# Patient Record
Sex: FEMALE | Race: WHITE | NOT HISPANIC OR LATINO | Employment: UNEMPLOYED | URBAN - METROPOLITAN AREA
[De-identification: names, ages, dates, MRNs, and addresses within clinical notes are randomized per-mention and may not be internally consistent; named-entity substitution may affect disease eponyms.]

---

## 2017-01-03 ENCOUNTER — HOSPITAL ENCOUNTER (OUTPATIENT)
Dept: MAMMOGRAPHY | Facility: HOSPITAL | Age: 52
Discharge: HOME | End: 2017-01-03
Attending: FAMILY MEDICINE | Admitting: FAMILY MEDICINE

## 2017-01-03 DIAGNOSIS — Z00.00 ANNUAL PHYSICAL EXAM: ICD-10-CM

## 2017-01-03 PROCEDURE — G0202 SCR MAMMO BI INCL CAD: HCPCS

## 2017-01-03 PROCEDURE — 77063 BREAST TOMOSYNTHESIS BI: CPT

## 2017-01-31 RX ORDER — GLYCOPYRROLATE 2 MG/1
1 TABLET ORAL 2 TIMES DAILY
Qty: 90 TABLET | Refills: 1 | Status: SHIPPED | OUTPATIENT
Start: 2017-01-31 | End: 2017-07-10 | Stop reason: SDUPTHER

## 2017-02-21 ENCOUNTER — TELEPHONE (OUTPATIENT)
Dept: INTERNAL MEDICINE | Facility: CLINIC | Age: 52
End: 2017-02-21

## 2017-02-21 NOTE — TELEPHONE ENCOUNTER
----- Message from Silvia Frias sent at 2017  2:15 PM EST -----  Regarding: PRIOR AUTHORIZATION  Contact: [phone]  :  63    ROHIT PATIENT    PATIENT SAID HER INSURANCE WAS REQUIRING PRIOR AUTHORIZATION FOR VESICARE, 5 MG, TAKEN ONCE DAILY.    PHARMACY IS [facility] [place]    HAS NONE LEFT      This has already been done

## 2017-02-23 RX ORDER — MELOXICAM 15 MG/1
TABLET ORAL
Qty: 30 TABLET | Refills: 6 | Status: SHIPPED | OUTPATIENT
Start: 2017-02-23 | End: 2017-06-22

## 2017-02-24 ENCOUNTER — TELEPHONE (OUTPATIENT)
Dept: INTERNAL MEDICINE | Facility: CLINIC | Age: 52
End: 2017-02-24

## 2017-02-24 NOTE — TELEPHONE ENCOUNTER
----- Message from Corrie Stacy sent at 2/24/2017  8:40 AM EST -----  Regarding: SAMPLES PLEASE  Gokul    solifenacin (VESICARE) 5 MG tablet       SAMPLES PLEASE - [phone]      i have tried to call pt 3 x she is not accepting call,  Pa was obatined for vesicare,   Called to [facility],  Cost of pt still   [phone]

## 2017-03-01 ENCOUNTER — TELEPHONE (OUTPATIENT)
Dept: INTERNAL MEDICINE | Facility: CLINIC | Age: 52
End: 2017-03-01

## 2017-03-01 RX ORDER — OXYBUTYNIN CHLORIDE 5 MG/1
5 TABLET, EXTENDED RELEASE ORAL DAILY
Qty: 30 TABLET | Refills: 5 | Status: SHIPPED | OUTPATIENT
Start: 2017-03-01 | End: 2017-08-24 | Stop reason: SDUPTHER

## 2017-03-01 NOTE — TELEPHONE ENCOUNTER
Patient advised.    ----- Message from Janae Diego MA sent at 3/1/2017 10:10 AM EST -----  Regarding: FW: [facility]  Contact: [phone]      ----- Message -----     From: Spenser Hodgson MD     Sent: 3/1/2017   8:10 AM       [facility] [place]  Subject: FW: [facility]                           I escribed oxybutinin for her.  Thanks.  ----- Message -----     From: Janae Diego MA     Sent: 2017   9:03 AM       To: Spenser Hodgson MD  Subject: FW: [facility]                               ----- Message -----     From: Silvia Frias     Sent: 2017   8:40 AM       To: [facility]  Subject: [facility]                               :  63    ROHIT PATIENT    PATIENT SAID WITH HER NEW INSURANCE THAT THE VESICARE, 5 MG, TAKEN ONCE A DAY, IS OVER $200 A MONTH. IS THERE A SUBSTITUTE SHE COULD USE?

## 2017-03-06 RX ORDER — TRAZODONE HYDROCHLORIDE 100 MG/1
TABLET ORAL
Qty: 30 TABLET | Refills: 6 | Status: SHIPPED | OUTPATIENT
Start: 2017-03-06 | End: 2017-06-28

## 2017-04-07 ENCOUNTER — TELEPHONE (OUTPATIENT)
Dept: INTERNAL MEDICINE | Facility: CLINIC | Age: 52
End: 2017-04-07

## 2017-04-07 RX ORDER — ACYCLOVIR 400 MG/1
400 TABLET ORAL DAILY
Qty: 30 TABLET | Refills: 6 | Status: SHIPPED | OUTPATIENT
Start: 2017-04-07 | End: 2018-04-23 | Stop reason: SDUPTHER

## 2017-04-07 NOTE — TELEPHONE ENCOUNTER
----- Message from Corrie Stacy sent at 4/7/2017  2:32 PM EDT -----  Regarding: DR BEE PT  Gokul    acyclovir (ZOVIRAX) 400 MG tablet - take one tablet daily  #30  None left  [facility]    [phone]    Another [facility] supposedly faxed      SENT TO PHARMACY

## 2017-04-25 RX ORDER — LEVOTHYROXINE SODIUM 112 UG/1
TABLET ORAL
Qty: 30 TABLET | Refills: 5 | Status: SHIPPED | OUTPATIENT
Start: 2017-04-25 | End: 2017-06-28

## 2017-05-01 ENCOUNTER — TELEPHONE (OUTPATIENT)
Dept: INTERNAL MEDICINE | Facility: CLINIC | Age: 52
End: 2017-05-01

## 2017-06-05 DIAGNOSIS — K21.9 GASTROESOPHAGEAL REFLUX DISEASE WITHOUT ESOPHAGITIS: ICD-10-CM

## 2017-06-05 RX ORDER — PANTOPRAZOLE SODIUM 40 MG/1
TABLET, DELAYED RELEASE ORAL
Qty: 30 TABLET | Refills: 5 | Status: SHIPPED | OUTPATIENT
Start: 2017-06-05 | End: 2017-06-28

## 2017-06-06 ENCOUNTER — OFFICE VISIT (OUTPATIENT)
Dept: GASTROENTEROLOGY | Facility: CLINIC | Age: 52
End: 2017-06-06

## 2017-06-06 VITALS
HEIGHT: 60 IN | DIASTOLIC BLOOD PRESSURE: 76 MMHG | SYSTOLIC BLOOD PRESSURE: 110 MMHG | WEIGHT: 169.2 LBS | BODY MASS INDEX: 33.22 KG/M2 | OXYGEN SATURATION: 97 % | HEART RATE: 60 BPM

## 2017-06-06 DIAGNOSIS — R13.10 DYSPHAGIA, UNSPECIFIED TYPE: ICD-10-CM

## 2017-06-06 DIAGNOSIS — R10.10 PAIN OF UPPER ABDOMEN: ICD-10-CM

## 2017-06-06 DIAGNOSIS — K21.9 GASTROESOPHAGEAL REFLUX DISEASE WITHOUT ESOPHAGITIS: ICD-10-CM

## 2017-06-06 DIAGNOSIS — R11.0 NAUSEA: Primary | ICD-10-CM

## 2017-06-06 PROCEDURE — 99203 OFFICE O/P NEW LOW 30 MIN: CPT | Performed by: INTERNAL MEDICINE

## 2017-06-06 RX ORDER — SUCRALFATE 1 G/1
1 TABLET ORAL 3 TIMES DAILY
Qty: 90 TABLET | Refills: 3 | Status: SHIPPED | OUTPATIENT
Start: 2017-06-06 | End: 2017-06-22

## 2017-06-06 NOTE — PROGRESS NOTES
PATIENT INFORMATION  Aundrea Mcgarry       - 1965    CHIEF COMPLAINT  Chief Complaint   Patient presents with   • Abdominal Pain   • Diarrhea   • Nausea   • Heartburn   • Difficulty Swallowing       HISTORY OF PRESENT ILLNESS  Abdominal Pain   Associated symptoms include diarrhea, frequency and nausea. Her past medical history is significant for GERD.   Diarrhea    Associated symptoms include abdominal pain, chills and coughing.   Nausea   Associated symptoms include abdominal pain, chills, coughing and nausea.   Heartburn   She complains of abdominal pain, coughing and nausea.     She presents with 6 months of postprandial epigastric abdominal pain, sharp, radiates to the back wit some associated nausea but not vomiting. She at times will wake up with pain. Beer does make it worse. Pain lasts up to 2 hours and she takes zantac and ibuprofen and pain slowly subsides. Weight is stable.   CT done last year with mildly dilated GB.She has underlying history for esophageal spasms and takes trazodone as needed but states this is not similar to those episodes.  She is also on Protonix daily for reflux.  She takes mobic daily for the past 1 year.  He denies any melena or hematochezia.  She did have a previous upper endoscopy several years ago.  The results of this could not be reviewed as the report is not available at this time.  Her overall weight has been stable.  She will at times note that while swallowing either liquids or solids she notes a slowness of passage into the stomach.  She denies any episodes of food getting stuck.    She is on mobic daily for the past one year. No melena or hematochezia.  REVIEW OF SYSTEMS  Review of Systems   Constitutional: Positive for chills.   HENT: Positive for trouble swallowing.    Respiratory: Positive for cough.    Gastrointestinal: Positive for abdominal pain, diarrhea and nausea.        REFLUX   Genitourinary: Positive for frequency.   Musculoskeletal: Positive for  back pain.   Allergic/Immunologic: Positive for environmental allergies.   All other systems reviewed and are negative.        ACTIVE PROBLEMS  Patient Active Problem List    Diagnosis   • Annual physical exam [Z00.00]   • Bursitis/tendonitis, shoulder [M71.9, M67.919]   • Right facial numbness [R20.0]   • Bilateral shoulder pain [M25.511, M25.512]   • Shoulder, capsulitis, adhesive [M75.00]   • Abdominal pain [R10.9]   • Arthritis [M19.90]   • Bunion [M21.619]   • Gastroesophageal reflux disease [K21.9]   • Irregular menstrual cycle [N92.6]   • Palpitations [R00.2]   • Dyskinesia of esophagus [K22.4]   • Ventricular premature beats [I49.3]   • Recurrent herpes labialis [B00.1]   • Disorder of thyroid [E07.9]   • Dizziness [R42]         PAST MEDICAL HISTORY  Past Medical History:   Diagnosis Date   • Broken jaw    • Ear infection    • GERD (gastroesophageal reflux disease)    • Hypothyroidism          SURGICAL HISTORY  Past Surgical History:   Procedure Laterality Date   • BREAST BIOPSY     • COLONOSCOPY     • HIP SURGERY     • HYSTERECTOMY     • MANDIBLE FRACTURE SURGERY     • TONSILLECTOMY           FAMILY HISTORY  Family History   Problem Relation Age of Onset   • Thyroid disease Mother    • Hypertension Mother    • Diabetes Father    • Hypertension Father    • Heart disease Father    • Breast cancer Maternal Aunt    • Breast cancer Paternal Aunt          SOCIAL HISTORY  Social History     Occupational History   • Not on file.     Social History Main Topics   • Smoking status: Former Smoker   • Smokeless tobacco: Not on file   • Alcohol use 3.0 oz/week     5 Glasses of wine per week   • Drug use: Defer   • Sexual activity: Defer         CURRENT MEDICATIONS    Current Outpatient Prescriptions:   •  acyclovir (ZOVIRAX) 400 MG tablet, Take 1 tablet by mouth Daily., Disp: 30 tablet, Rfl: 6  •  glycopyrrolate (ROBINUL) 2 MG tablet, Take 0.5 tablets by mouth 2 (Two) Times a Day., Disp: 90 tablet, Rfl: 1  •  ibuprofen  "(ADVIL,MOTRIN) 200 MG tablet, Take  by mouth., Disp: , Rfl:   •  levothyroxine (SYNTHROID, LEVOTHROID) 112 MCG tablet, , Disp: , Rfl:   •  levothyroxine (SYNTHROID, LEVOTHROID) 112 MCG tablet, TAKE ONE TABLET BY MOUTH DAILY, Disp: 30 tablet, Rfl: 5  •  meclizine (ANTIVERT) 25 MG tablet, , Disp: , Rfl:   •  meclizine (ANTIVERT) 50 MG tablet, Take 0.5 tablets by mouth 3 (three) times a day as needed for dizziness., Disp: 30 tablet, Rfl: 0  •  meloxicam (MOBIC) 15 MG tablet, TAKE ONE TABLET BY MOUTH DAILY, Disp: 30 tablet, Rfl: 6  •  metoprolol tartrate (LOPRESSOR) 25 MG tablet, TAKE ONE TABLET BY MOUTH TWICE A DAY, Disp: 60 tablet, Rfl: 6  •  MULTIPLE VITAMIN PO, Take  by mouth., Disp: , Rfl:   •  Nutritional Supplements (ESTROVEN PO), Take  by mouth daily., Disp: , Rfl:   •  oxybutynin XL (DITROPAN-XL) 5 MG 24 hr tablet, Take 1 tablet by mouth Daily., Disp: 30 tablet, Rfl: 5  •  pantoprazole (PROTONIX) 40 MG EC tablet, TAKE ONE TABLET BY MOUTH DAILY, Disp: 30 tablet, Rfl: 5  •  ranitidine (ZANTAC) 150 MG tablet, Take  by mouth., Disp: , Rfl:   •  sucralfate (CARAFATE) 1 G tablet, Take 1 tablet by mouth 3 (Three) Times a Day. Crush and dissolve in 10 cc of water, Disp: 90 tablet, Rfl: 3  •  traMADol (ULTRAM) 50 MG tablet, Take 1 tablet by mouth every 6 (six) hours as needed for moderate pain (4-6) (1-2 every 6 hours as needed for pain)., Disp: 20 tablet, Rfl: 0  •  traZODone (DESYREL) 100 MG tablet, TAKE ONE TABLET BY MOUTH EVERY NIGHT AT BEDTIME, Disp: 30 tablet, Rfl: 6    ALLERGIES  Adhesive tape and Erythromycin    VITALS  Vitals:    06/06/17 1326   BP: 110/76   Pulse: 60   SpO2: 97%   Weight: 169 lb 3.2 oz (76.7 kg)   Height: 60\" (152.4 cm)       LAST RESULTS   Office Visit on 12/05/2016   Component Date Value Ref Range Status   • WBC 12/05/2016 5.76  4.80 - 10.80 10*3/mm3 Final   • RBC 12/05/2016 4.19* 4.20 - 5.40 10*6/mm3 Final   • Hemoglobin 12/05/2016 13.1  12.0 - 16.0 g/dL Final   • Hematocrit 12/05/2016 38.7 "  37.0 - 47.0 % Final   • MCV 12/05/2016 92.4  81.0 - 99.0 fL Final   • MCH 12/05/2016 31.3* 27.0 - 31.0 pg Final   • MCHC 12/05/2016 33.9  31.0 - 37.0 g/dL Final   • RDW 12/05/2016 13.3  11.5 - 14.5 % Final   • Platelets 12/05/2016 291  140 - 500 10*3/mm3 Final   • Neutrophil Rel % 12/05/2016 65.1  45.0 - 70.0 % Final   • Lymphocyte Rel % 12/05/2016 21.0  20.0 - 45.0 % Final   • Monocyte Rel % 12/05/2016 7.6  3.0 - 8.0 % Final   • Eosinophil Rel % 12/05/2016 5.2* 0.0 - 4.0 % Final   • Basophil Rel % 12/05/2016 0.9  0.0 - 2.0 % Final   • Neutrophils Absolute 12/05/2016 3.75  1.50 - 8.30 10*3/mm3 Final   • Lymphocytes Absolute 12/05/2016 1.21  0.60 - 4.80 10*3/mm3 Final   • Monocytes Absolute 12/05/2016 0.44  0.00 - 1.00 10*3/mm3 Final   • Eosinophils Absolute 12/05/2016 0.30  0.10 - 0.30 10*3/mm3 Final   • Basophils Absolute 12/05/2016 0.05  0.00 - 0.20 10*3/mm3 Final   • Immature Granulocyte Rel % 12/05/2016 0.2  0.0 - 0.5 % Final   • Immature Grans Absolute 12/05/2016 0.01  0.00 - 0.03 10*3/mm3 Final   • nRBC 12/05/2016 0.0  0.0 - 0.0 /100 WBC Final   • Glucose 12/05/2016 92  65 - 99 mg/dL Final   • BUN 12/05/2016 14  6 - 20 mg/dL Final   • Creatinine 12/05/2016 0.69  0.57 - 1.00 mg/dL Final   • eGFR Non African Am 12/05/2016 89  >60 mL/min/1.73 Final   • eGFR African Am 12/05/2016 108  >60 mL/min/1.73 Final   • BUN/Creatinine Ratio 12/05/2016 20.3  7.0 - 25.0 Final   • Sodium 12/05/2016 141  136 - 145 mmol/L Final   • Potassium 12/05/2016 4.3  3.5 - 5.2 mmol/L Final   • Chloride 12/05/2016 102  98 - 107 mmol/L Final   • Total CO2 12/05/2016 27.2  22.0 - 29.0 mmol/L Final   • Calcium 12/05/2016 9.2  8.6 - 10.5 mg/dL Final   • Total Protein 12/05/2016 6.7  6.0 - 8.5 g/dL Final   • Albumin 12/05/2016 4.20  3.50 - 5.20 g/dL Final   • Globulin 12/05/2016 2.5  gm/dL Final   • A/G Ratio 12/05/2016 1.7  g/dL Final   • Total Bilirubin 12/05/2016 0.4  0.2 - 1.2 mg/dL Final   • Alkaline Phosphatase 12/05/2016 73  40 - 129  U/L Final   • AST (SGOT) 12/05/2016 18  5 - 32 U/L Final   • ALT (SGPT) 12/05/2016 19  5 - 33 U/L Final   • Total Cholesterol 12/05/2016 188  0 - 200 mg/dL Final   • Triglycerides 12/05/2016 99  0 - 150 mg/dL Final   • HDL Cholesterol 12/05/2016 57  40 - 60 mg/dL Final   • VLDL Cholesterol 12/05/2016 19.8  7 - 27 mg/dL Final   • LDL Cholesterol  12/05/2016 111* 0 - 100 mg/dL Final   • Chol/HDL Ratio 12/05/2016 3.30   Final   • Hemoglobin A1C 12/05/2016 4.70* 4.80 - 5.60 % Final   • TSH 12/05/2016 0.878  0.270 - 4.200 mIU/mL Final   • Free T4 12/05/2016 1.71* 0.93 - 1.70 ng/dL Final   • Vitamin B-12 12/05/2016 372  211 - 946 pg/mL Final   • 25 Hydroxy, Vitamin D 12/05/2016 27.0  ng/mL Final    Comment: Reference Range for Total Vitamin D 25(OH)  Deficiency    <20.0 ng/mL  Insufficiency 21-29 ng/mL  Sufficiency   30-74 ng/mL     • Color 12/05/2016 Yellow  Yellow, Straw, Dark Yellow, Rebecca Final   • Clarity, UA 12/05/2016 Clear  Clear Final   • Glucose, UA 12/05/2016 Negative  Negative mg/dL Final   • Bilirubin 12/05/2016 Negative  Negative Final   • Ketones, UA 12/05/2016 Negative  Negative Final   • Specific Gravity  12/05/2016 1.010  1.005 - 1.030 Final   • Blood, UA 12/05/2016 Negative  Negative Final   • pH, Urine 12/05/2016 7.0  5.0 - 8.0 Final   • Protein, POC 12/05/2016 Negative  Negative mg/dL Final   • Urobilinogen, UA 12/05/2016 Normal  Normal Final   • Leukocytes 12/05/2016 Negative  Negative Final   • Nitrite, UA 12/05/2016 Negative  Negative Final   • Urine Culture 12/05/2016 Final report   Final   • Result 1 12/05/2016 No growth   Final     No results found.    PHYSICAL EXAM  Physical Exam   Constitutional: She is oriented to person, place, and time. She appears well-developed and well-nourished. No distress.   HENT:   Head: Normocephalic and atraumatic.   Mouth/Throat: Oropharynx is clear and moist.   Eyes: EOM are normal. Pupils are equal, round, and reactive to light.   Neck: Normal range of motion.  No tracheal deviation present.   Cardiovascular: Normal rate, regular rhythm, normal heart sounds and intact distal pulses.  Exam reveals no gallop and no friction rub.    No murmur heard.  Pulmonary/Chest: Effort normal and breath sounds normal. No stridor. No respiratory distress. She has no wheezes. She has no rales. She exhibits no tenderness.   Abdominal: Soft. Bowel sounds are normal. She exhibits no distension. There is no tenderness. There is no rebound and no guarding.   Musculoskeletal: She exhibits no edema.   Lymphadenopathy:     She has no cervical adenopathy.   Neurological: She is alert and oriented to person, place, and time.   Skin: Skin is warm. She is not diaphoretic.   Psychiatric: She has a normal mood and affect. Her behavior is normal. Judgment and thought content normal.   Nursing note and vitals reviewed.      ASSESSMENT  Diagnoses and all orders for this visit:    Nausea  -     Hepatic Function Panel  -     Lipase  -     US Gallbladder; Future  -     NM HIDA Scan With Pharmacological Intervention; Future  -     Case Request; Standing  -     Case Request    Gastroesophageal reflux disease without esophagitis  -     Hepatic Function Panel  -     Lipase  -     US Gallbladder; Future  -     NM HIDA Scan With Pharmacological Intervention; Future  -     Case Request; Standing  -     Case Request    Dysphagia, unspecified type  -     Hepatic Function Panel  -     Lipase  -     US Gallbladder; Future  -     NM HIDA Scan With Pharmacological Intervention; Future  -     Case Request; Standing  -     Case Request    Pain of upper abdomen    Other orders  -     sucralfate (CARAFATE) 1 G tablet; Take 1 tablet by mouth 3 (Three) Times a Day. Crush and dissolve in 10 cc of water  -     Implement Anesthesia orders day of procedure.; Standing  -     Obtain informed consent; Standing  -     Verify informed consent; Standing          PLAN  No Follow-up on file.        Labs from December are reviewed and  hemoglobin and liver enzymes appear within normal limits.    If ultrasounds and HIDA scans are normal or proceed with upper endoscopy wrist benefits alternatives were all discussed with her she's willing to proceed.

## 2017-06-13 ENCOUNTER — HOSPITAL ENCOUNTER (OUTPATIENT)
Dept: NUCLEAR MEDICINE | Facility: HOSPITAL | Age: 52
Discharge: HOME | End: 2017-06-13
Attending: INTERNAL MEDICINE

## 2017-06-13 ENCOUNTER — HOSPITAL ENCOUNTER (OUTPATIENT)
Dept: ULTRASOUND IMAGING | Facility: HOSPITAL | Age: 52
Discharge: HOME | End: 2017-06-13
Attending: INTERNAL MEDICINE | Admitting: INTERNAL MEDICINE

## 2017-06-13 DIAGNOSIS — K21.9 GASTROESOPHAGEAL REFLUX DISEASE WITHOUT ESOPHAGITIS: ICD-10-CM

## 2017-06-13 DIAGNOSIS — R11.0 NAUSEA: ICD-10-CM

## 2017-06-13 DIAGNOSIS — R13.10 DYSPHAGIA, UNSPECIFIED TYPE: ICD-10-CM

## 2017-06-13 PROCEDURE — A9537 TC99M MEBROFENIN: HCPCS | Performed by: INTERNAL MEDICINE

## 2017-06-13 PROCEDURE — 25010000002 SINCALIDE PER 5 MCG: Performed by: INTERNAL MEDICINE

## 2017-06-13 PROCEDURE — 0 TECHNETIUM TC 99M MEBROFENIN KIT: Performed by: INTERNAL MEDICINE

## 2017-06-13 PROCEDURE — 78227 HEPATOBIL SYST IMAGE W/DRUG: CPT

## 2017-06-13 PROCEDURE — 76705 ECHO EXAM OF ABDOMEN: CPT

## 2017-06-13 RX ORDER — KIT FOR THE PREPARATION OF TECHNETIUM TC 99M MEBROFENIN 45 MG/10ML
1 INJECTION, POWDER, LYOPHILIZED, FOR SOLUTION INTRAVENOUS
Status: COMPLETED | OUTPATIENT
Start: 2017-06-13 | End: 2017-06-13

## 2017-06-13 RX ADMIN — MEBROFENIN 1 DOSE: 45 INJECTION, POWDER, LYOPHILIZED, FOR SOLUTION INTRAVENOUS at 13:15

## 2017-06-13 RX ADMIN — SINCALIDE 1.5 MCG: 5 INJECTION, POWDER, LYOPHILIZED, FOR SOLUTION INTRAVENOUS at 13:15

## 2017-06-22 ENCOUNTER — OFFICE VISIT (OUTPATIENT)
Dept: SURGERY | Facility: CLINIC | Age: 52
End: 2017-06-22

## 2017-06-22 VITALS — HEART RATE: 69 BPM | WEIGHT: 168 LBS | HEIGHT: 60 IN | OXYGEN SATURATION: 99 % | BODY MASS INDEX: 32.98 KG/M2

## 2017-06-22 DIAGNOSIS — K80.50 BILIARY COLIC: Primary | ICD-10-CM

## 2017-06-22 PROCEDURE — 99243 OFF/OP CNSLTJ NEW/EST LOW 30: CPT | Performed by: SURGERY

## 2017-06-25 NOTE — PROGRESS NOTES
CLINICAL SUMMARY (A/P):    51-year-old lady with typical symptoms of biliary colic and extensive family history of gallbladder disease with normal EF on HIDA scan but with exact reproduction of symptoms.  Based on this presentation she would like to proceed with laparoscopic cholecystectomy.  She understands nature the procedure and the risks including but not limited to bleeding, infection, conversion to open procedure, postoperative bile leak and the bowel function changes which can accompany cholecystectomy.  She also understands that no guarantee can be made that this will help with her symptoms.      CC:  Abdominal pain    HPI:  51-year-old lady referred for consultation by Dr. Hodgson for evaluation of abdominal pain.  Reports intermittent severe midepigastric abdominal pain radiating to the back and worse with beer intake specifically.  Duration of symptoms is for years but they have been worsening in the last 6 months.    ROS:  No chest pain or shortness of air.  Positives include occasional irregular heartbeat, joint pain, back pain, joint swelling.  All other systems reviewed and negative other than presenting complaints.    PSH:    Colonoscopy 2013  Hysterectomy  Left inguinal hernia repair  Left hip surgery  Right breast biopsy    PMH:    Gastroesophageal reflux disease  Rheumatoid arthritis  Hypothyroidism  History of PVCs    MEDICATIONS: reviewed, in Epic    ALLERGIES: reviewed, in [facility]    FAMILY HISTORY:    Gallbladder disease in father, several brothers and other relatives    SOCIAL HISTORY:   Denies tobacco use  Occasional alcohol use    PHYSICAL EXAM:   Constitutional: Well-developed well-nourished, no acute distress   Heart rate 69   Weight (pounds) 168   BMI 33   Height (inches) 60  Eyes: Conjunctiva normal, sclera nonicteric  ENMT: Hearing grossly normal, oral mucosa moist  Neck: Supple, no palpable mass, normal thyroid, trachea midline  Respiratory: Clear to auscultation, normal inspiratory  effort  Cardiovascular: Regular rate, no murmur, no carotid bruit, no peripheral edema, no jugular venous distention  Gastrointestinal: Soft, nontender, no palpable mass, no hepatosplenomegaly, negative for hernia, bowel sounds normal  Lymphatics (palpable nodes):  cervical-negative, axillary-negative  Skin:  Warm, dry, no rash on visualized skin surfaces  Musculoskeletal: Symmetric strength, normal gait  Psychiatric: Alert and oriented ×3, normal affect     RADIOLOGY/ENDOSCOPY:    HIDA scan 6/13/2017 45% EF  Gallbladder ultrasound 6/13/2017 negative gallbladder, mild hepatic steatosis  CT abdomen pelvis 4/24/2016 no acute process, gallbladder mildly distended, I reviewed the images and concur    David Kirkland M.D.

## 2017-06-28 ENCOUNTER — APPOINTMENT (OUTPATIENT)
Dept: PREADMISSION TESTING | Facility: HOSPITAL | Age: 52
End: 2017-06-28

## 2017-06-28 VITALS
WEIGHT: 169 LBS | OXYGEN SATURATION: 98 % | DIASTOLIC BLOOD PRESSURE: 77 MMHG | TEMPERATURE: 96.9 F | BODY MASS INDEX: 31.91 KG/M2 | SYSTOLIC BLOOD PRESSURE: 113 MMHG | HEART RATE: 52 BPM | HEIGHT: 61 IN | RESPIRATION RATE: 16 BRPM

## 2017-06-28 LAB
ANION GAP SERPL CALCULATED.3IONS-SCNC: 14.5 MMOL/L
BUN BLD-MCNC: 12 MG/DL (ref 6–20)
BUN/CREAT SERPL: 18.2 (ref 7–25)
CALCIUM SPEC-SCNC: 9.7 MG/DL (ref 8.6–10.5)
CHLORIDE SERPL-SCNC: 101 MMOL/L (ref 98–107)
CO2 SERPL-SCNC: 25.5 MMOL/L (ref 22–29)
CREAT BLD-MCNC: 0.66 MG/DL (ref 0.57–1)
DEPRECATED RDW RBC AUTO: 43.8 FL (ref 37–54)
ERYTHROCYTE [DISTWIDTH] IN BLOOD BY AUTOMATED COUNT: 13.3 % (ref 11.7–13)
GFR SERPL CREATININE-BSD FRML MDRD: 94 ML/MIN/1.73
GLUCOSE BLD-MCNC: 90 MG/DL (ref 65–99)
HCT VFR BLD AUTO: 39.7 % (ref 35.6–45.5)
HGB BLD-MCNC: 14.2 G/DL (ref 11.9–15.5)
MCH RBC QN AUTO: 32.4 PG (ref 26.9–32)
MCHC RBC AUTO-ENTMCNC: 35.8 G/DL (ref 32.4–36.3)
MCV RBC AUTO: 90.6 FL (ref 80.5–98.2)
PLATELET # BLD AUTO: 269 10*3/MM3 (ref 140–500)
PMV BLD AUTO: 11.9 FL (ref 6–12)
POTASSIUM BLD-SCNC: 4.4 MMOL/L (ref 3.5–5.2)
RBC # BLD AUTO: 4.38 10*6/MM3 (ref 3.9–5.2)
SODIUM BLD-SCNC: 141 MMOL/L (ref 136–145)
WBC NRBC COR # BLD: 5.81 10*3/MM3 (ref 4.5–10.7)

## 2017-06-28 PROCEDURE — 93010 ELECTROCARDIOGRAM REPORT: CPT | Performed by: INTERNAL MEDICINE

## 2017-06-28 RX ORDER — LEVOTHYROXINE SODIUM 112 UG/1
112 TABLET ORAL DAILY
COMMUNITY
End: 2018-02-01 | Stop reason: SDUPTHER

## 2017-06-30 ENCOUNTER — ANESTHESIA (OUTPATIENT)
Dept: PERIOP | Facility: HOSPITAL | Age: 52
End: 2017-06-30

## 2017-06-30 ENCOUNTER — ANESTHESIA EVENT (OUTPATIENT)
Dept: PERIOP | Facility: HOSPITAL | Age: 52
End: 2017-06-30

## 2017-06-30 ENCOUNTER — APPOINTMENT (OUTPATIENT)
Dept: GENERAL RADIOLOGY | Facility: HOSPITAL | Age: 52
End: 2017-06-30

## 2017-06-30 ENCOUNTER — HOSPITAL ENCOUNTER (OUTPATIENT)
Facility: HOSPITAL | Age: 52
Setting detail: HOSPITAL OUTPATIENT SURGERY
Discharge: HOME | End: 2017-06-30
Attending: SURGERY | Admitting: SURGERY

## 2017-06-30 VITALS
DIASTOLIC BLOOD PRESSURE: 77 MMHG | SYSTOLIC BLOOD PRESSURE: 117 MMHG | WEIGHT: 169 LBS | HEART RATE: 66 BPM | OXYGEN SATURATION: 97 % | BODY MASS INDEX: 31.93 KG/M2 | TEMPERATURE: 97.4 F | RESPIRATION RATE: 16 BRPM

## 2017-06-30 DIAGNOSIS — K80.50 BILIARY COLIC: ICD-10-CM

## 2017-06-30 PROCEDURE — 25010000002 MIDAZOLAM PER 1 MG: Performed by: ANESTHESIOLOGY

## 2017-06-30 PROCEDURE — 25010000002 KETOROLAC TROMETHAMINE PER 15 MG: Performed by: NURSE ANESTHETIST, CERTIFIED REGISTERED

## 2017-06-30 PROCEDURE — 47563 LAPARO CHOLECYSTECTOMY/GRAPH: CPT | Performed by: SURGERY

## 2017-06-30 PROCEDURE — 25010000002 PROPOFOL 10 MG/ML EMULSION: Performed by: NURSE ANESTHETIST, CERTIFIED REGISTERED

## 2017-06-30 PROCEDURE — 74300 X-RAY BILE DUCTS/PANCREAS: CPT

## 2017-06-30 PROCEDURE — 47563 LAPARO CHOLECYSTECTOMY/GRAPH: CPT | Performed by: PHYSICIAN ASSISTANT

## 2017-06-30 PROCEDURE — 88304 TISSUE EXAM BY PATHOLOGIST: CPT | Performed by: SURGERY

## 2017-06-30 PROCEDURE — 25010000002 FENTANYL CITRATE (PF) 100 MCG/2ML SOLUTION: Performed by: NURSE ANESTHETIST, CERTIFIED REGISTERED

## 2017-06-30 PROCEDURE — 25010000002 DEXAMETHASONE PER 1 MG: Performed by: NURSE ANESTHETIST, CERTIFIED REGISTERED

## 2017-06-30 PROCEDURE — 25010000002 ONDANSETRON PER 1 MG: Performed by: NURSE ANESTHETIST, CERTIFIED REGISTERED

## 2017-06-30 PROCEDURE — 0 IOTHALAMATE 60 % SOLUTION: Performed by: SURGERY

## 2017-06-30 PROCEDURE — 25010000002 FENTANYL CITRATE (PF) 100 MCG/2ML SOLUTION: Performed by: ANESTHESIOLOGY

## 2017-06-30 RX ORDER — MIDAZOLAM HYDROCHLORIDE 1 MG/ML
2 INJECTION INTRAMUSCULAR; INTRAVENOUS
Status: DISCONTINUED | OUTPATIENT
Start: 2017-06-30 | End: 2017-06-30 | Stop reason: HOSPADM

## 2017-06-30 RX ORDER — HYDROCODONE BITARTRATE AND ACETAMINOPHEN 7.5; 325 MG/1; MG/1
1 TABLET ORAL ONCE AS NEEDED
Status: COMPLETED | OUTPATIENT
Start: 2017-06-30 | End: 2017-06-30

## 2017-06-30 RX ORDER — OXYCODONE AND ACETAMINOPHEN 7.5; 325 MG/1; MG/1
1 TABLET ORAL ONCE AS NEEDED
Status: DISCONTINUED | OUTPATIENT
Start: 2017-06-30 | End: 2017-06-30 | Stop reason: HOSPADM

## 2017-06-30 RX ORDER — PROPOFOL 10 MG/ML
VIAL (ML) INTRAVENOUS AS NEEDED
Status: DISCONTINUED | OUTPATIENT
Start: 2017-06-30 | End: 2017-06-30 | Stop reason: SURG

## 2017-06-30 RX ORDER — ONDANSETRON 2 MG/ML
4 INJECTION INTRAMUSCULAR; INTRAVENOUS ONCE AS NEEDED
Status: DISCONTINUED | OUTPATIENT
Start: 2017-06-30 | End: 2017-06-30 | Stop reason: HOSPADM

## 2017-06-30 RX ORDER — PROMETHAZINE HYDROCHLORIDE 25 MG/1
25 TABLET ORAL ONCE AS NEEDED
Status: DISCONTINUED | OUTPATIENT
Start: 2017-06-30 | End: 2017-06-30 | Stop reason: HOSPADM

## 2017-06-30 RX ORDER — PROMETHAZINE HYDROCHLORIDE 25 MG/ML
12.5 INJECTION, SOLUTION INTRAMUSCULAR; INTRAVENOUS ONCE AS NEEDED
Status: DISCONTINUED | OUTPATIENT
Start: 2017-06-30 | End: 2017-06-30 | Stop reason: HOSPADM

## 2017-06-30 RX ORDER — FENTANYL CITRATE 50 UG/ML
50 INJECTION, SOLUTION INTRAMUSCULAR; INTRAVENOUS
Status: DISCONTINUED | OUTPATIENT
Start: 2017-06-30 | End: 2017-06-30 | Stop reason: HOSPADM

## 2017-06-30 RX ORDER — DEXAMETHASONE SODIUM PHOSPHATE 10 MG/ML
INJECTION INTRAMUSCULAR; INTRAVENOUS AS NEEDED
Status: DISCONTINUED | OUTPATIENT
Start: 2017-06-30 | End: 2017-06-30 | Stop reason: SURG

## 2017-06-30 RX ORDER — BUPIVACAINE HYDROCHLORIDE AND EPINEPHRINE 5; 5 MG/ML; UG/ML
INJECTION, SOLUTION PERINEURAL AS NEEDED
Status: DISCONTINUED | OUTPATIENT
Start: 2017-06-30 | End: 2017-06-30 | Stop reason: HOSPADM

## 2017-06-30 RX ORDER — FLUMAZENIL 0.1 MG/ML
0.2 INJECTION INTRAVENOUS AS NEEDED
Status: DISCONTINUED | OUTPATIENT
Start: 2017-06-30 | End: 2017-06-30 | Stop reason: HOSPADM

## 2017-06-30 RX ORDER — KETOROLAC TROMETHAMINE 30 MG/ML
INJECTION, SOLUTION INTRAMUSCULAR; INTRAVENOUS AS NEEDED
Status: DISCONTINUED | OUTPATIENT
Start: 2017-06-30 | End: 2017-06-30 | Stop reason: SURG

## 2017-06-30 RX ORDER — FENTANYL CITRATE 50 UG/ML
INJECTION, SOLUTION INTRAMUSCULAR; INTRAVENOUS AS NEEDED
Status: DISCONTINUED | OUTPATIENT
Start: 2017-06-30 | End: 2017-06-30 | Stop reason: SURG

## 2017-06-30 RX ORDER — LIDOCAINE HYDROCHLORIDE 20 MG/ML
INJECTION, SOLUTION INFILTRATION; PERINEURAL AS NEEDED
Status: DISCONTINUED | OUTPATIENT
Start: 2017-06-30 | End: 2017-06-30 | Stop reason: SURG

## 2017-06-30 RX ORDER — MIDAZOLAM HYDROCHLORIDE 1 MG/ML
1 INJECTION INTRAMUSCULAR; INTRAVENOUS
Status: DISCONTINUED | OUTPATIENT
Start: 2017-06-30 | End: 2017-06-30 | Stop reason: HOSPADM

## 2017-06-30 RX ORDER — HYDROCODONE BITARTRATE AND ACETAMINOPHEN 5; 325 MG/1; MG/1
TABLET ORAL
Qty: 40 TABLET | Refills: 0 | Status: SHIPPED | OUTPATIENT
Start: 2017-06-30 | End: 2017-07-10

## 2017-06-30 RX ORDER — SODIUM CHLORIDE 0.9 % (FLUSH) 0.9 %
1-10 SYRINGE (ML) INJECTION AS NEEDED
Status: DISCONTINUED | OUTPATIENT
Start: 2017-06-30 | End: 2017-06-30 | Stop reason: HOSPADM

## 2017-06-30 RX ORDER — ROCURONIUM BROMIDE 10 MG/ML
INJECTION, SOLUTION INTRAVENOUS AS NEEDED
Status: DISCONTINUED | OUTPATIENT
Start: 2017-06-30 | End: 2017-06-30 | Stop reason: SURG

## 2017-06-30 RX ORDER — DIPHENHYDRAMINE HYDROCHLORIDE 50 MG/ML
12.5 INJECTION INTRAMUSCULAR; INTRAVENOUS
Status: DISCONTINUED | OUTPATIENT
Start: 2017-06-30 | End: 2017-06-30 | Stop reason: HOSPADM

## 2017-06-30 RX ORDER — SODIUM CHLORIDE 9 MG/ML
INJECTION, SOLUTION INTRAVENOUS AS NEEDED
Status: DISCONTINUED | OUTPATIENT
Start: 2017-06-30 | End: 2017-06-30 | Stop reason: HOSPADM

## 2017-06-30 RX ORDER — PROMETHAZINE HYDROCHLORIDE 25 MG/1
25 TABLET ORAL EVERY 6 HOURS PRN
Qty: 10 TABLET | Refills: 0 | Status: SHIPPED | OUTPATIENT
Start: 2017-06-30 | End: 2017-07-10

## 2017-06-30 RX ORDER — EPHEDRINE SULFATE 50 MG/ML
5 INJECTION, SOLUTION INTRAVENOUS ONCE AS NEEDED
Status: DISCONTINUED | OUTPATIENT
Start: 2017-06-30 | End: 2017-06-30 | Stop reason: HOSPADM

## 2017-06-30 RX ORDER — PROMETHAZINE HYDROCHLORIDE 25 MG/1
25 SUPPOSITORY RECTAL ONCE AS NEEDED
Status: DISCONTINUED | OUTPATIENT
Start: 2017-06-30 | End: 2017-06-30 | Stop reason: HOSPADM

## 2017-06-30 RX ORDER — ONDANSETRON 2 MG/ML
INJECTION INTRAMUSCULAR; INTRAVENOUS AS NEEDED
Status: DISCONTINUED | OUTPATIENT
Start: 2017-06-30 | End: 2017-06-30 | Stop reason: SURG

## 2017-06-30 RX ORDER — PROMETHAZINE HYDROCHLORIDE 25 MG/1
12.5 TABLET ORAL ONCE AS NEEDED
Status: DISCONTINUED | OUTPATIENT
Start: 2017-06-30 | End: 2017-06-30 | Stop reason: HOSPADM

## 2017-06-30 RX ORDER — SODIUM CHLORIDE, SODIUM LACTATE, POTASSIUM CHLORIDE, CALCIUM CHLORIDE 600; 310; 30; 20 MG/100ML; MG/100ML; MG/100ML; MG/100ML
9 INJECTION, SOLUTION INTRAVENOUS CONTINUOUS
Status: DISCONTINUED | OUTPATIENT
Start: 2017-06-30 | End: 2017-06-30 | Stop reason: HOSPADM

## 2017-06-30 RX ORDER — HYDRALAZINE HYDROCHLORIDE 20 MG/ML
5 INJECTION INTRAMUSCULAR; INTRAVENOUS
Status: DISCONTINUED | OUTPATIENT
Start: 2017-06-30 | End: 2017-06-30 | Stop reason: HOSPADM

## 2017-06-30 RX ORDER — NALOXONE HCL 0.4 MG/ML
0.2 VIAL (ML) INJECTION AS NEEDED
Status: DISCONTINUED | OUTPATIENT
Start: 2017-06-30 | End: 2017-06-30 | Stop reason: HOSPADM

## 2017-06-30 RX ORDER — MAGNESIUM HYDROXIDE 1200 MG/15ML
LIQUID ORAL AS NEEDED
Status: DISCONTINUED | OUTPATIENT
Start: 2017-06-30 | End: 2017-06-30 | Stop reason: HOSPADM

## 2017-06-30 RX ORDER — LABETALOL HYDROCHLORIDE 5 MG/ML
5 INJECTION, SOLUTION INTRAVENOUS
Status: DISCONTINUED | OUTPATIENT
Start: 2017-06-30 | End: 2017-06-30 | Stop reason: HOSPADM

## 2017-06-30 RX ORDER — HYDROMORPHONE HYDROCHLORIDE 1 MG/ML
0.5 INJECTION, SOLUTION INTRAMUSCULAR; INTRAVENOUS; SUBCUTANEOUS
Status: DISCONTINUED | OUTPATIENT
Start: 2017-06-30 | End: 2017-06-30 | Stop reason: HOSPADM

## 2017-06-30 RX ORDER — FAMOTIDINE 10 MG/ML
20 INJECTION, SOLUTION INTRAVENOUS ONCE
Status: COMPLETED | OUTPATIENT
Start: 2017-06-30 | End: 2017-06-30

## 2017-06-30 RX ADMIN — HYDROCODONE BITARTRATE AND ACETAMINOPHEN 1 TABLET: 7.5; 325 TABLET ORAL at 14:32

## 2017-06-30 RX ADMIN — FENTANYL CITRATE 100 MCG: 50 INJECTION INTRAMUSCULAR; INTRAVENOUS at 13:39

## 2017-06-30 RX ADMIN — FENTANYL CITRATE 50 MCG: 50 INJECTION INTRAMUSCULAR; INTRAVENOUS at 14:35

## 2017-06-30 RX ADMIN — FENTANYL CITRATE 50 MCG: 50 INJECTION INTRAMUSCULAR; INTRAVENOUS at 14:05

## 2017-06-30 RX ADMIN — KETOROLAC TROMETHAMINE 30 MG: 30 INJECTION, SOLUTION INTRAMUSCULAR; INTRAVENOUS at 14:00

## 2017-06-30 RX ADMIN — DEXAMETHASONE SODIUM PHOSPHATE 8 MG: 10 INJECTION INTRAMUSCULAR; INTRAVENOUS at 13:50

## 2017-06-30 RX ADMIN — PROPOFOL 200 MG: 10 INJECTION, EMULSION INTRAVENOUS at 13:39

## 2017-06-30 RX ADMIN — LIDOCAINE HYDROCHLORIDE 80 MG: 20 INJECTION, SOLUTION INFILTRATION; PERINEURAL at 13:39

## 2017-06-30 RX ADMIN — FAMOTIDINE 20 MG: 10 INJECTION, SOLUTION INTRAVENOUS at 12:34

## 2017-06-30 RX ADMIN — FENTANYL CITRATE 50 MCG: 50 INJECTION INTRAMUSCULAR; INTRAVENOUS at 14:12

## 2017-06-30 RX ADMIN — SODIUM CHLORIDE, POTASSIUM CHLORIDE, SODIUM LACTATE AND CALCIUM CHLORIDE 9 ML/HR: 600; 310; 30; 20 INJECTION, SOLUTION INTRAVENOUS at 12:35

## 2017-06-30 RX ADMIN — FENTANYL CITRATE 50 MCG: 50 INJECTION INTRAMUSCULAR; INTRAVENOUS at 14:42

## 2017-06-30 RX ADMIN — MIDAZOLAM 2 MG: 1 INJECTION INTRAMUSCULAR; INTRAVENOUS at 12:34

## 2017-06-30 RX ADMIN — SUGAMMADEX 400 MG: 100 INJECTION, SOLUTION INTRAVENOUS at 14:05

## 2017-06-30 RX ADMIN — ROCURONIUM BROMIDE 30 MG: 10 INJECTION INTRAVENOUS at 13:39

## 2017-06-30 RX ADMIN — FENTANYL CITRATE 50 MCG: 50 INJECTION INTRAMUSCULAR; INTRAVENOUS at 14:10

## 2017-06-30 RX ADMIN — ONDANSETRON 4 MG: 2 INJECTION INTRAMUSCULAR; INTRAVENOUS at 13:51

## 2017-06-30 NOTE — ANESTHESIA PROCEDURE NOTES
Airway  Urgency: elective    Airway not difficult    General Information and Staff    Patient location during procedure: OR  Anesthesiologist: FAIZA CHEN  CRNA: VIVIENNE BARRERA    Indications and Patient Condition  Indications for airway management: airway protection    Preoxygenated: yes  MILS not maintained throughout  Mask difficulty assessment: 1 - vent by mask    Final Airway Details  Final airway type: endotracheal airway      Successful airway: ETT  Cuffed: yes   Successful intubation technique: direct laryngoscopy  Endotracheal tube insertion site: oral  Blade: Kelley  Blade size: #3  ETT size: 7.0 mm  Cormack-Lehane Classification: grade I - full view of glottis  Placement verified by: chest auscultation and capnometry   Measured from: lips  ETT to lips (cm): 21  Number of attempts at approach: 1    Additional Comments  Dentition intact and unchanged. CBEBS.  +ETCO2.

## 2017-06-30 NOTE — ANESTHESIA POSTPROCEDURE EVALUATION
Patient: Aundrea Mcgarry    Procedure Summary     Date Anesthesia Start Anesthesia Stop Room / Location    06/30/17 1333 1413  [facility]  [facility]  [facility]       Procedure Diagnosis Surgeon Provider    CHOLECYSTECTOMY LAPAROSCOPIC INTRAOPERATIVE CHOLANGIOGRAM (N/A Abdomen) Biliary colic  (Biliary colic [K80.50]) MD Adriane Chopra MD          Anesthesia Type: general  Last vitals  BP      Temp      Pulse     Resp      SpO2        Post Anesthesia Care and Evaluation      Comments: Patient discharged before being evaluated by an Anesthesiologist. No apparent complications per the record.  This case was not medically directed. I am completing this chart for medical records purposes; I personally have no medical involvement with this patient.

## 2017-06-30 NOTE — ANESTHESIA PREPROCEDURE EVALUATION
Anesthesia Evaluation     Patient summary reviewed and Nursing notes reviewed   history of anesthetic complications: PONV  NPO Solid Status: > 8 hours  NPO Liquid Status: > 2 hours     Airway   Mallampati: II  TM distance: >3 FB  Neck ROM: full  Dental - normal exam     Pulmonary - normal exam   (+) a smoker Former,   Cardiovascular - normal exam        Neuro/Psych  (+) dizziness/light headedness, numbness,    GI/Hepatic/Renal/Endo    (+)  hypothyroidism,     Musculoskeletal     Abdominal    Substance History      OB/GYN          Other   (+) arthritis                                     Anesthesia Plan    ASA 2     general     Anesthetic plan and risks discussed with patient.

## 2017-07-01 LAB
CYTO UR: NORMAL
LAB AP CASE REPORT: NORMAL
Lab: NORMAL
PATH REPORT.FINAL DX SPEC: NORMAL
PATH REPORT.GROSS SPEC: NORMAL

## 2017-07-10 ENCOUNTER — OFFICE VISIT (OUTPATIENT)
Dept: SURGERY | Facility: CLINIC | Age: 52
End: 2017-07-10

## 2017-07-10 DIAGNOSIS — Z09 FOLLOW UP: Primary | ICD-10-CM

## 2017-07-10 PROCEDURE — 99024 POSTOP FOLLOW-UP VISIT: CPT | Performed by: SURGERY

## 2017-07-10 RX ORDER — TRAZODONE HYDROCHLORIDE 100 MG/1
100 TABLET ORAL NIGHTLY
COMMUNITY
Start: 2017-07-05 | End: 2018-04-26

## 2017-07-10 RX ORDER — GLYCOPYRROLATE 2 MG/1
TABLET ORAL
Qty: 30 TABLET | Refills: 6 | Status: SHIPPED | OUTPATIENT
Start: 2017-07-10 | End: 2017-08-25 | Stop reason: SDUPTHER

## 2017-07-12 NOTE — PROGRESS NOTES
Postoperative visit    Laparoscopic cholecystectomy with cholangiogram 6/30/2017  Pathology: Benign  Cholangiogram: Normal    Office visit: Incisions are healing well, she is doing well, reporting no problems, follow-up as needed.

## 2017-08-24 RX ORDER — OXYBUTYNIN CHLORIDE 5 MG/1
5 TABLET, EXTENDED RELEASE ORAL DAILY
Qty: 30 TABLET | Refills: 5 | Status: SHIPPED | OUTPATIENT
Start: 2017-08-24 | End: 2018-02-20 | Stop reason: SDUPTHER

## 2017-08-24 RX ORDER — OXYBUTYNIN CHLORIDE 5 MG/1
5 TABLET, EXTENDED RELEASE ORAL DAILY
Qty: 30 TABLET | Refills: 5 | Status: SHIPPED | OUTPATIENT
Start: 2017-08-24 | End: 2017-08-24 | Stop reason: SDUPTHER

## 2017-08-25 RX ORDER — GLYCOPYRROLATE 2 MG/1
2 TABLET ORAL 3 TIMES DAILY
Qty: 60 TABLET | Refills: 6 | Status: SHIPPED | OUTPATIENT
Start: 2017-08-25 | End: 2018-04-26

## 2017-08-25 RX ORDER — GLYCOPYRROLATE 2 MG/1
2 TABLET ORAL 3 TIMES DAILY
Qty: 30 TABLET | Refills: 6 | Status: SHIPPED | OUTPATIENT
Start: 2017-08-25 | End: 2017-08-25

## 2017-08-25 NOTE — TELEPHONE ENCOUNTER
----- Message from Alla Adams sent at 8/24/2017 10:39 AM EDT -----  PLEASE GIVE PATIENT A CALL BACK ABOUT SOME QUESTIONS ABOUT HER MEDS.  SHE SAID SHE IS NEEDING SOME INCREASE AND THAT SHE HAS TALKED TO DR BEE ABOUT THIS IN THE PAST.  [phone]

## 2017-09-07 ENCOUNTER — OFFICE VISIT (OUTPATIENT)
Dept: INTERNAL MEDICINE | Facility: CLINIC | Age: 52
End: 2017-09-07

## 2017-09-07 VITALS
BODY MASS INDEX: 32.44 KG/M2 | TEMPERATURE: 97.8 F | OXYGEN SATURATION: 98 % | DIASTOLIC BLOOD PRESSURE: 70 MMHG | SYSTOLIC BLOOD PRESSURE: 120 MMHG | WEIGHT: 171.8 LBS | HEART RATE: 65 BPM | HEIGHT: 61 IN

## 2017-09-07 DIAGNOSIS — M19.90 ARTHRITIS: ICD-10-CM

## 2017-09-07 DIAGNOSIS — F41.8 DEPRESSION WITH ANXIETY: Primary | ICD-10-CM

## 2017-09-07 PROCEDURE — 99213 OFFICE O/P EST LOW 20 MIN: CPT | Performed by: FAMILY MEDICINE

## 2017-09-07 RX ORDER — FLUOXETINE 20 MG/1
20 TABLET, FILM COATED ORAL DAILY
Qty: 30 TABLET | Refills: 2 | Status: SHIPPED | OUTPATIENT
Start: 2017-09-07 | End: 2017-12-06

## 2017-09-07 RX ORDER — CELECOXIB 200 MG/1
200 CAPSULE ORAL DAILY
Qty: 30 CAPSULE | Refills: 5 | Status: SHIPPED | OUTPATIENT
Start: 2017-09-07 | End: 2017-12-07

## 2017-09-07 NOTE — PROGRESS NOTES
Subjective     Aundrea Mcgarry is a 51 y.o. female, who presents with a chief complaint of   Chief Complaint   Patient presents with   • Arthritis   • Anxiety       HPI     1. Pt presents with feelings of anxiety and overwhelm X 2 months.  She is having some trouble sleeping.  Appetite is okay.  Denies panic attacks and chest tightness.  She has felt more irritable and short-tempered.  Some weepiness.  She has a lot of life stress.  She has been on antidepressants in the past, years ago.  Denies SI.  She has been seeing a counselor for about a month.    2. Arthritis.  Feet.  She has seen Dr. Jarvis and meloxicam helped but caused stomach upset.  She would like to try another antiinflammatory.    The following portions of the patient's history were reviewed and updated as appropriate: allergies, current medications, past family history, past medical history, past social history, past surgical history and problem list.    Allergies: Erythromycin and Adhesive tape    Review of Systems   Constitutional: Negative.    HENT: Negative.    Eyes: Negative.    Respiratory: Negative.    Cardiovascular: Negative.    Gastrointestinal: Negative.    Endocrine: Negative.    Genitourinary: Negative.    Musculoskeletal: Positive for arthralgias.   Skin: Negative.    Allergic/Immunologic: Negative.    Neurological: Negative.    Hematological: Negative.    Psychiatric/Behavioral: The patient is nervous/anxious.        Objective     Wt Readings from Last 3 Encounters:   09/07/17 171 lb 12.8 oz (77.9 kg)   06/30/17 169 lb (76.7 kg)   06/28/17 169 lb (76.7 kg)     Temp Readings from Last 3 Encounters:   09/07/17 97.8 °F (36.6 °C)   06/30/17 97.4 °F (36.3 °C) (Temporal Artery )   06/28/17 96.9 °F (36.1 °C) (Oral)     BP Readings from Last 3 Encounters:   09/07/17 120/70   06/30/17 117/77   06/28/17 113/77     Pulse Readings from Last 3 Encounters:   09/07/17 65   06/30/17 66   06/28/17 52     Body mass index is 32.46 kg/(m^2).  SpO2  Readings from Last 3 Encounters:   09/07/17 98%   06/30/17 97%   06/28/17 98%       Physical Exam   Constitutional: She is oriented to person, place, and time. She appears well-developed and well-nourished.   HENT:   Head: Normocephalic.   Mouth/Throat: Oropharynx is clear and moist.   Eyes: Conjunctivae and EOM are normal. Pupils are equal, round, and reactive to light.   Neck: Normal range of motion. Neck supple. No thyromegaly present.   Cardiovascular: Normal rate, regular rhythm and normal heart sounds.    Pulmonary/Chest: Effort normal and breath sounds normal.   Abdominal: Soft. Bowel sounds are normal. There is no hepatosplenomegaly.   Musculoskeletal: Normal range of motion. She exhibits no edema.   Lymphadenopathy:     She has no cervical adenopathy.   Neurological: She is alert and oriented to person, place, and time.   Skin: Skin is warm and dry. No rash noted.   Psychiatric: She has a normal mood and affect. Her behavior is normal.   Vitals reviewed.        Assessment/Plan   Aundrea was seen today for arthritis and anxiety.    Diagnoses and all orders for this visit:    Depression with anxiety  -     FLUoxetine (PROzac) 20 MG tablet; Take 1 tablet by mouth Daily for 90 days.    Arthritis  -     celecoxib (CELEBREX) 200 MG capsule; Take 1 capsule by mouth Daily.      1. Anticipatory guidance given.  Start fluoxetine 20 mg daily.  Continue counseling.  Exercise.    2. Arthritis.  Start celecoxib with food.    Outpatient Medications Prior to Visit   Medication Sig Dispense Refill   • acyclovir (ZOVIRAX) 400 MG tablet Take 1 tablet by mouth Daily. 30 tablet 6   • glycopyrrolate (ROBINUL) 2 MG tablet Take 1 tablet by mouth 3 (Three) Times a Day. 60 tablet 6   • levothyroxine (SYNTHROID, LEVOTHROID) 112 MCG tablet Take 112 mcg by mouth Daily.     • metoprolol tartrate (LOPRESSOR) 25 MG tablet TAKE ONE TABLET BY MOUTH TWICE A DAY 60 tablet 5   • MULTIPLE VITAMIN PO Take 1 tablet/day by mouth. PT HOLDING FOR  SURGERY     • oxybutynin XL (DITROPAN-XL) 5 MG 24 hr tablet Take 1 tablet by mouth Daily. 30 tablet 5   • Pantoprazole Sodium (PROTONIX PO) Take 40 mg by mouth Every Morning.     • ranitidine (ZANTAC) 150 MG tablet Take 150 mg by mouth As Needed.     • traZODone (DESYREL) 100 MG tablet Take 100 mg by mouth Every Night.     • ibuprofen (ADVIL,MOTRIN) 200 MG tablet Take 200 mg by mouth Every 6 (Six) Hours As Needed. PT HOLDING FOR SURGERY     • metoprolol tartrate (LOPRESSOR) 25 MG tablet Take 25 mg by mouth 2 (Two) Times a Day.       No facility-administered medications prior to visit.      New Medications Ordered This Visit   Medications   • FLUoxetine (PROzac) 20 MG tablet     Sig: Take 1 tablet by mouth Daily for 90 days.     Dispense:  30 tablet     Refill:  2   • celecoxib (CELEBREX) 200 MG capsule     Sig: Take 1 capsule by mouth Daily.     Dispense:  30 capsule     Refill:  5     [unfilled]  Medications Discontinued During This Encounter   Medication Reason   • metoprolol tartrate (LOPRESSOR) 25 MG tablet Therapy completed   • ibuprofen (ADVIL,MOTRIN) 200 MG tablet          Return in about 4 weeks (around 10/5/2017).

## 2017-10-05 ENCOUNTER — OFFICE VISIT (OUTPATIENT)
Dept: INTERNAL MEDICINE | Facility: CLINIC | Age: 52
End: 2017-10-05

## 2017-10-05 VITALS
BODY MASS INDEX: 32.1 KG/M2 | HEART RATE: 52 BPM | OXYGEN SATURATION: 98 % | TEMPERATURE: 98 F | WEIGHT: 170 LBS | HEIGHT: 61 IN | DIASTOLIC BLOOD PRESSURE: 70 MMHG | SYSTOLIC BLOOD PRESSURE: 114 MMHG

## 2017-10-05 DIAGNOSIS — E03.9 HYPOTHYROIDISM, UNSPECIFIED TYPE: ICD-10-CM

## 2017-10-05 DIAGNOSIS — F41.8 DEPRESSION WITH ANXIETY: Primary | ICD-10-CM

## 2017-10-05 DIAGNOSIS — M19.90 ARTHRITIS: ICD-10-CM

## 2017-10-05 PROCEDURE — 99213 OFFICE O/P EST LOW 20 MIN: CPT | Performed by: FAMILY MEDICINE

## 2017-10-05 NOTE — PROGRESS NOTES
Subjective     Aundrea Mcgarry is a 51 y.o. female, who presents with a chief complaint of   Chief Complaint   Patient presents with   • Depression   • Arthritis       HPI     1. F/U depression and anxiety.  We started fluoxetine 20 mg one month ago.  She was feeling groggy so she started cutting it in half and is now taking 10 mg daily.  She reports that her anxiety symptoms have improved but not resolved.  Still life stress.  Still seeing a counselor.      2. Arthritis.  We started Celebrex last month as meloxicam caused stomach upset.  She reports that this helps with her arthritis but she is having tongue irritation.    The following portions of the patient's history were reviewed and updated as appropriate: allergies, current medications, past family history, past medical history, past social history, past surgical history and problem list.    Allergies: Erythromycin and Adhesive tape    Review of Systems   Constitutional: Negative.    HENT: Negative.    Eyes: Negative.    Respiratory: Negative.    Cardiovascular: Negative.    Gastrointestinal: Negative.    Endocrine: Negative.    Genitourinary: Negative.    Musculoskeletal: Positive for arthralgias.   Skin: Negative.    Allergic/Immunologic: Negative.    Neurological: Negative.    Hematological: Negative.      Objective     Wt Readings from Last 3 Encounters:   10/05/17 170 lb (77.1 kg)   09/07/17 171 lb 12.8 oz (77.9 kg)   06/30/17 169 lb (76.7 kg)     Temp Readings from Last 3 Encounters:   10/05/17 98 °F (36.7 °C)   09/07/17 97.8 °F (36.6 °C)   06/30/17 97.4 °F (36.3 °C) (Temporal Artery )     BP Readings from Last 3 Encounters:   10/05/17 114/70   09/07/17 120/70   06/30/17 117/77     Pulse Readings from Last 3 Encounters:   10/05/17 52   09/07/17 65   06/30/17 66     Body mass index is 32.12 kg/(m^2).  SpO2 Readings from Last 3 Encounters:   10/05/17 98%   09/07/17 98%   06/30/17 97%       Physical Exam   Constitutional: She is oriented to person, place,  and time. She appears well-developed and well-nourished.   HENT:   Head: Normocephalic.   Mouth/Throat: Oropharynx is clear and moist.   Eyes: Conjunctivae and EOM are normal. Pupils are equal, round, and reactive to light.   Neck: Normal range of motion. Neck supple. No thyromegaly present.   Cardiovascular: Normal rate, regular rhythm and normal heart sounds.    Pulmonary/Chest: Effort normal and breath sounds normal.   Abdominal: Soft. Bowel sounds are normal. There is no hepatosplenomegaly.   Musculoskeletal: Normal range of motion. She exhibits no edema.   Lymphadenopathy:     She has no cervical adenopathy.   Neurological: She is alert and oriented to person, place, and time.   Skin: Skin is warm and dry. No rash noted.   Psychiatric: She has a normal mood and affect. Her behavior is normal.   Vitals reviewed.    Assessment/Plan   Aundrea was seen today for depression and arthritis.    Diagnoses and all orders for this visit:    Depression with anxiety    Arthritis    Hypothyroidism, unspecified type  -     Comprehensive Metabolic Panel; Future  -     CBC & Differential; Future  -     TSH; Future  -     Lipid Panel With / Chol / HDL Ratio; Future  -     T4, Free; Future  -     Vitamin D 25 Hydroxy; Future  -     Vitamin B12; Future      1. Depression with anxiety.  Improved.  She will try increasing fluoxetine back to 20 mg daily.  Continue counseling.    2. Arthritis.  Change to Tylenol Arthritis.    3. Due for repeat labs/ physical exam in December.    Outpatient Medications Prior to Visit   Medication Sig Dispense Refill   • acyclovir (ZOVIRAX) 400 MG tablet Take 1 tablet by mouth Daily. 30 tablet 6   • celecoxib (CELEBREX) 200 MG capsule Take 1 capsule by mouth Daily. 30 capsule 5   • FLUoxetine (PROzac) 20 MG tablet Take 1 tablet by mouth Daily for 90 days. 30 tablet 2   • glycopyrrolate (ROBINUL) 2 MG tablet Take 1 tablet by mouth 3 (Three) Times a Day. 60 tablet 6   • levothyroxine (SYNTHROID,  LEVOTHROID) 112 MCG tablet Take 112 mcg by mouth Daily.     • metoprolol tartrate (LOPRESSOR) 25 MG tablet TAKE ONE TABLET BY MOUTH TWICE A DAY 60 tablet 5   • MULTIPLE VITAMIN PO Take 1 tablet/day by mouth. PT HOLDING FOR SURGERY     • oxybutynin XL (DITROPAN-XL) 5 MG 24 hr tablet Take 1 tablet by mouth Daily. 30 tablet 5   • Pantoprazole Sodium (PROTONIX PO) Take 40 mg by mouth Every Morning.     • ranitidine (ZANTAC) 150 MG tablet Take 150 mg by mouth As Needed.     • traZODone (DESYREL) 100 MG tablet Take 100 mg by mouth Every Night.       No facility-administered medications prior to visit.      No orders of the defined types were placed in this encounter.    [unfilled]  There are no discontinued medications.      Return in about 2 months (around 12/5/2017) for Annual physical.

## 2017-10-10 ENCOUNTER — RESULTS ENCOUNTER (OUTPATIENT)
Dept: INTERNAL MEDICINE | Facility: CLINIC | Age: 52
End: 2017-10-10

## 2017-10-10 DIAGNOSIS — E03.9 HYPOTHYROIDISM, UNSPECIFIED TYPE: ICD-10-CM

## 2017-10-12 ENCOUNTER — TELEPHONE (OUTPATIENT)
Dept: GASTROENTEROLOGY | Facility: CLINIC | Age: 52
End: 2017-10-12

## 2017-10-12 DIAGNOSIS — K21.9 GASTROESOPHAGEAL REFLUX DISEASE, ESOPHAGITIS PRESENCE NOT SPECIFIED: ICD-10-CM

## 2017-10-12 DIAGNOSIS — R13.10 DYSPHAGIA, UNSPECIFIED TYPE: Primary | ICD-10-CM

## 2017-10-12 NOTE — TELEPHONE ENCOUNTER
"----- Message from Shane Tse MA sent at 10/12/2017  1:27 PM EDT -----  Regarding: RE: Visit Follow-Up Question  Contact: [phone]  CAN YOU SCHEDULE EGD - THANKS    ----- Message -----     From: Aundrea Mcgarry     Sent: 10/12/2017   1:19 PM       To: Shane Tse MA  Subject: RE: Visit Follow-Up Question                     ok    ----- Message -----     From: Aundrea Mcgarry     Sent: 10/6/2017   7:51 AM       To: Meliza Pope MD  Subject: RE: Visit Follow-Up Question                     CAN WE SCHEDULE THIS EGD? THANKS    ----- Message -----     From: Aundrea Mcgarry     Sent: 10/5/2017   5:47 PM       To: Frankie Hay [facility]  Subject: Visit Follow-Up Question                         Can we go ahead and schedule the upper GI endoscopy that we discussed back at the end of June?     Dr. Pope had put in an order for this test at the same time she submitted the order for the HIDA scan of my gallbladder function. We did not do the endoscopy,, at that time because we decided to remove the gallbladder.     As I discussed with Dr. Pope at that time, I was having more severe and more frequent esophageal spasms. Those symptoms have continued - now they are even more frequent, more severe, and longer lasting.    Due to limited time off available from work, if we can discuss this over the phone, instead of having another office visit, that would be greatly appreciated.    Thank you,    Aundrea \"Nicole\" Malgorzata  c: [phone]  "

## 2017-10-13 PROBLEM — R13.10 DYSPHAGIA: Status: ACTIVE | Noted: 2017-10-13

## 2017-10-13 NOTE — TELEPHONE ENCOUNTER
Patient has been scheduled on 12/01/17 to arrive at 12:30pm. Instructions have been emailed to them.

## 2017-10-23 RX ORDER — LEVOTHYROXINE SODIUM 112 UG/1
TABLET ORAL
Qty: 30 TABLET | Refills: 4 | Status: ON HOLD | OUTPATIENT
Start: 2017-10-23 | End: 2017-12-01

## 2017-11-28 LAB
25(OH)D3+25(OH)D2 SERPL-MCNC: 43.4 NG/ML
ALBUMIN SERPL-MCNC: 4.1 G/DL (ref 3.5–5.2)
ALBUMIN/GLOB SERPL: 1.5 G/DL
ALP SERPL-CCNC: 75 U/L (ref 40–129)
ALT SERPL-CCNC: 18 U/L (ref 5–33)
AST SERPL-CCNC: 19 U/L (ref 5–32)
BASOPHILS # BLD AUTO: 0.06 10*3/MM3 (ref 0–0.2)
BASOPHILS NFR BLD AUTO: 1.2 % (ref 0–2)
BILIRUB SERPL-MCNC: 0.3 MG/DL (ref 0.2–1.2)
BUN SERPL-MCNC: 16 MG/DL (ref 6–20)
BUN/CREAT SERPL: 18.2 (ref 7–25)
CALCIUM SERPL-MCNC: 9.5 MG/DL (ref 8.6–10.5)
CHLORIDE SERPL-SCNC: 100 MMOL/L (ref 98–107)
CHOLEST SERPL-MCNC: 185 MG/DL (ref 0–200)
CHOLEST/HDLC SERPL: 3.3 {RATIO}
CO2 SERPL-SCNC: 28.2 MMOL/L (ref 22–29)
CREAT SERPL-MCNC: 0.88 MG/DL (ref 0.57–1)
EOSINOPHIL # BLD AUTO: 0.22 10*3/MM3 (ref 0.1–0.3)
EOSINOPHIL NFR BLD AUTO: 4.5 % (ref 0–4)
ERYTHROCYTE [DISTWIDTH] IN BLOOD BY AUTOMATED COUNT: 12.8 % (ref 11.5–14.5)
GFR SERPLBLD CREATININE-BSD FMLA CKD-EPI: 67 ML/MIN/1.73
GFR SERPLBLD CREATININE-BSD FMLA CKD-EPI: 81 ML/MIN/1.73
GLOBULIN SER CALC-MCNC: 2.7 GM/DL
GLUCOSE SERPL-MCNC: 88 MG/DL (ref 65–99)
HCT VFR BLD AUTO: 39.7 % (ref 37–47)
HDLC SERPL-MCNC: 56 MG/DL (ref 40–60)
HGB BLD-MCNC: 13.7 G/DL (ref 12–16)
IMM GRANULOCYTES # BLD: 0.01 10*3/MM3 (ref 0–0.03)
IMM GRANULOCYTES NFR BLD: 0.2 % (ref 0–0.5)
LDLC SERPL CALC-MCNC: 116 MG/DL (ref 0–100)
LYMPHOCYTES # BLD AUTO: 1.07 10*3/MM3 (ref 0.6–4.8)
LYMPHOCYTES NFR BLD AUTO: 21.8 % (ref 20–45)
MCH RBC QN AUTO: 32.8 PG (ref 27–31)
MCHC RBC AUTO-ENTMCNC: 34.5 G/DL (ref 31–37)
MCV RBC AUTO: 95 FL (ref 81–99)
MONOCYTES # BLD AUTO: 0.41 10*3/MM3 (ref 0–1)
MONOCYTES NFR BLD AUTO: 8.4 % (ref 3–8)
NEUTROPHILS # BLD AUTO: 3.14 10*3/MM3 (ref 1.5–8.3)
NEUTROPHILS NFR BLD AUTO: 63.9 % (ref 45–70)
NRBC BLD AUTO-RTO: 0 /100 WBC (ref 0–0)
PLATELET # BLD AUTO: 277 10*3/MM3 (ref 140–500)
POTASSIUM SERPL-SCNC: 4.7 MMOL/L (ref 3.5–5.2)
PROT SERPL-MCNC: 6.8 G/DL (ref 6–8.5)
RBC # BLD AUTO: 4.18 10*6/MM3 (ref 4.2–5.4)
SODIUM SERPL-SCNC: 138 MMOL/L (ref 136–145)
T4 FREE SERPL-MCNC: 1.65 NG/DL (ref 0.93–1.7)
TRIGL SERPL-MCNC: 66 MG/DL (ref 0–150)
TSH SERPL DL<=0.005 MIU/L-ACNC: 3.64 MIU/ML (ref 0.27–4.2)
VIT B12 SERPL-MCNC: 531 PG/ML (ref 232–1245)
VLDLC SERPL CALC-MCNC: 13.2 MG/DL (ref 7–27)
WBC # BLD AUTO: 4.91 10*3/MM3 (ref 4.8–10.8)

## 2017-11-30 ENCOUNTER — ANESTHESIA EVENT (OUTPATIENT)
Dept: PERIOP | Facility: HOSPITAL | Age: 52
End: 2017-11-30

## 2017-12-01 ENCOUNTER — HOSPITAL ENCOUNTER (OUTPATIENT)
Facility: HOSPITAL | Age: 52
Setting detail: HOSPITAL OUTPATIENT SURGERY
Discharge: HOME | End: 2017-12-01
Attending: INTERNAL MEDICINE | Admitting: INTERNAL MEDICINE

## 2017-12-01 ENCOUNTER — ANESTHESIA (OUTPATIENT)
Dept: PERIOP | Facility: HOSPITAL | Age: 52
End: 2017-12-01

## 2017-12-01 VITALS
HEART RATE: 55 BPM | RESPIRATION RATE: 15 BRPM | BODY MASS INDEX: 33.1 KG/M2 | WEIGHT: 168.6 LBS | HEIGHT: 60 IN | TEMPERATURE: 98 F | SYSTOLIC BLOOD PRESSURE: 122 MMHG | OXYGEN SATURATION: 96 % | DIASTOLIC BLOOD PRESSURE: 71 MMHG

## 2017-12-01 DIAGNOSIS — R13.10 DYSPHAGIA, UNSPECIFIED TYPE: ICD-10-CM

## 2017-12-01 DIAGNOSIS — K21.9 GASTROESOPHAGEAL REFLUX DISEASE, ESOPHAGITIS PRESENCE NOT SPECIFIED: ICD-10-CM

## 2017-12-01 PROCEDURE — 25010000002 PROPOFOL 10 MG/ML EMULSION: Performed by: NURSE ANESTHETIST, CERTIFIED REGISTERED

## 2017-12-01 PROCEDURE — C1726 CATH, BAL DIL, NON-VASCULAR: HCPCS | Performed by: INTERNAL MEDICINE

## 2017-12-01 PROCEDURE — 43249 ESOPH EGD DILATION <30 MM: CPT | Performed by: INTERNAL MEDICINE

## 2017-12-01 PROCEDURE — 43239 EGD BIOPSY SINGLE/MULTIPLE: CPT | Performed by: INTERNAL MEDICINE

## 2017-12-01 RX ORDER — GLYCOPYRROLATE 0.2 MG/ML
INJECTION INTRAMUSCULAR; INTRAVENOUS AS NEEDED
Status: DISCONTINUED | OUTPATIENT
Start: 2017-12-01 | End: 2017-12-01 | Stop reason: SURG

## 2017-12-01 RX ORDER — SODIUM CHLORIDE 0.9 % (FLUSH) 0.9 %
3 SYRINGE (ML) INJECTION AS NEEDED
Status: DISCONTINUED | OUTPATIENT
Start: 2017-12-01 | End: 2017-12-01 | Stop reason: HOSPADM

## 2017-12-01 RX ORDER — PROPOFOL 10 MG/ML
VIAL (ML) INTRAVENOUS CONTINUOUS PRN
Status: DISCONTINUED | OUTPATIENT
Start: 2017-12-01 | End: 2017-12-01 | Stop reason: SURG

## 2017-12-01 RX ORDER — MAGNESIUM HYDROXIDE 1200 MG/15ML
LIQUID ORAL AS NEEDED
Status: DISCONTINUED | OUTPATIENT
Start: 2017-12-01 | End: 2017-12-01 | Stop reason: HOSPADM

## 2017-12-01 RX ORDER — LIDOCAINE HYDROCHLORIDE 10 MG/ML
0.5 INJECTION, SOLUTION INFILTRATION; PERINEURAL ONCE AS NEEDED
Status: COMPLETED | OUTPATIENT
Start: 2017-12-01 | End: 2017-12-01

## 2017-12-01 RX ORDER — PROPOFOL 10 MG/ML
VIAL (ML) INTRAVENOUS AS NEEDED
Status: DISCONTINUED | OUTPATIENT
Start: 2017-12-01 | End: 2017-12-01 | Stop reason: SURG

## 2017-12-01 RX ORDER — SODIUM CHLORIDE, SODIUM LACTATE, POTASSIUM CHLORIDE, CALCIUM CHLORIDE 600; 310; 30; 20 MG/100ML; MG/100ML; MG/100ML; MG/100ML
1000 INJECTION, SOLUTION INTRAVENOUS CONTINUOUS PRN
Status: DISCONTINUED | OUTPATIENT
Start: 2017-12-01 | End: 2017-12-01 | Stop reason: HOSPADM

## 2017-12-01 RX ORDER — LIDOCAINE HYDROCHLORIDE 20 MG/ML
INJECTION, SOLUTION INFILTRATION; PERINEURAL AS NEEDED
Status: DISCONTINUED | OUTPATIENT
Start: 2017-12-01 | End: 2017-12-01 | Stop reason: SURG

## 2017-12-01 RX ADMIN — LIDOCAINE HYDROCHLORIDE 50 MG: 20 INJECTION, SOLUTION INFILTRATION; PERINEURAL at 13:31

## 2017-12-01 RX ADMIN — PROPOFOL 50 MG: 10 INJECTION, EMULSION INTRAVENOUS at 13:31

## 2017-12-01 RX ADMIN — PROPOFOL 50 MG: 10 INJECTION, EMULSION INTRAVENOUS at 13:40

## 2017-12-01 RX ADMIN — LIDOCAINE HYDROCHLORIDE 0.5 ML: 10 INJECTION, SOLUTION EPIDURAL; INFILTRATION; INTRACAUDAL; PERINEURAL at 13:08

## 2017-12-01 RX ADMIN — SODIUM CHLORIDE, POTASSIUM CHLORIDE, SODIUM LACTATE AND CALCIUM CHLORIDE: 600; 310; 30; 20 INJECTION, SOLUTION INTRAVENOUS at 13:31

## 2017-12-01 RX ADMIN — GLYCOPYRROLATE 0.1 MG: 0.2 INJECTION INTRAMUSCULAR; INTRAVENOUS at 13:30

## 2017-12-01 RX ADMIN — PROPOFOL 100 MCG/KG/MIN: 10 INJECTION, EMULSION INTRAVENOUS at 13:33

## 2017-12-01 RX ADMIN — SODIUM CHLORIDE, POTASSIUM CHLORIDE, SODIUM LACTATE AND CALCIUM CHLORIDE 1000 ML: 600; 310; 30; 20 INJECTION, SOLUTION INTRAVENOUS at 13:09

## 2017-12-01 RX ADMIN — PROPOFOL 50 MG: 10 INJECTION, EMULSION INTRAVENOUS at 13:36

## 2017-12-01 NOTE — PLAN OF CARE
Problem: GI Endoscopy (Adult)  Goal: Signs and Symptoms of Listed Potential Problems Will be Absent or Manageable (GI Endoscopy)  Outcome: Ongoing (interventions implemented as appropriate)    12/01/17 1302   GI Endoscopy   Problems Assessed (GI Endoscopy) all   Problems Present (GI Endoscopy) none

## 2017-12-01 NOTE — H&P
- 1965     CHIEF COMPLAINT      Chief Complaint   Patient presents with   • Abdominal Pain   • Diarrhea   • Nausea   • Heartburn   • Difficulty Swallowing         HISTORY OF PRESENT ILLNESS  Abdominal Pain   Associated symptoms include diarrhea, frequency and nausea. Her past medical history is significant for GERD.   Diarrhea    Associated symptoms include abdominal pain, chills and coughing.   Nausea   Associated symptoms include abdominal pain, chills, coughing and nausea.   Heartburn   She complains of abdominal pain, coughing and nausea.      She presents with 6 months of postprandial epigastric abdominal pain, sharp, radiates to the back wit some associated nausea but not vomiting. She at times will wake up with pain. Beer does make it worse. Pain lasts up to 2 hours and she takes zantac and ibuprofen and pain slowly subsides. Weight is stable.   CT done last year with mildly dilated GB.She has underlying history for esophageal spasms and takes trazodone as needed but states this is not similar to those episodes.  She is also on Protonix daily for reflux.  She takes mobic daily for the past 1 year.  He denies any melena or hematochezia.  She did have a previous upper endoscopy several years ago.  The results of this could not be reviewed as the report is not available at this time.  Her overall weight has been stable.  She will at times note that while swallowing either liquids or solids she notes a slowness of passage into the stomach.  She denies any episodes of food getting stuck.     She is on mobic daily for the past one year. No melena or hematochezia.  REVIEW OF SYSTEMS  Review of Systems   Constitutional: Positive for chills.   HENT: Positive for trouble swallowing.    Respiratory: Positive for cough.    Gastrointestinal: Positive for abdominal pain, diarrhea and nausea.        REFLUX   Genitourinary: Positive for frequency.   Musculoskeletal: Positive for back pain.   Allergic/Immunologic:  Positive for environmental allergies.   All other systems reviewed and are negative.           ACTIVE PROBLEMS      Patient Active Problem List     Diagnosis   • Annual physical exam [Z00.00]   • Bursitis/tendonitis, shoulder [M71.9, M67.919]   • Right facial numbness [R20.0]   • Bilateral shoulder pain [M25.511, M25.512]   • Shoulder, capsulitis, adhesive [M75.00]   • Abdominal pain [R10.9]   • Arthritis [M19.90]   • Bunion [M21.619]   • Gastroesophageal reflux disease [K21.9]   • Irregular menstrual cycle [N92.6]   • Palpitations [R00.2]   • Dyskinesia of esophagus [K22.4]   • Ventricular premature beats [I49.3]   • Recurrent herpes labialis [B00.1]   • Disorder of thyroid [E07.9]   • Dizziness [R42]            PAST MEDICAL HISTORY   Medical History         Past Medical History:   Diagnosis Date   • Broken jaw     • Ear infection     • GERD (gastroesophageal reflux disease)     • Hypothyroidism                 SURGICAL HISTORY   Surgical History          Past Surgical History:   Procedure Laterality Date   • BREAST BIOPSY       • COLONOSCOPY       • HIP SURGERY       • HYSTERECTOMY       • MANDIBLE FRACTURE SURGERY       • TONSILLECTOMY                   FAMILY HISTORY        Family History   Problem Relation Age of Onset   • Thyroid disease Mother     • Hypertension Mother     • Diabetes Father     • Hypertension Father     • Heart disease Father     • Breast cancer Maternal Aunt     • Breast cancer Paternal Aunt              SOCIAL HISTORY  Social History      Occupational History   • Not on file.             Social History Main Topics    • Smoking status: Former Smoker    • Smokeless tobacco: Not on file    • Alcohol use 3.0 oz/week       5 Glasses of wine per week   • Drug use: Defer    • Sexual activity: Defer             CURRENT MEDICATIONS     Current Outpatient Prescriptions:   •  acyclovir (ZOVIRAX) 400 MG tablet, Take 1 tablet by mouth Daily., Disp: 30 tablet, Rfl: 6  •  glycopyrrolate (ROBINUL) 2 MG  "tablet, Take 0.5 tablets by mouth 2 (Two) Times a Day., Disp: 90 tablet, Rfl: 1  •  ibuprofen (ADVIL,MOTRIN) 200 MG tablet, Take  by mouth., Disp: , Rfl:   •  levothyroxine (SYNTHROID, LEVOTHROID) 112 MCG tablet, , Disp: , Rfl:   •  levothyroxine (SYNTHROID, LEVOTHROID) 112 MCG tablet, TAKE ONE TABLET BY MOUTH DAILY, Disp: 30 tablet, Rfl: 5  •  meclizine (ANTIVERT) 25 MG tablet, , Disp: , Rfl:   •  meclizine (ANTIVERT) 50 MG tablet, Take 0.5 tablets by mouth 3 (three) times a day as needed for dizziness., Disp: 30 tablet, Rfl: 0  •  meloxicam (MOBIC) 15 MG tablet, TAKE ONE TABLET BY MOUTH DAILY, Disp: 30 tablet, Rfl: 6  •  metoprolol tartrate (LOPRESSOR) 25 MG tablet, TAKE ONE TABLET BY MOUTH TWICE A DAY, Disp: 60 tablet, Rfl: 6  •  MULTIPLE VITAMIN PO, Take  by mouth., Disp: , Rfl:   •  Nutritional Supplements (ESTROVEN PO), Take  by mouth daily., Disp: , Rfl:   •  oxybutynin XL (DITROPAN-XL) 5 MG 24 hr tablet, Take 1 tablet by mouth Daily., Disp: 30 tablet, Rfl: 5  •  pantoprazole (PROTONIX) 40 MG EC tablet, TAKE ONE TABLET BY MOUTH DAILY, Disp: 30 tablet, Rfl: 5  •  ranitidine (ZANTAC) 150 MG tablet, Take  by mouth., Disp: , Rfl:   •  sucralfate (CARAFATE) 1 G tablet, Take 1 tablet by mouth 3 (Three) Times a Day. Crush and dissolve in 10 cc of water, Disp: 90 tablet, Rfl: 3  •  traMADol (ULTRAM) 50 MG tablet, Take 1 tablet by mouth every 6 (six) hours as needed for moderate pain (4-6) (1-2 every 6 hours as needed for pain)., Disp: 20 tablet, Rfl: 0  •  traZODone (DESYREL) 100 MG tablet, TAKE ONE TABLET BY MOUTH EVERY NIGHT AT BEDTIME, Disp: 30 tablet, Rfl: 6     ALLERGIES  Adhesive tape and Erythromycin     VITALS   Vitals        Vitals:     06/06/17 1326   BP: 110/76   Pulse: 60   SpO2: 97%   Weight: 169 lb 3.2 oz (76.7 kg)   Height: 60\" (152.4 cm)            LAST RESULTS                             Office Visit on 12/05/2016   Component Date Value Ref Range Status   • WBC 12/05/2016 5.76  4.80 - 10.80 10*3/mm3 " Final   • RBC 12/05/2016 4.19* 4.20 - 5.40 10*6/mm3 Final   • Hemoglobin 12/05/2016 13.1  12.0 - 16.0 g/dL Final   • Hematocrit 12/05/2016 38.7  37.0 - 47.0 % Final   • MCV 12/05/2016 92.4  81.0 - 99.0 fL Final   • MCH 12/05/2016 31.3* 27.0 - 31.0 pg Final   • MCHC 12/05/2016 33.9  31.0 - 37.0 g/dL Final   • RDW 12/05/2016 13.3  11.5 - 14.5 % Final   • Platelets 12/05/2016 291  140 - 500 10*3/mm3 Final   • Neutrophil Rel % 12/05/2016 65.1  45.0 - 70.0 % Final   • Lymphocyte Rel % 12/05/2016 21.0  20.0 - 45.0 % Final   • Monocyte Rel % 12/05/2016 7.6  3.0 - 8.0 % Final   • Eosinophil Rel % 12/05/2016 5.2* 0.0 - 4.0 % Final   • Basophil Rel % 12/05/2016 0.9  0.0 - 2.0 % Final   • Neutrophils Absolute 12/05/2016 3.75  1.50 - 8.30 10*3/mm3 Final   • Lymphocytes Absolute 12/05/2016 1.21  0.60 - 4.80 10*3/mm3 Final   • Monocytes Absolute 12/05/2016 0.44  0.00 - 1.00 10*3/mm3 Final   • Eosinophils Absolute 12/05/2016 0.30  0.10 - 0.30 10*3/mm3 Final   • Basophils Absolute 12/05/2016 0.05  0.00 - 0.20 10*3/mm3 Final   • Immature Granulocyte Rel % 12/05/2016 0.2  0.0 - 0.5 % Final   • Immature Grans Absolute 12/05/2016 0.01  0.00 - 0.03 10*3/mm3 Final   • nRBC 12/05/2016 0.0  0.0 - 0.0 /100 WBC Final   • Glucose 12/05/2016 92  65 - 99 mg/dL Final   • BUN 12/05/2016 14  6 - 20 mg/dL Final   • Creatinine 12/05/2016 0.69  0.57 - 1.00 mg/dL Final   • eGFR Non African Am 12/05/2016 89  >60 mL/min/1.73 Final   • eGFR African Am 12/05/2016 108  >60 mL/min/1.73 Final   • BUN/Creatinine Ratio 12/05/2016 20.3  7.0 - 25.0 Final   • Sodium 12/05/2016 141  136 - 145 mmol/L Final   • Potassium 12/05/2016 4.3  3.5 - 5.2 mmol/L Final   • Chloride 12/05/2016 102  98 - 107 mmol/L Final   • Total CO2 12/05/2016 27.2  22.0 - 29.0 mmol/L Final   • Calcium 12/05/2016 9.2  8.6 - 10.5 mg/dL Final   • Total Protein 12/05/2016 6.7  6.0 - 8.5 g/dL Final   • Albumin 12/05/2016 4.20  3.50 - 5.20 g/dL Final   • Globulin 12/05/2016 2.5  gm/dL Final   •  A/G Ratio 12/05/2016 1.7  g/dL Final   • Total Bilirubin 12/05/2016 0.4  0.2 - 1.2 mg/dL Final   • Alkaline Phosphatase 12/05/2016 73  40 - 129 U/L Final   • AST (SGOT) 12/05/2016 18  5 - 32 U/L Final   • ALT (SGPT) 12/05/2016 19  5 - 33 U/L Final   • Total Cholesterol 12/05/2016 188  0 - 200 mg/dL Final   • Triglycerides 12/05/2016 99  0 - 150 mg/dL Final   • HDL Cholesterol 12/05/2016 57  40 - 60 mg/dL Final   • VLDL Cholesterol 12/05/2016 19.8  7 - 27 mg/dL Final   • LDL Cholesterol  12/05/2016 111* 0 - 100 mg/dL Final   • Chol/HDL Ratio 12/05/2016 3.30    Final   • Hemoglobin A1C 12/05/2016 4.70* 4.80 - 5.60 % Final   • TSH 12/05/2016 0.878  0.270 - 4.200 mIU/mL Final   • Free T4 12/05/2016 1.71* 0.93 - 1.70 ng/dL Final   • Vitamin B-12 12/05/2016 372  211 - 946 pg/mL Final   • 25 Hydroxy, Vitamin D 12/05/2016 27.0  ng/mL Final     Comment: Reference Range for Total Vitamin D 25(OH)  Deficiency    <20.0 ng/mL  Insufficiency 21-29 ng/mL  Sufficiency   30-74 ng/mL   • Color 12/05/2016 Yellow  Yellow, Straw, Dark Yellow, Rebecca Final   • Clarity, UA 12/05/2016 Clear  Clear Final   • Glucose, UA 12/05/2016 Negative  Negative mg/dL Final   • Bilirubin 12/05/2016 Negative  Negative Final   • Ketones, UA 12/05/2016 Negative  Negative Final   • Specific Gravity  12/05/2016 1.010  1.005 - 1.030 Final   • Blood, UA 12/05/2016 Negative  Negative Final   • pH, Urine 12/05/2016 7.0  5.0 - 8.0 Final   • Protein, POC 12/05/2016 Negative  Negative mg/dL Final   • Urobilinogen, UA 12/05/2016 Normal  Normal Final   • Leukocytes 12/05/2016 Negative  Negative Final   • Nitrite, UA 12/05/2016 Negative  Negative Final   • Urine Culture 12/05/2016 Final report    Final   • Result 1 12/05/2016 No growth    Final      No results found.     PHYSICAL EXAM  Physical Exam   Constitutional: She is oriented to person, place, and time. She appears well-developed and well-nourished. No distress.   HENT:   Head: Normocephalic and atraumatic.    Mouth/Throat: Oropharynx is clear and moist.   Eyes: EOM are normal. Pupils are equal, round, and reactive to light.   Neck: Normal range of motion. No tracheal deviation present.   Cardiovascular: Normal rate, regular rhythm, normal heart sounds and intact distal pulses.  Exam reveals no gallop and no friction rub.    No murmur heard.  Pulmonary/Chest: Effort normal and breath sounds normal. No stridor. No respiratory distress. She has no wheezes. She has no rales. She exhibits no tenderness.   Abdominal: Soft. Bowel sounds are normal. She exhibits no distension. There is no tenderness. There is no rebound and no guarding.   Musculoskeletal: She exhibits no edema.   Lymphadenopathy:     She has no cervical adenopathy.   Neurological: She is alert and oriented to person, place, and time.   Skin: Skin is warm. She is not diaphoretic.   Psychiatric: She has a normal mood and affect. Her behavior is normal. Judgment and thought content normal.   Nursing note and vitals reviewed.        ASSESSMENT  Diagnoses and all orders for this visit:     Nausea  -     Hepatic Function Panel  -     Lipase  -     US Gallbladder; Future  -     NM HIDA Scan With Pharmacological Intervention; Future  -     Case Request; Standing  -     Case Request     Gastroesophageal reflux disease without esophagitis  -     Hepatic Function Panel  -     Lipase  -     US Gallbladder; Future  -     NM HIDA Scan With Pharmacological Intervention; Future  -     Case Request; Standing  -     Case Request     Dysphagia, unspecified type  -     Hepatic Function Panel  -     Lipase  -     US Gallbladder; Future  -     NM HIDA Scan With Pharmacological Intervention; Future  -     Case Request; Standing  -     Case Request     Pain of upper abdomen     Other orders  -     sucralfate (CARAFATE) 1 G tablet; Take 1 tablet by mouth 3 (Three) Times a Day. Crush and dissolve in 10 cc of water  -     Implement Anesthesia orders day of procedure.; Standing  -      Obtain informed consent; Standing  -     Verify informed consent; Standing              PLAN  EGD           Labs from December are reviewed and hemoglobin and liver enzymes appear within normal limits.     If ultrasounds and HIDA scans are normal or proceed with upper endoscopy wrist benefits alternatives were all discussed with her she's willing to proceed.

## 2017-12-01 NOTE — ANESTHESIA POSTPROCEDURE EVALUATION
Patient: Aundrea Mcgarry    Procedure Summary     Date Anesthesia Start Anesthesia Stop Room / Location    12/01/17 1329 1350 [facility] / [facility]       Procedure Diagnosis Surgeon Provider    ESOPHAGOGASTRODUODENOSCOPY with esophageal dilitation to 20mm,, and tissue biopsies/ polypectomies esophagus and gastric (N/A Esophagus) Gastroesophageal reflux disease, esophagitis presence not specified; Dysphagia, unspecified type  (Gastroesophageal reflux disease, esophagitis presence not specified [K21.9]; Dysphagia, unspecified type [R13.10]) MD Octavio Nichols CRNA          Anesthesia Type: MAC  Last vitals  BP   111/60 (12/01/17 1227)   Temp   97.9 °F (36.6 °C) (12/01/17 1227)   Pulse   51 (12/01/17 1227)   Resp   16 (12/01/17 1227)     SpO2   95 % (12/01/17 1227)     Post Anesthesia Care and Evaluation    Patient location during evaluation: PHASE II  Patient participation: complete - patient participated  Level of consciousness: awake and alert  Pain score: 0  Pain management: adequate  Airway patency: patent  Anesthetic complications: No anesthetic complications  PONV Status: noneRespiratory status: acceptable  Hydration status: acceptable

## 2017-12-01 NOTE — OP NOTE
EGD Procedure Note        Indication:  Dysphagia    Consent: Procedure of EGD was explained to the patient in detail including but not limited to the complications of bleeding perforation and possible reactions to sedation.  She understood all this and was willing to proceed.    Anesthesia: Sedation was provided by anesthesia.    Procedure:  After excellent sedation a flexible endoscope was passed into the oropharynx into the distal esophagus.  There was a small sliding hiatal hernia noted.  No strictures were noted here.  Distal esophageal biopsies and mid esophageal biopsies are obtained.  The scope was easily traversed into the stomach although into the antrum.  Mild amount of gastritis is noted here this is biopsied.  Gastric polyps in the fundus and body are noted in these are biopsied also.  The scope was retroflexed in the stomach straightened and passed into the duodenal bulb although into the second portion with ease.  The scope was then slowly withdrawn back out into the distal esophagus.  A TTS balloon dilator was introduced and the area was dilated to 20 mm held for 60 seconds.  There is no significant trauma noted.  The balloon is deflated and withdrawn out of the scope.  The scope was then withdrawn out of the patient with no immediate complications and she tolerated procedure well.        Impression/Plan:  Hiatal hernia  Gastritis  Gastric polyps  Status post dilation with TTS balloon dilator.  We'll await esophageal biopsies area she continues to have ongoing issues we'll proceed with manometry.

## 2017-12-01 NOTE — ANESTHESIA PREPROCEDURE EVALUATION
Anesthesia Evaluation     Patient summary reviewed and Nursing notes reviewed   history of anesthetic complications: PONV  NPO Solid Status: > 8 hours  NPO Liquid Status: > 8 hours     Airway   Mallampati: II  TM distance: >3 FB  Neck ROM: full  no difficulty expected  Dental - normal exam     Comment: Partial upper plate      Pulmonary - normal exam    breath sounds clear to auscultation  (+) a smoker Former,   Cardiovascular - normal exam  Exercise tolerance: good (4-7 METS)    ECG reviewed  Patient on routine beta blocker and Beta blocker given within 24 hours of surgery  Rhythm: regular  Rate: normal    (+) dysrhythmias (takes metoprolol),     ROS comment: SINUS RHYTHM  FIRST DEGREE AV BLOCK  nonspecific ST-T wave changes in the anterior leads unchanged when compared to the previous electrocardiogram    Neuro/Psych  (+) dizziness/light headedness, psychiatric history Anxiety and Depression,    (-) numbness  GI/Hepatic/Renal/Endo    (+)  GERD poorly controlled, hypothyroidism,     ROS Comment: Dysphagia         Dyskinesia of esophagus      Musculoskeletal         ROS comment: Shoulder, capsulitis, adhesive      Bilateral shoulder pain      Abdominal  - normal exam   Substance History   (+) alcohol use (daily wine),      OB/GYN          Other   (+) arthritis                                     Anesthesia Plan    ASA 2     MAC     Anesthetic plan and risks discussed with patient.  Use of blood products discussed with patient  Consented to blood products.

## 2017-12-04 DIAGNOSIS — K21.9 GASTROESOPHAGEAL REFLUX DISEASE WITHOUT ESOPHAGITIS: ICD-10-CM

## 2017-12-04 RX ORDER — PANTOPRAZOLE SODIUM 40 MG/1
TABLET, DELAYED RELEASE ORAL
Qty: 30 TABLET | Refills: 4 | Status: SHIPPED | OUTPATIENT
Start: 2017-12-04 | End: 2017-12-08

## 2017-12-07 ENCOUNTER — OFFICE VISIT (OUTPATIENT)
Dept: INTERNAL MEDICINE | Facility: CLINIC | Age: 52
End: 2017-12-07

## 2017-12-07 VITALS
BODY MASS INDEX: 39.53 KG/M2 | DIASTOLIC BLOOD PRESSURE: 64 MMHG | HEIGHT: 55 IN | OXYGEN SATURATION: 98 % | TEMPERATURE: 97.8 F | WEIGHT: 170.8 LBS | HEART RATE: 57 BPM | SYSTOLIC BLOOD PRESSURE: 114 MMHG

## 2017-12-07 DIAGNOSIS — Z00.00 ANNUAL PHYSICAL EXAM: Primary | ICD-10-CM

## 2017-12-07 DIAGNOSIS — F41.8 DEPRESSION WITH ANXIETY: ICD-10-CM

## 2017-12-07 PROBLEM — M85.852 OSTEOPENIA OF BOTH THIGHS: Status: ACTIVE | Noted: 2017-12-07

## 2017-12-07 PROBLEM — M85.851 OSTEOPENIA OF BOTH THIGHS: Status: ACTIVE | Noted: 2017-12-07

## 2017-12-07 PROCEDURE — 99396 PREV VISIT EST AGE 40-64: CPT | Performed by: FAMILY MEDICINE

## 2017-12-07 NOTE — PROGRESS NOTES
Patient Name: Aundrea Guillaume is a 52 y.o. female presenting for Anxiety and Annual Exam    Well Adult Physical   Patient here for a comprehensive physical exam.The patient reports problems - Her depression is improved but still adequately controlled.    Do you take any herbs or supplements that were not prescribed by a doctor? no   Are you taking calcium supplements? no   Are you taking aspirin daily? no     History:  Any STD's in the past? none    Aundrea Mcgarry 52 y.o. female who presents for an Annual Wellness Visit.  she has a history of   Patient Active Problem List   Diagnosis   • Abdominal pain   • Arthritis   • Bunion   • Gastroesophageal reflux disease   • Irregular menstrual cycle   • Palpitations   • Dyskinesia of esophagus   • Ventricular premature beats   • Recurrent herpes labialis   • Disorder of thyroid   • Dizziness   • Shoulder, capsulitis, adhesive   • Bilateral shoulder pain   • Right facial numbness   • Bursitis/tendonitis, shoulder   • Annual physical exam   • Depression with anxiety   • Dysphagia   • Osteopenia of both thighs   .  she has been doing well with new interval problems.      Health Habits:  Dental Exam. up to date  Eye Exam. not up to date - Will schedule in January  Exercise: 0 times/week.  Current exercise activities include: none    Menstrual history:  Last pap date:   Abnormal pap?   Mammogram:  Dexa:  Colonoscopy:  Tob use:  Qualifies for lung Ca screening?    The following portions of the patient's history were reviewed and updated as appropriate: allergies, current medications, past family history, past medical history, past social history, past surgical history and problem list.    Review of Systems   Constitutional: Negative.    HENT: Negative.    Eyes: Negative.    Respiratory: Negative.    Cardiovascular: Negative.    Gastrointestinal: Negative.    Endocrine: Negative.    Genitourinary: Negative.    Musculoskeletal: Positive for arthralgias.  "  Skin: Negative.    Allergic/Immunologic: Negative.    Neurological: Negative.    Hematological: Negative.        Erythromycin and Adhesive tape      Current Outpatient Prescriptions:   •  acyclovir (ZOVIRAX) 400 MG tablet, Take 1 tablet by mouth Daily., Disp: 30 tablet, Rfl: 6  •  glycopyrrolate (ROBINUL) 2 MG tablet, Take 1 tablet by mouth 3 (Three) Times a Day., Disp: 60 tablet, Rfl: 6  •  Lactobacillus (PROBIOTIC ACIDOPHILUS PO), Take  by mouth., Disp: , Rfl:   •  levothyroxine (SYNTHROID, LEVOTHROID) 112 MCG tablet, Take 112 mcg by mouth Daily., Disp: , Rfl:   •  metoprolol tartrate (LOPRESSOR) 25 MG tablet, TAKE ONE TABLET BY MOUTH TWICE A DAY, Disp: 60 tablet, Rfl: 5  •  MULTIPLE VITAMIN PO, Take 1 tablet/day by mouth. PT HOLDING FOR SURGERY, Disp: , Rfl:   •  oxybutynin XL (DITROPAN-XL) 5 MG 24 hr tablet, Take 1 tablet by mouth Daily., Disp: 30 tablet, Rfl: 5  •  pantoprazole (PROTONIX) 40 MG EC tablet, TAKE ONE TABLET BY MOUTH DAILY, Disp: 30 tablet, Rfl: 4  •  ranitidine (ZANTAC) 150 MG tablet, Take 150 mg by mouth As Needed., Disp: , Rfl:   •  traZODone (DESYREL) 100 MG tablet, Take 100 mg by mouth Every Night., Disp: , Rfl:     OBJECTIVE    /64  Pulse 57  Temp 97.8 °F (36.6 °C)  Ht 60 cm (23.62\")  Wt 77.5 kg (170 lb 12.8 oz)  SpO2 98%  .21 kg/m2    Physical Exam   Constitutional: She is oriented to person, place, and time. She appears well-developed and well-nourished.   HENT:   Head: Normocephalic and atraumatic.   Right Ear: External ear normal.   Left Ear: External ear normal.   Nose: Nose normal.   Mouth/Throat: Oropharynx is clear and moist.   Eyes: Conjunctivae and EOM are normal. Pupils are equal, round, and reactive to light. No scleral icterus.   Neck: Normal range of motion. Neck supple. No thyromegaly present.   Cardiovascular: Normal rate, regular rhythm, normal heart sounds and intact distal pulses.  Exam reveals no gallop and no friction rub.    No murmur " heard.  Pulmonary/Chest: Effort normal and breath sounds normal. No respiratory distress. She has no wheezes. She has no rales.   Abdominal: Soft. Bowel sounds are normal. There is no hepatosplenomegaly.   Musculoskeletal: She exhibits no edema or deformity.   Lymphadenopathy:     She has no cervical adenopathy.   Neurological: She is alert and oriented to person, place, and time. She has normal reflexes. She displays normal reflexes. No cranial nerve deficit. She exhibits normal muscle tone. Coordination normal.   Skin: Skin is warm and dry. No rash noted.   Psychiatric: She has a normal mood and affect. Her behavior is normal. Judgment and thought content normal.       ASSESSMENT AND PLAN  Update vaccines if indicated.    begin progressive daily aerobic exercise program    Aundrea was seen today for anxiety and annual exam.    Diagnoses and all orders for this visit:    Annual physical exam  -     Mammo Screening Bilateral With CAD; Future  -     DEXA Bone Density Axial; Future    Depression with anxiety    Labs reviewed. Tdap UTD in 2011.  Flu vaccine declined.  Check with pharmacy on shingles vaccine.  Colonoscopy UTD with Dr. Pope.    Depression.  Increase fluoxetine to 40 mg daily.  Resume counseling and exercise.    [unfilled]    Return in about 2 months (around 2/7/2018).

## 2017-12-08 ENCOUNTER — TELEPHONE (OUTPATIENT)
Dept: GASTROENTEROLOGY | Facility: CLINIC | Age: 52
End: 2017-12-08

## 2017-12-08 PROBLEM — K44.9 HIATAL HERNIA: Status: ACTIVE | Noted: 2017-12-08

## 2017-12-08 RX ORDER — DEXLANSOPRAZOLE 60 MG/1
60 CAPSULE, DELAYED RELEASE ORAL DAILY
Qty: 30 CAPSULE | Refills: 3 | Status: SHIPPED | OUTPATIENT
Start: 2017-12-08 | End: 2018-01-10 | Stop reason: SDUPTHER

## 2017-12-15 RX ORDER — FLUOXETINE HYDROCHLORIDE 20 MG/1
20 CAPSULE ORAL DAILY
Qty: 30 CAPSULE | Refills: 5 | Status: SHIPPED | OUTPATIENT
Start: 2017-12-15 | End: 2017-12-29 | Stop reason: DRUGHIGH

## 2017-12-18 LAB
LAB AP CASE REPORT: NORMAL
Lab: NORMAL
PATH REPORT.FINAL DX SPEC: NORMAL

## 2017-12-29 RX ORDER — FLUOXETINE HYDROCHLORIDE 40 MG/1
40 CAPSULE ORAL DAILY
Qty: 90 CAPSULE | Refills: 1 | Status: SHIPPED | OUTPATIENT
Start: 2017-12-29 | End: 2018-07-03 | Stop reason: SDUPTHER

## 2018-01-05 ENCOUNTER — APPOINTMENT (OUTPATIENT)
Dept: BONE DENSITY | Facility: HOSPITAL | Age: 53
End: 2018-01-05
Attending: FAMILY MEDICINE

## 2018-01-05 ENCOUNTER — APPOINTMENT (OUTPATIENT)
Dept: MAMMOGRAPHY | Facility: HOSPITAL | Age: 53
End: 2018-01-05
Attending: FAMILY MEDICINE

## 2018-01-10 RX ORDER — DEXLANSOPRAZOLE 60 MG/1
60 CAPSULE, DELAYED RELEASE ORAL DAILY
Qty: 90 CAPSULE | Refills: 3 | Status: SHIPPED | OUTPATIENT
Start: 2018-01-10 | End: 2019-09-04 | Stop reason: SDUPTHER

## 2018-02-01 ENCOUNTER — OFFICE VISIT (OUTPATIENT)
Dept: INTERNAL MEDICINE | Facility: CLINIC | Age: 53
End: 2018-02-01

## 2018-02-01 ENCOUNTER — HOSPITAL ENCOUNTER (OUTPATIENT)
Dept: MAMMOGRAPHY | Facility: HOSPITAL | Age: 53
Discharge: HOME | End: 2018-02-01
Attending: FAMILY MEDICINE | Admitting: FAMILY MEDICINE

## 2018-02-01 ENCOUNTER — APPOINTMENT (OUTPATIENT)
Dept: BONE DENSITY | Facility: HOSPITAL | Age: 53
End: 2018-02-01
Attending: FAMILY MEDICINE

## 2018-02-01 VITALS
DIASTOLIC BLOOD PRESSURE: 78 MMHG | SYSTOLIC BLOOD PRESSURE: 116 MMHG | WEIGHT: 171.6 LBS | HEART RATE: 59 BPM | OXYGEN SATURATION: 98 % | BODY MASS INDEX: 33.69 KG/M2 | HEIGHT: 60 IN

## 2018-02-01 DIAGNOSIS — Z00.00 ANNUAL PHYSICAL EXAM: ICD-10-CM

## 2018-02-01 DIAGNOSIS — E03.9 HYPOTHYROIDISM, UNSPECIFIED TYPE: ICD-10-CM

## 2018-02-01 DIAGNOSIS — F41.8 DEPRESSION WITH ANXIETY: Primary | ICD-10-CM

## 2018-02-01 DIAGNOSIS — Z00.00 ROUTINE HEALTH MAINTENANCE: ICD-10-CM

## 2018-02-01 PROCEDURE — 77080 DXA BONE DENSITY AXIAL: CPT

## 2018-02-01 PROCEDURE — 77067 SCR MAMMO BI INCL CAD: CPT

## 2018-02-01 PROCEDURE — 99213 OFFICE O/P EST LOW 20 MIN: CPT | Performed by: FAMILY MEDICINE

## 2018-02-01 PROCEDURE — 77063 BREAST TOMOSYNTHESIS BI: CPT

## 2018-02-01 RX ORDER — LEVOTHYROXINE SODIUM 112 UG/1
112 TABLET ORAL DAILY
Qty: 90 TABLET | Refills: 3 | Status: SHIPPED | OUTPATIENT
Start: 2018-02-01 | End: 2019-03-16 | Stop reason: SDUPTHER

## 2018-02-01 NOTE — PROGRESS NOTES
Subjective     Aundrea Mcgarry is a 52 y.o. female, who presents with a chief complaint of   Chief Complaint   Patient presents with   • Follow-up     2 month f/u    • Depression     rx fluoxetine f/u        HPI     Pt here to f/u on depression.  We increased her fluoxetine to 40 mg daily two months ago.  She reports that she is feeling better and handling her life stress.  Sleep and appetite normal.  Mood normal/as expected.  Denies SI.  Denies side effects.    The following portions of the patient's history were reviewed and updated as appropriate: allergies, current medications, past family history, past medical history, past social history, past surgical history and problem list.    Allergies: Erythromycin and Adhesive tape    Review of Systems   Constitutional: Negative.    HENT: Negative.    Eyes: Negative.    Respiratory: Negative.    Cardiovascular: Negative.    Gastrointestinal: Negative.    Endocrine: Negative.    Genitourinary: Negative.    Musculoskeletal: Positive for arthralgias.   Skin: Negative.    Allergic/Immunologic: Negative.    Neurological: Negative.    Hematological: Negative.        Objective     Wt Readings from Last 3 Encounters:   02/01/18 77.8 kg (171 lb 9.6 oz)   12/07/17 77.5 kg (170 lb 12.8 oz)   12/01/17 76.5 kg (168 lb 9.6 oz)     Temp Readings from Last 3 Encounters:   12/07/17 97.8 °F (36.6 °C)   12/01/17 98 °F (36.7 °C) (Oral)   10/05/17 98 °F (36.7 °C)     BP Readings from Last 3 Encounters:   02/01/18 116/78   12/07/17 114/64   12/01/17 122/71     Pulse Readings from Last 3 Encounters:   02/01/18 59   12/07/17 57   12/01/17 55     Body mass index is 33.51 kg/(m^2).  SpO2 Readings from Last 3 Encounters:   02/01/18 98%   12/07/17 98%   12/01/17 96%       Physical Exam   Constitutional: She is oriented to person, place, and time. She appears well-developed and well-nourished.   HENT:   Head: Normocephalic.   Mouth/Throat: Oropharynx is clear and moist.   Eyes: Conjunctivae and EOM  are normal.   Neck: Normal range of motion. Neck supple.   Cardiovascular: Normal rate, regular rhythm and normal heart sounds.    Pulmonary/Chest: Effort normal and breath sounds normal.   Abdominal: There is no hepatosplenomegaly.   Musculoskeletal: Normal range of motion. She exhibits no edema.   Neurological: She is alert and oriented to person, place, and time.   Skin: Skin is warm and dry.   Psychiatric: She has a normal mood and affect. Her behavior is normal.   Nursing note and vitals reviewed.      Assessment/Plan   Aundrea was seen today for follow-up and depression.    Diagnoses and all orders for this visit:    Depression with anxiety    Well-controlled.  Continue same.    F/U 6 months after labs.    Outpatient Medications Prior to Visit   Medication Sig Dispense Refill   • acyclovir (ZOVIRAX) 400 MG tablet Take 1 tablet by mouth Daily. 30 tablet 6   • dexlansoprazole (DEXILANT) 60 MG capsule Take 1 capsule by mouth Daily for 30 days. 90 capsule 3   • FLUoxetine (PROZAC) 40 MG capsule Take 1 capsule by mouth Daily. 90 capsule 1   • glycopyrrolate (ROBINUL) 2 MG tablet Take 1 tablet by mouth 3 (Three) Times a Day. 60 tablet 6   • Lactobacillus (PROBIOTIC ACIDOPHILUS PO) Take  by mouth.     • metoprolol tartrate (LOPRESSOR) 25 MG tablet TAKE ONE TABLET BY MOUTH TWICE A DAY 60 tablet 5   • MULTIPLE VITAMIN PO Take 1 tablet/day by mouth. PT HOLDING FOR SURGERY     • oxybutynin XL (DITROPAN-XL) 5 MG 24 hr tablet Take 1 tablet by mouth Daily. 30 tablet 5   • ranitidine (ZANTAC) 150 MG tablet Take 150 mg by mouth As Needed.     • traZODone (DESYREL) 100 MG tablet Take 100 mg by mouth Every Night.     • levothyroxine (SYNTHROID, LEVOTHROID) 112 MCG tablet Take 112 mcg by mouth Daily.       No facility-administered medications prior to visit.      New Medications Ordered This Visit   Medications   • levothyroxine (SYNTHROID, LEVOTHROID) 112 MCG tablet     Sig: Take 1 tablet by mouth Daily.     Dispense:  90 tablet      Refill:  3     [unfilled]  Medications Discontinued During This Encounter   Medication Reason   • levothyroxine (SYNTHROID, LEVOTHROID) 112 MCG tablet Reorder       Return in about 6 months (around 8/1/2018).

## 2018-02-20 RX ORDER — OXYBUTYNIN CHLORIDE 5 MG/1
TABLET, EXTENDED RELEASE ORAL
Qty: 30 TABLET | Refills: 4 | Status: SHIPPED | OUTPATIENT
Start: 2018-02-20 | End: 2018-07-24 | Stop reason: SDUPTHER

## 2018-03-06 RX ORDER — TRAZODONE HYDROCHLORIDE 100 MG/1
TABLET ORAL
Qty: 30 TABLET | Refills: 5 | Status: SHIPPED | OUTPATIENT
Start: 2018-03-06 | End: 2019-01-26 | Stop reason: SDUPTHER

## 2018-04-23 RX ORDER — ACYCLOVIR 400 MG/1
TABLET ORAL
Qty: 30 TABLET | Refills: 5 | Status: SHIPPED | OUTPATIENT
Start: 2018-04-23 | End: 2019-03-28 | Stop reason: SDUPTHER

## 2018-04-26 ENCOUNTER — HOSPITAL ENCOUNTER (OUTPATIENT)
Dept: CT IMAGING | Facility: HOSPITAL | Age: 53
Discharge: HOME | End: 2018-04-26
Attending: FAMILY MEDICINE | Admitting: FAMILY MEDICINE

## 2018-04-26 ENCOUNTER — OFFICE VISIT (OUTPATIENT)
Dept: INTERNAL MEDICINE | Facility: CLINIC | Age: 53
End: 2018-04-26

## 2018-04-26 VITALS
BODY MASS INDEX: 33.57 KG/M2 | HEIGHT: 60 IN | DIASTOLIC BLOOD PRESSURE: 64 MMHG | OXYGEN SATURATION: 98 % | SYSTOLIC BLOOD PRESSURE: 104 MMHG | RESPIRATION RATE: 16 BRPM | TEMPERATURE: 97.8 F | WEIGHT: 171 LBS | HEART RATE: 53 BPM

## 2018-04-26 DIAGNOSIS — R10.31 ACUTE RIGHT LOWER QUADRANT PAIN: Primary | ICD-10-CM

## 2018-04-26 LAB
BILIRUB BLD-MCNC: NEGATIVE MG/DL
CLARITY, POC: CLEAR
COLOR UR: YELLOW
GLUCOSE UR STRIP-MCNC: NEGATIVE MG/DL
KETONES UR QL: NEGATIVE
LEUKOCYTE EST, POC: NEGATIVE
NITRITE UR-MCNC: NEGATIVE MG/ML
PH UR: 6.5 [PH] (ref 5–8)
PROT UR STRIP-MCNC: NEGATIVE MG/DL
RBC # UR STRIP: NEGATIVE /UL
SP GR UR: 1.01 (ref 1–1.03)
UROBILINOGEN UR QL: NORMAL

## 2018-04-26 PROCEDURE — 81003 URINALYSIS AUTO W/O SCOPE: CPT | Performed by: FAMILY MEDICINE

## 2018-04-26 PROCEDURE — 74177 CT ABD & PELVIS W/CONTRAST: CPT

## 2018-04-26 PROCEDURE — 0 IOPAMIDOL PER 1 ML: Performed by: FAMILY MEDICINE

## 2018-04-26 PROCEDURE — 0 DIATRIZOATE MEGLUMINE & SODIUM PER 1 ML: Performed by: FAMILY MEDICINE

## 2018-04-26 PROCEDURE — 99213 OFFICE O/P EST LOW 20 MIN: CPT | Performed by: FAMILY MEDICINE

## 2018-04-26 RX ADMIN — IOPAMIDOL 100 ML: 755 INJECTION, SOLUTION INTRAVENOUS at 14:16

## 2018-04-26 RX ADMIN — DIATRIZOATE MEGLUMINE AND DIATRIZOATE SODIUM 30 ML: 600; 100 SOLUTION ORAL; RECTAL at 14:16

## 2018-04-26 NOTE — PROGRESS NOTES
Aundrea Mcgarry is a 52 y.o. female, who presents with a chief complaint of   Chief Complaint   Patient presents with   • Pain     pain in  right lower abdomen that radiates to back  x pain front several days       HPI     Pt c/o right lower abdominal pain X 3-4 days.  Now radiating through to the back.  Worsening.  Burning pain.  Appetite decreased.  Mild nausea with no vomiting.  No diarrhea or constipation.  No fevers.    The following portions of the patient's history were reviewed and updated as appropriate: allergies, current medications, past family history, past medical history, past social history, past surgical history and problem list.    Allergies: Erythromycin and Adhesive tape    Review of Systems   Constitutional: Positive for appetite change. Negative for fever.   HENT: Negative.    Eyes: Negative.    Respiratory: Negative.    Cardiovascular: Negative.    Gastrointestinal: Positive for abdominal pain and nausea. Negative for blood in stool, constipation, diarrhea and vomiting.   Endocrine: Negative.    Genitourinary: Negative for difficulty urinating, dysuria, frequency, urgency, vaginal discharge and vaginal pain.   Skin: Negative.    Allergic/Immunologic: Negative.    Neurological: Negative.    Hematological: Negative.    Psychiatric/Behavioral: Negative.              Wt Readings from Last 3 Encounters:   04/26/18 77.6 kg (171 lb)   02/01/18 77.8 kg (171 lb 9.6 oz)   12/07/17 77.5 kg (170 lb 12.8 oz)     Temp Readings from Last 3 Encounters:   04/26/18 97.8 °F (36.6 °C)   12/07/17 97.8 °F (36.6 °C)   12/01/17 98 °F (36.7 °C) (Oral)     BP Readings from Last 3 Encounters:   04/26/18 104/64   02/01/18 116/78   12/07/17 114/64     Pulse Readings from Last 3 Encounters:   04/26/18 53   02/01/18 59   12/07/17 57     Body mass index is 33.57 kg/m².  @LASTSAO2(3)@    Physical Exam   Constitutional: She is oriented to person, place, and time. She appears well-developed and well-nourished. She appears  distressed (mildly ill appearing).   HENT:   Head: Normocephalic and atraumatic.   Eyes: Conjunctivae and EOM are normal.   Pulmonary/Chest: Effort normal. No respiratory distress.   Abdominal: Soft. Bowel sounds are normal. There is tenderness (RLQ). There is no rebound and no guarding.   Musculoskeletal: Normal range of motion.   Neurological: She is alert and oriented to person, place, and time.   Skin: Skin is warm and dry.   Psychiatric: She has a normal mood and affect. Her behavior is normal.   Nursing note and vitals reviewed.      Results for orders placed or performed during the hospital encounter of 12/01/17   Tissue Pathology Exam - Tissue, Stomach   Result Value Ref Range    Case Report       Surgical Pathology Report                         Case: [id number]                                  Authorizing Provider:  Meliza Pope MD          Collected:           12/01/2017 01:38 PM          Ordering Location:     [facility]   Received:            12/01/2017 04:28 PM                                 OR                                                                           Pathologist:           Johnson Garcia MD                                                          Specimens:   1) - Stomach, gastric tissue biopsy                                                                 2) - Stomach, gastric polyp x2                                                                      3) - Esophagus, Distal, distal esophagus tissue biopsy                                              4) - Esophagus, Mid, mid esophageal tissue biopsy                                          Final Diagnosis       Testing performed at outside laboratory. See scanned report.        Embedded Images             Aundrea was seen today for pain.    Diagnoses and all orders for this visit:    Acute right lower quadrant pain  -     CT Abdomen Pelvis With & Without Contrast      Stat CT.  ? Appendicitis.    Outpatient  Medications Prior to Visit   Medication Sig Dispense Refill   • acyclovir (ZOVIRAX) 400 MG tablet TAKE ONE TABLET BY MOUTH DAILY 30 tablet 5   • FLUoxetine (PROZAC) 40 MG capsule Take 1 capsule by mouth Daily. 90 capsule 1   • Lactobacillus (PROBIOTIC ACIDOPHILUS PO) Take  by mouth.     • levothyroxine (SYNTHROID, LEVOTHROID) 112 MCG tablet Take 1 tablet by mouth Daily. 90 tablet 3   • metoprolol tartrate (LOPRESSOR) 25 MG tablet TAKE ONE TABLET BY MOUTH TWICE A DAY 60 tablet 4   • MULTIPLE VITAMIN PO Take 1 tablet/day by mouth. PT HOLDING FOR SURGERY     • oxybutynin XL (DITROPAN-XL) 5 MG 24 hr tablet TAKE ONE TABLET BY MOUTH DAILY 30 tablet 4   • ranitidine (ZANTAC) 150 MG tablet Take 150 mg by mouth As Needed.     • traZODone (DESYREL) 100 MG tablet TAKE ONE TABLET BY MOUTH EVERY NIGHT AT BEDTIME 30 tablet 5   • traZODone (DESYREL) 100 MG tablet Take 100 mg by mouth Every Night.     • dexlansoprazole (DEXILANT) 60 MG capsule Take 1 capsule by mouth Daily for 30 days. 90 capsule 3   • glycopyrrolate (ROBINUL) 2 MG tablet Take 1 tablet by mouth 3 (Three) Times a Day. 60 tablet 6     No facility-administered medications prior to visit.      No orders of the defined types were placed in this encounter.    [unfilled]  Medications Discontinued During This Encounter   Medication Reason   • glycopyrrolate (ROBINUL) 2 MG tablet *Therapy completed   • traZODone (DESYREL) 100 MG tablet *Therapy completed         Return for Next scheduled follow up.

## 2018-07-03 RX ORDER — FLUOXETINE HYDROCHLORIDE 40 MG/1
CAPSULE ORAL
Qty: 90 CAPSULE | Refills: 1 | Status: SHIPPED | OUTPATIENT
Start: 2018-07-03 | End: 2018-09-27

## 2018-07-24 RX ORDER — OXYBUTYNIN CHLORIDE 5 MG/1
TABLET, EXTENDED RELEASE ORAL
Qty: 90 TABLET | Refills: 1 | Status: SHIPPED | OUTPATIENT
Start: 2018-07-24 | End: 2019-01-28 | Stop reason: SDUPTHER

## 2018-08-31 ENCOUNTER — OFFICE VISIT (OUTPATIENT)
Dept: INTERNAL MEDICINE | Facility: CLINIC | Age: 53
End: 2018-08-31

## 2018-08-31 VITALS
HEIGHT: 59 IN | OXYGEN SATURATION: 98 % | WEIGHT: 176 LBS | DIASTOLIC BLOOD PRESSURE: 78 MMHG | SYSTOLIC BLOOD PRESSURE: 116 MMHG | HEART RATE: 57 BPM | BODY MASS INDEX: 35.48 KG/M2

## 2018-08-31 DIAGNOSIS — R07.89 ATYPICAL CHEST PAIN: ICD-10-CM

## 2018-08-31 DIAGNOSIS — R00.2 PALPITATIONS: Primary | ICD-10-CM

## 2018-08-31 DIAGNOSIS — I49.3 VENTRICULAR PREMATURE BEATS: ICD-10-CM

## 2018-08-31 DIAGNOSIS — E07.9 DISORDER OF THYROID: ICD-10-CM

## 2018-08-31 DIAGNOSIS — R53.83 FATIGUE, UNSPECIFIED TYPE: ICD-10-CM

## 2018-08-31 PROCEDURE — 93000 ELECTROCARDIOGRAM COMPLETE: CPT | Performed by: INTERNAL MEDICINE

## 2018-08-31 PROCEDURE — 99214 OFFICE O/P EST MOD 30 MIN: CPT | Performed by: INTERNAL MEDICINE

## 2018-09-04 ENCOUNTER — TELEPHONE (OUTPATIENT)
Dept: INTERNAL MEDICINE | Facility: CLINIC | Age: 53
End: 2018-09-04

## 2018-09-04 NOTE — TELEPHONE ENCOUNTER
----- Message from Zuly Keene MD sent at 8/31/2018  4:53 PM EDT -----  Call pt about labs.  Labs ok      Pt given info.dg

## 2018-09-27 DIAGNOSIS — F32.A DEPRESSION, UNSPECIFIED DEPRESSION TYPE: Primary | ICD-10-CM

## 2018-09-27 RX ORDER — FLUOXETINE HYDROCHLORIDE 60 MG/1
60 TABLET, FILM COATED ORAL; ORAL DAILY
Qty: 30 TABLET | Refills: 5 | Status: SHIPPED | OUTPATIENT
Start: 2018-09-27 | End: 2019-03-28 | Stop reason: SDUPTHER

## 2018-11-16 ENCOUNTER — HOSPITAL ENCOUNTER (OUTPATIENT)
Dept: CARDIOLOGY | Facility: HOSPITAL | Age: 53
Discharge: HOME | End: 2018-11-16
Attending: INTERNAL MEDICINE

## 2018-11-16 ENCOUNTER — APPOINTMENT (OUTPATIENT)
Dept: CT IMAGING | Facility: HOSPITAL | Age: 53
End: 2018-11-16

## 2018-11-16 ENCOUNTER — APPOINTMENT (OUTPATIENT)
Dept: GENERAL RADIOLOGY | Facility: HOSPITAL | Age: 53
End: 2018-11-16

## 2018-11-16 ENCOUNTER — HOSPITAL ENCOUNTER (OUTPATIENT)
Dept: CARDIOLOGY | Facility: HOSPITAL | Age: 53
Discharge: HOME | End: 2018-11-16
Admitting: INTERNAL MEDICINE

## 2018-11-16 ENCOUNTER — HOSPITAL ENCOUNTER (EMERGENCY)
Facility: HOSPITAL | Age: 53
Discharge: HOME | End: 2018-11-16
Attending: EMERGENCY MEDICINE | Admitting: EMERGENCY MEDICINE

## 2018-11-16 ENCOUNTER — TELEPHONE (OUTPATIENT)
Dept: INTERNAL MEDICINE | Facility: CLINIC | Age: 53
End: 2018-11-16

## 2018-11-16 VITALS
HEART RATE: 56 BPM | BODY MASS INDEX: 36.38 KG/M2 | WEIGHT: 185.3 LBS | RESPIRATION RATE: 16 BRPM | TEMPERATURE: 97.7 F | OXYGEN SATURATION: 96 % | DIASTOLIC BLOOD PRESSURE: 60 MMHG | SYSTOLIC BLOOD PRESSURE: 95 MMHG | HEIGHT: 60 IN

## 2018-11-16 VITALS
RESPIRATION RATE: 17 BRPM | WEIGHT: 180 LBS | BODY MASS INDEX: 35.34 KG/M2 | DIASTOLIC BLOOD PRESSURE: 86 MMHG | HEART RATE: 57 BPM | OXYGEN SATURATION: 95 % | SYSTOLIC BLOOD PRESSURE: 129 MMHG | HEIGHT: 60 IN

## 2018-11-16 DIAGNOSIS — R07.2 PRECORDIAL CHEST PAIN: Primary | ICD-10-CM

## 2018-11-16 DIAGNOSIS — R07.9 CHEST PAIN, UNSPECIFIED TYPE: ICD-10-CM

## 2018-11-16 DIAGNOSIS — R07.9 CHEST PAIN, UNSPECIFIED TYPE: Primary | ICD-10-CM

## 2018-11-16 DIAGNOSIS — R10.13 EPIGASTRIC PAIN: ICD-10-CM

## 2018-11-16 LAB
ALBUMIN SERPL-MCNC: 4.1 G/DL (ref 3.5–5.2)
ALBUMIN/GLOB SERPL: 1.5 G/DL
ALP SERPL-CCNC: 137 U/L (ref 39–117)
ALT SERPL W P-5'-P-CCNC: 131 U/L (ref 1–33)
AMYLASE SERPL-CCNC: 31 U/L (ref 28–100)
ANION GAP SERPL CALCULATED.3IONS-SCNC: 9.4 MMOL/L
AST SERPL-CCNC: 87 U/L (ref 1–32)
BASOPHILS # BLD MANUAL: 0.1 10*3/MM3 (ref 0–0.2)
BASOPHILS NFR BLD AUTO: 2 % (ref 0–1.5)
BILIRUB SERPL-MCNC: 0.4 MG/DL (ref 0.1–1.2)
BUN BLD-MCNC: 13 MG/DL (ref 6–20)
BUN/CREAT SERPL: 19.7 (ref 7–25)
CALCIUM SPEC-SCNC: 9.2 MG/DL (ref 8.6–10.5)
CHLORIDE SERPL-SCNC: 98 MMOL/L (ref 98–107)
CO2 SERPL-SCNC: 26.6 MMOL/L (ref 22–29)
CREAT BLD-MCNC: 0.66 MG/DL (ref 0.57–1)
D DIMER PPP FEU-MCNC: 1.38 MCGFEU/ML (ref 0–0.49)
DEPRECATED RDW RBC AUTO: 45.9 FL (ref 37–54)
EOSINOPHIL # BLD MANUAL: 0.05 10*3/MM3 (ref 0–0.7)
EOSINOPHIL NFR BLD MANUAL: 1 % (ref 0.3–6.2)
ERYTHROCYTE [DISTWIDTH] IN BLOOD BY AUTOMATED COUNT: 13.4 % (ref 11.7–13)
GFR SERPL CREATININE-BSD FRML MDRD: 93 ML/MIN/1.73
GLOBULIN UR ELPH-MCNC: 2.7 GM/DL
GLUCOSE BLD-MCNC: 94 MG/DL (ref 65–99)
HCT VFR BLD AUTO: 39.7 % (ref 35.6–45.5)
HGB BLD-MCNC: 13.5 G/DL (ref 11.9–15.5)
HOLD SPECIMEN: NORMAL
HOLD SPECIMEN: NORMAL
LIPASE SERPL-CCNC: 33 U/L (ref 13–60)
LYMPHOCYTES # BLD MANUAL: 2.07 10*3/MM3 (ref 0.9–4.8)
LYMPHOCYTES NFR BLD MANUAL: 15 % (ref 5–12)
LYMPHOCYTES NFR BLD MANUAL: 41 % (ref 19.6–45.3)
MCH RBC QN AUTO: 31.9 PG (ref 26.9–32)
MCHC RBC AUTO-ENTMCNC: 34 G/DL (ref 32.4–36.3)
MCV RBC AUTO: 93.9 FL (ref 80.5–98.2)
MONOCYTES # BLD AUTO: 0.76 10*3/MM3 (ref 0.2–1.2)
NEUTROPHILS # BLD AUTO: 1.86 10*3/MM3 (ref 1.9–8.1)
NEUTROPHILS NFR BLD MANUAL: 37 % (ref 42.7–76)
PLAT MORPH BLD: NORMAL
PLATELET # BLD AUTO: 177 10*3/MM3 (ref 140–500)
PMV BLD AUTO: 11.2 FL (ref 6–12)
POTASSIUM BLD-SCNC: 4 MMOL/L (ref 3.5–5.2)
PROT SERPL-MCNC: 6.8 G/DL (ref 6–8.5)
RBC # BLD AUTO: 4.23 10*6/MM3 (ref 3.9–5.2)
RBC MORPH BLD: NORMAL
SCAN SLIDE: NORMAL
SODIUM BLD-SCNC: 134 MMOL/L (ref 136–145)
TROPONIN T SERPL-MCNC: <0.01 NG/ML (ref 0–0.03)
VARIANT LYMPHS NFR BLD MANUAL: 4 % (ref 0–5)
WBC MORPH BLD: NORMAL
WBC NRBC COR # BLD: 5.04 10*3/MM3 (ref 4.5–10.7)
WHOLE BLOOD HOLD SPECIMEN: NORMAL
WHOLE BLOOD HOLD SPECIMEN: NORMAL

## 2018-11-16 PROCEDURE — 25010000002 PERFLUTREN (DEFINITY) 8.476 MG IN SODIUM CHLORIDE 0.9 % 10 ML INJECTION: Performed by: INTERNAL MEDICINE

## 2018-11-16 PROCEDURE — 99204 OFFICE O/P NEW MOD 45 MIN: CPT | Performed by: INTERNAL MEDICINE

## 2018-11-16 PROCEDURE — 99285 EMERGENCY DEPT VISIT HI MDM: CPT

## 2018-11-16 PROCEDURE — 93005 ELECTROCARDIOGRAM TRACING: CPT | Performed by: EMERGENCY MEDICINE

## 2018-11-16 PROCEDURE — 85025 COMPLETE CBC W/AUTO DIFF WBC: CPT | Performed by: EMERGENCY MEDICINE

## 2018-11-16 PROCEDURE — 85007 BL SMEAR W/DIFF WBC COUNT: CPT | Performed by: EMERGENCY MEDICINE

## 2018-11-16 PROCEDURE — 0 IOPAMIDOL PER 1 ML: Performed by: EMERGENCY MEDICINE

## 2018-11-16 PROCEDURE — 83690 ASSAY OF LIPASE: CPT | Performed by: INTERNAL MEDICINE

## 2018-11-16 PROCEDURE — 93306 TTE W/DOPPLER COMPLETE: CPT

## 2018-11-16 PROCEDURE — 84484 ASSAY OF TROPONIN QUANT: CPT | Performed by: EMERGENCY MEDICINE

## 2018-11-16 PROCEDURE — 82150 ASSAY OF AMYLASE: CPT | Performed by: INTERNAL MEDICINE

## 2018-11-16 PROCEDURE — 71046 X-RAY EXAM CHEST 2 VIEWS: CPT

## 2018-11-16 PROCEDURE — 71275 CT ANGIOGRAPHY CHEST: CPT

## 2018-11-16 PROCEDURE — 94760 N-INVAS EAR/PLS OXIMETRY 1: CPT

## 2018-11-16 PROCEDURE — 93306 TTE W/DOPPLER COMPLETE: CPT | Performed by: INTERNAL MEDICINE

## 2018-11-16 PROCEDURE — 85379 FIBRIN DEGRADATION QUANT: CPT | Performed by: EMERGENCY MEDICINE

## 2018-11-16 PROCEDURE — 80053 COMPREHEN METABOLIC PANEL: CPT | Performed by: EMERGENCY MEDICINE

## 2018-11-16 PROCEDURE — 93010 ELECTROCARDIOGRAM REPORT: CPT | Performed by: INTERNAL MEDICINE

## 2018-11-16 RX ORDER — ASPIRIN 325 MG
325 TABLET ORAL ONCE
Status: COMPLETED | OUTPATIENT
Start: 2018-11-16 | End: 2018-11-16

## 2018-11-16 RX ORDER — NITROGLYCERIN 0.4 MG/1
0.4 TABLET SUBLINGUAL
Status: DISCONTINUED | OUTPATIENT
Start: 2018-11-16 | End: 2018-11-16 | Stop reason: HOSPADM

## 2018-11-16 RX ADMIN — ASPIRIN 325 MG: 325 TABLET ORAL at 11:38

## 2018-11-16 RX ADMIN — IOPAMIDOL 95 ML: 755 INJECTION, SOLUTION INTRAVENOUS at 14:06

## 2018-11-16 RX ADMIN — PERFLUTREN 1.5 ML: 6.52 INJECTION, SUSPENSION INTRAVENOUS at 16:28

## 2018-11-16 RX ADMIN — SODIUM CHLORIDE, POTASSIUM CHLORIDE, SODIUM LACTATE AND CALCIUM CHLORIDE 500 ML: 600; 310; 30; 20 INJECTION, SOLUTION INTRAVENOUS at 12:54

## 2018-11-16 RX ADMIN — NITROGLYCERIN 0.4 MG: 0.4 TABLET, ORALLY DISINTEGRATING SUBLINGUAL at 11:54

## 2018-11-16 RX ADMIN — NITROGLYCERIN 0.5 INCH: 20 OINTMENT TOPICAL at 12:53

## 2018-11-16 RX ADMIN — NITROGLYCERIN 0.4 MG: 0.4 TABLET, ORALLY DISINTEGRATING SUBLINGUAL at 11:39

## 2018-11-16 NOTE — ED NOTES
Called report to MARTA Steen at [facility] [place].     Didi Enrique RN  11/16/18 3634       Didi Enrique RN  11/16/18 1965

## 2018-11-16 NOTE — ED PROVIDER NOTES
EMERGENCY DEPARTMENT ENCOUNTER    CHIEF COMPLAINT  Chief Complaint: Chest pain  History given by: Patient  History limited by: None  Room Number: 11/11  PMD: Spenser Hodgson MD      HPI:  Pt is a 53 y.o. female who presents complaining of waxing and waning, L sided chest pain since 0830 today. She states, at its worst, pain was a 8/10 in severity, but is currently a 6/10. Pt also c/o nausea and mild SOA, but denies vomiting. She states pain is not alleviated or exacerbated by any factors. Pt father hx of MI in his 50s.    Duration: Since 0830 today  Onset: Gradual  Timing: Constant, waxing and waning  Location: L sided chest  Intensity/Severity: At its worst, pain is a 8/10 in severity, but is currently a 6/10  Progression: Unchanged  Associated Symptoms: Nausea and mild SOA  Aggravating Factors: None  Alleviating Factors: None  Previous Episodes: None  Treatment before arrival: None    PAST MEDICAL HISTORY  Active Ambulatory Problems     Diagnosis Date Noted   • Abdominal pain 04/13/2016   • Arthritis 04/13/2016   • Bunion 04/13/2016   • Gastroesophageal reflux disease with esophagitis 04/13/2016   • Irregular menstrual cycle 04/13/2016   • Palpitations 04/13/2016   • Dyskinesia of esophagus 04/13/2016   • Ventricular premature beats 04/13/2016   • Recurrent herpes labialis 04/13/2016   • Disorder of thyroid 04/13/2016   • Dizziness 04/13/2016   • Shoulder, capsulitis, adhesive 04/15/2016   • Bilateral shoulder pain 04/18/2016   • Right facial numbness 04/20/2016   • Bursitis/tendonitis, shoulder 05/20/2016   • Annual physical exam 12/05/2016   • Depression with anxiety 09/07/2017   • Dysphagia 10/13/2017   • Osteopenia of both thighs 12/07/2017   • Hiatal hernia 12/08/2017     Resolved Ambulatory Problems     Diagnosis Date Noted   • No Resolved Ambulatory Problems     Past Medical History:   Diagnosis Date   • Arthritis    • Broken jaw (CMS/HCC)    • Esophageal spasm    • Gallbladder disorder    • GERD  (gastroesophageal reflux disease)    • Hypothyroidism    • IBS (irritable bowel syndrome)    • Overactive bladder    • PONV (postoperative nausea and vomiting)    • PVC (premature ventricular contraction)        PAST SURGICAL HISTORY  Past Surgical History:   Procedure Laterality Date   • BREAST BIOPSY Right early    • COLONOSCOPY     • COLONOSCOPY  ?    DR BRAR   • EYE SURGERY Bilateral     LENS REPLACEMENT   • HIP SURGERY Left    • HYSTERECTOMY     • HYSTERECTOMY     • INGUINAL HERNIA REPAIR Left    • MANDIBLE FRACTURE SURGERY     • TONSILLECTOMY     • UPPER GASTROINTESTINAL ENDOSCOPY N/A 2011    Normal upper endosopy - Dr. Meliza Pope       FAMILY HISTORY  Family History   Problem Relation Age of Onset   • Thyroid disease Mother    • Hypertension Mother    • Diabetes Father    • Hypertension Father    • Heart disease Father    • Breast cancer Maternal Aunt    • Breast cancer Paternal Aunt    • Heart disease Brother    • Malig Hyperthermia Neg Hx        SOCIAL HISTORY  Social History     Socioeconomic History   • Marital status:      Spouse name: Not on file   • Number of children: Not on file   • Years of education: Not on file   • Highest education level: Not on file   Social Needs   • Financial resource strain: Not on file   • Food insecurity - worry: Not on file   • Food insecurity - inability: Not on file   • Transportation needs - medical: Not on file   • Transportation needs - non-medical: Not on file   Occupational History   • Not on file   Tobacco Use   • Smoking status: Former Smoker     Packs/day: 0.25     Years: 3.00     Pack years: 0.75     Types: Cigarettes     Last attempt to quit: 2004     Years since quittin.3   • Smokeless tobacco: Never Used   Substance and Sexual Activity   • Alcohol use: Yes     Alcohol/week: 0.6 oz     Types: 1 Glasses of wine per week   • Drug use: No   • Sexual activity: Defer   Other Topics Concern   • Not on file   Social History  Narrative   • Not on file       ALLERGIES  Erythromycin and Adhesive tape    REVIEW OF SYSTEMS  Review of Systems   Constitutional: Negative for fever.   HENT: Negative for sore throat.    Eyes: Negative.    Respiratory: Positive for shortness of breath (mild). Negative for cough.    Cardiovascular: Positive for chest pain (L sided).   Gastrointestinal: Positive for nausea. Negative for abdominal pain, diarrhea and vomiting.   Genitourinary: Negative for dysuria.   Musculoskeletal: Negative for neck pain.   Skin: Negative for rash.   Allergic/Immunologic: Negative.    Neurological: Negative for weakness, numbness and headaches.   Hematological: Negative.    Psychiatric/Behavioral: Negative.    All other systems reviewed and are negative.      PHYSICAL EXAM  ED Triage Vitals [11/16/18 1057]   Temp Heart Rate Resp BP SpO2   97.5 °F (36.4 °C) 67 18 -- 97 %      Temp src Heart Rate Source Patient Position BP Location FiO2 (%)   Tympanic Monitor -- -- --       Physical Exam   Constitutional: She is oriented to person, place, and time. No distress.   HENT:   Head: Normocephalic and atraumatic.   Eyes: EOM are normal. Pupils are equal, round, and reactive to light.   Neck: Normal range of motion. Neck supple.   Cardiovascular: Normal rate, regular rhythm and normal heart sounds.   Pulmonary/Chest: Effort normal and breath sounds normal. No respiratory distress. She exhibits tenderness (over sternum and L lower chest wall).   Abdominal: Soft. There is no tenderness. There is no rebound and no guarding.   Musculoskeletal: Normal range of motion. She exhibits no edema (pedal).   Neurological: She is alert and oriented to person, place, and time. She has normal sensation and normal strength.   Skin: Skin is warm and dry. No rash noted.   Psychiatric: Mood and affect normal.   Nursing note and vitals reviewed.      LAB RESULTS  Lab Results (last 24 hours)     Procedure Component Value Units Date/Time    CBC & Differential  [[id number]] Collected:  11/16/18 1130    Specimen:  Blood Updated:  11/16/18 1213    Narrative:       The following orders were created for panel order CBC & Differential.  Procedure                               Abnormality         Status                     ---------                               -----------         ------                     Manual Differential[[id number]]          Abnormal            Final result               Scan Slide[[id number]]                                       Final result               CBC Auto Differential[[id number]]        Abnormal            Final result                 Please view results for these tests on the individual orders.    Comprehensive Metabolic Panel [[id number]]  (Abnormal) Collected:  11/16/18 1130    Specimen:  Blood Updated:  11/16/18 1203     Glucose 94 mg/dL      BUN 13 mg/dL      Creatinine 0.66 mg/dL      Sodium 134 mmol/L      Potassium 4.0 mmol/L      Chloride 98 mmol/L      CO2 26.6 mmol/L      Calcium 9.2 mg/dL      Total Protein 6.8 g/dL      Albumin 4.10 g/dL      ALT (SGPT) 131 U/L      AST (SGOT) 87 U/L      Alkaline Phosphatase 137 U/L      Total Bilirubin 0.4 mg/dL      eGFR Non African Amer 93 mL/min/1.73      Globulin 2.7 gm/dL      A/G Ratio 1.5 g/dL      BUN/Creatinine Ratio 19.7     Anion Gap 9.4 mmol/L     D-dimer, Quantitative [[id number]]  (Abnormal) Collected:  11/16/18 1130    Specimen:  Blood Updated:  11/16/18 1207     D-Dimer, Quantitative 1.38 MCGFEU/mL     Narrative:       The Stago D-Dimer test used in conjunction with a clinical pretest probability (PTP) assessment model, has been approved by the FDA to rule out the presence of venous thromboembolism (VTE) in outpatients suspected of deep venous thrombosis (DVT) or pulmonary embolism (PE).     Troponin [[id number]]  (Normal) Collected:  11/16/18 1130    Specimen:  Blood Updated:  11/16/18 1203     Troponin T <0.010 ng/mL     Narrative:       Troponin T Reference Ranges:  Less than 0.03  ng/mL:    Negative for AMI  0.03 to 0.09 ng/mL:      Indeterminant for AMI  Greater than 0.09 ng/mL: Positive for AMI    CBC Auto Differential [[id number]]  (Abnormal) Collected:  11/16/18 1130    Specimen:  Blood Updated:  11/16/18 1213     WBC 5.04 10*3/mm3      RBC 4.23 10*6/mm3      Hemoglobin 13.5 g/dL      Hematocrit 39.7 %      MCV 93.9 fL      MCH 31.9 pg      MCHC 34.0 g/dL      RDW 13.4 %      RDW-SD 45.9 fl      MPV 11.2 fL      Platelets 177 10*3/mm3     Scan Slide [[id number]] Collected:  11/16/18 1130    Specimen:  Blood Updated:  11/16/18 1213     Scan Slide --     Comment: See Manual Differential Results       Manual Differential [[id number]]  (Abnormal) Collected:  11/16/18 1130    Specimen:  Blood Updated:  11/16/18 1213     Neutrophil % 37.0 %      Lymphocyte % 41.0 %      Monocyte % 15.0 %      Eosinophil % 1.0 %      Basophil % 2.0 %      Atypical Lymphocyte % 4.0 %      Neutrophils Absolute 1.86 10*3/mm3      Lymphocytes Absolute 2.07 10*3/mm3      Monocytes Absolute 0.76 10*3/mm3      Eosinophils Absolute 0.05 10*3/mm3      Basophils Absolute 0.10 10*3/mm3      RBC Morphology Normal     WBC Morphology Normal     Platelet Morphology Normal          I ordered the above labs and reviewed the results    RADIOLOGY  CT Angiogram Chest With Contrast   Final Result   1.  No pulmonary embolism.   2.  Sub-6 mm noncalcified pulmonary nodule within the left lower lobe.   This patient is at increased risk for lung cancer, follow-up chest CT in   12 months be considered. This patient is not at increased risk for lung   cancer, no further follow-up is necessary.       Findings were discussed with Dr. Fu by telephone 2:40 PM on   11/16/2018.       This report was finalized on 11/16/2018 2:49 PM by Dr. Cedric Moseley M.D.          XR Chest 2 View   Final Result           I ordered the above noted radiological studies. Interpreted by radiologist. Reviewed by me in PACS.       PROCEDURES  Procedures  EKG           EKG time: 1100  Rhythm/Rate: Sinus bradycardia rate 56  P waves and ND: Nml P waves, 1st degree AV block  QRS, axis: Nml axis, Nml QRS  ST and T waves: Non specific T wave changes anteriorly     Interpreted Contemporaneously by me, independently viewed  Unchanged compared to prior 8/31/18    HEART SCORE:    History  Highly suspicious              2    Moderately suspicious             1    Slightly or non-suspicious             0    ECG  Significant ST depression              2    Nonspecific repol disturbance            1    Normal                           0    Age  > or = 65                          2     46-65                           1    < or = 45                          0    Risk factors (hypercholesterolemia, HTN, DM, smoking, pos fam hx, obesity)                            > or = to 3 RF for atherosclerotic dx   2    1 or 2                 1    No risk factors                0    Troponin > or = 3x normal limit               2    1-3x normal limit    1    < or = Normal limit    0        HEART Score Key:  Scores 0-3: 0.9-1.7% risk of adverse cardiac event. In the HEART Score study, these patients were discharged (0.99% in the retrospective study, 1.7% in the prospective study)  Scores 4-6: 12-16.6% risk of adverse cardiac event. In the HEART Score study, these patients were admitted to the hospital. (11.6% retrospective, 16.6% prospective)  Scores ?7: 50-65% risk of adverse cardiac event. In the HEART Score study, these patients were candidates for early invasive measures. (65.2% retrospective, 50.1% prospective)      This patient's HEART score is 4.      PROGRESS AND CONSULTS   1057 Ordered EKG for further evaluation.    1120 Ordered CBC, troponin, D-dimer, CMP, and CXR for further evaluation. Ordered nitroglycerin and ASA for treatment of pain.    1220 Ordered CTA chest for further evaluation of elevated D-dimer.    1233 Rechecked with pt, who states her pain has improved, and discussed that pt has  elevated D-dimer. Plan to obtain CTA chest for further evaluation.    1439 Placed [place] to [facility] [place].    1448 Discussed pt with Dr. Handley [facility], who requests pt to be sent to office.    1450 Rechecked with pt and discussed plan to send pt to cardiology office for further workup. Pt understands and agrees with the plan, all questions answered.    MEDICAL DECISION MAKING  Results were reviewed/discussed with the patient and they were also made aware of online access. Pt also made aware that some labs, such as cultures, will not be resulted during ER visit and follow up with PMD is necessary.     MDM  Number of Diagnoses or Management Options  Precordial chest pain:   Diagnosis management comments: Patient presented the ER complaining of chest pain.  EKG was unchanged.  Initial troponin was negative.  D-dimer was elevated.  CTA of the chest was negative for pulmonary embolism.  Patient's chest pain was relieved with nitroglycerin.  She had a heart score 4.  Case was discussed with Dr. Handley and the patient will be sent to her office from the ER for further evaluation.       Amount and/or Complexity of Data Reviewed  Clinical lab tests: ordered and reviewed (D-dimer= 1.38, troponin= <0.010, WBC= 5.04, ALT= 131, AST= 87)  Tests in the radiology section of CPT®: ordered and reviewed (CXR is negative.)  Tests in the medicine section of CPT®: ordered and reviewed (See procedure notes for EKG.)  Decide to obtain previous medical records or to obtain history from someone other than the patient: yes    Patient Progress  Patient progress: stable        HEART Score (for prediction of 6-week risk of major adverse cardiac event) reviewed and/or performed as part of the patient evaluation and treatment planning process.  The result associated with this review/performance is: 4      DIAGNOSIS  Final diagnoses:   Precordial chest pain       DISPOSITION  DISCHARGE TO [facility]    Latest Documented Vital Signs:  As of  3:01 PM  BP- 112/45 HR- 53 Temp- 97.5 °F (36.4 °C) (Tympanic) O2 sat- 94%    --  Documentation assistance provided by mirela Son for Dr. Fu.  Information recorded by the manibmic was done at my direction and has been verified and validated by me.     Audelia Son  11/16/18 4643       Hollis Fu MD  11/16/18 8747

## 2018-11-16 NOTE — TELEPHONE ENCOUNTER
----- Message from Corrie Stacy sent at 11/16/2018 12:24 PM EST -----  Regarding: information  ROHIT    Patient phones this morning with complaint of chest pain, nausea and back pain.  Explained to patient based on these symptoms we would recommend she go to the ER.    Patient voiced understanding.

## 2018-11-16 NOTE — PROGRESS NOTES
This is a lady without significant risk factors for heart disease. Last night she started with severe abdominal pain in her epigastric area. She went to bed and woke up and felt okay and then she became diaphoretic and started having abdominal pian again and was nauseous but she did not throw up or have diarrhea. Then the pain started radiating up into her chest, she described it as being under her rib cage and under her left breast. It briefly got better after 2 nitroglycerin in the emergency room but then came back. She has not eaten anything today to know if it is worse with eating. She is not sure if it is worse with exertion. She does have a history of PVCs and takes metoprolol. In the emergency room she had a normal troponin. Her D-dimer was elevated so she had a CT PE protocol which did not show any pulmonary embolism. I reviewed the CT scan and the proximal portion of her coronary arteries look pretty normal though I cannot see the distal vessels.

## 2018-11-16 NOTE — PROGRESS NOTES
Patient Name: Aundrea Mcgarry  :1965  53 y.o.    Date of Admission: 2018  Encounter Provider: Silvia Beal MD  Date of Encounter Visit: 18  Place of Service: [facility] CARDIOLOGY [facility]  Referring Provider: Bhavna Handley MD      Chief complaint:  Abdominal and chest pain    History of Present Illness:    This is a lady without significant risk factors for heart disease. Last night she started with severe abdominal pain in her epigastric area. She went to bed and woke up and felt okay and then she became diaphoretic and started having abdominal pian again and was nauseous but she did not throw up or have diarrhea. Then the pain started radiating up into her chest, she described it as being under her rib cage and under her left breast. It briefly got better after 2 nitroglycerin in the emergency room but then came back. She has not eaten anything today to know if it is worse with eating. She is not sure if it is worse with exertion. She does have a history of PVCs and takes metoprolol. In the emergency room she had a normal troponin. Her D-dimer was elevated so she had a CT PE protocol which did not show any pulmonary embolism. I reviewed the CT scan and the proximal portion of her coronary arteries look pretty normal though I cannot see the distal vessels.        Past Medical History:   Diagnosis Date   • Arthritis    • Broken jaw (CMS/HCC)          • Esophageal spasm    • Gallbladder disorder    • GERD (gastroesophageal reflux disease)    • Hypothyroidism    • IBS (irritable bowel syndrome)    • Overactive bladder    • PONV (postoperative nausea and vomiting)    • PVC (premature ventricular contraction)        Past Surgical History:   Procedure Laterality Date   • BREAST BIOPSY Right early    • COLONOSCOPY     • COLONOSCOPY  ?    DR BRAR   • EYE SURGERY Bilateral     LENS REPLACEMENT   • HIP SURGERY Left    • HYSTERECTOMY      • HYSTERECTOMY  2002   • INGUINAL HERNIA REPAIR Left    • MANDIBLE FRACTURE SURGERY     • TONSILLECTOMY     • UPPER GASTROINTESTINAL ENDOSCOPY N/A 07/20/2011    Normal upper endosopy - Dr. Meliza Pope         Prior to Admission medications    Medication Sig Start Date End Date Taking? Authorizing Provider   acyclovir (ZOVIRAX) 400 MG tablet TAKE ONE TABLET BY MOUTH DAILY 4/23/18   Spenser Hodgson MD   Cholecalciferol (VITAMIN D3) 5000 units capsule capsule Take 5,000 Units by mouth Daily.    ProviderMateo MD   dexlansoprazole (DEXILANT) 60 MG capsule Take 1 capsule by mouth Daily for 30 days. 1/10/18 11/16/18  Meliza Pope MD   FLUoxetine (PROzac) 60 MG tablet Take 1 tablet by mouth Daily. 9/27/18   Spenser Hodgson MD   Lactobacillus (PROBIOTIC ACIDOPHILUS PO) Take 1 capsule by mouth Daily.    Mateo Alonzo MD   levothyroxine (SYNTHROID, LEVOTHROID) 112 MCG tablet Take 1 tablet by mouth Daily. 2/1/18   Spenser Hodgson MD   metoprolol tartrate (LOPRESSOR) 25 MG tablet TAKE ONE TABLET BY MOUTH TWICE A DAY 7/9/18   Spenser Hodgson MD   MULTIPLE VITAMIN PO Take 1 tablet by mouth Daily. PT HOLDING FOR SURGERY 1/21/13   Mateo Alonzo MD   oxybutynin XL (DITROPAN-XL) 5 MG 24 hr tablet TAKE ONE TABLET BY MOUTH DAILY 7/24/18   Spenser Hodgson MD   ranitidine (ZANTAC) 150 MG tablet Take 150 mg by mouth As Needed for Heartburn or Indigestion. 1/21/13   Mateo Alonzo MD   traZODone (DESYREL) 100 MG tablet TAKE ONE TABLET BY MOUTH EVERY NIGHT AT BEDTIME 3/6/18   Spenser Hodgson MD       Allergies   Allergen Reactions   • Erythromycin Nausea And Vomiting   • Adhesive Tape Rash       Social History     Socioeconomic History   • Marital status:      Spouse name: Not on file   • Number of children: Not on file   • Years of education: Not on file   • Highest education level: Not on file   Tobacco Use   • Smoking status: Former Smoker     Packs/day: 0.25  "    Years: 3.00     Pack years: 0.75     Types: Cigarettes     Last attempt to quit: 2004     Years since quittin.3   • Smokeless tobacco: Never Used   Substance and Sexual Activity   • Alcohol use: Yes     Alcohol/week: 0.6 oz     Types: 1 Glasses of wine per week   • Drug use: No   • Sexual activity: Defer       Family History   Problem Relation Age of Onset   • Thyroid disease Mother    • Hypertension Mother    • Diabetes Father    • Hypertension Father    • Heart disease Father    • Breast cancer Maternal Aunt    • Breast cancer Paternal Aunt    • Heart disease Brother    • Malig Hyperthermia Neg Hx        REVIEW OF SYSTEMS:   Review of Systems   Constitution: Positive for malaise/fatigue. Negative for fever, weight gain and weight loss.   HENT: Negative for ear pain, hearing loss, nosebleeds and sore throat.    Eyes: Negative for double vision, pain, vision loss in left eye and vision loss in right eye.   Cardiovascular:        See history of present illness.   Respiratory: Positive for shortness of breath. Negative for cough, sleep disturbances due to breathing, snoring and wheezing.    Endocrine: Negative for cold intolerance, heat intolerance and polyuria.   Skin: Negative for itching, poor wound healing and rash.   Musculoskeletal: Negative for joint pain, joint swelling and myalgias.   Gastrointestinal: Positive for nausea. Negative for abdominal pain, diarrhea, hematochezia and vomiting.   Genitourinary: Negative for hematuria and hesitancy.   Neurological: Positive for headaches. Negative for numbness, paresthesias and seizures.   Psychiatric/Behavioral: Negative for depression. The patient is not nervous/anxious.           Objective:     Vitals:    18 1518   BP: 129/86   BP Location: Right arm   Pulse: 57   Resp: 17   SpO2: 95%   Weight: 81.6 kg (180 lb)   Height: 152.4 cm (60\")     Body mass index is 35.15 kg/m².    Physical Exam   Constitutional: She appears well-developed.   HENT: "   Head: Normocephalic and atraumatic.   Eyes: Conjunctivae and lids are normal. Pupils are equal, round, and reactive to light. Lids are everted and swept, no foreign bodies found.   Neck: Normal range of motion. No JVD present. Carotid bruit is not present. No tracheal deviation present. No thyroid mass present.   Cardiovascular: Normal rate, regular rhythm and normal heart sounds.   Pulses:       Dorsalis pedis pulses are 2+ on the right side, and 2+ on the left side.   Pulmonary/Chest: Effort normal and breath sounds normal.   Abdominal: Normal appearance and bowel sounds are normal. There is tenderness in the epigastric area.   Musculoskeletal: Normal range of motion.   Neurological: She is alert. She has normal strength.   Skin: Skin is warm, dry and intact.   Psychiatric: She has a normal mood and affect. Her behavior is normal.   Vitals reviewed.        Lab Review:     Results from last 7 days   Lab Units  11/16/18   1130   SODIUM mmol/L  134*   POTASSIUM mmol/L  4.0   CHLORIDE mmol/L  98   CO2 mmol/L  26.6   BUN mg/dL  13   CREATININE mg/dL  0.66   CALCIUM mg/dL  9.2   BILIRUBIN mg/dL  0.4   ALK PHOS U/L  137*   ALT (SGPT) U/L  131*   AST (SGOT) U/L  87*   GLUCOSE mg/dL  94     Results from last 7 days   Lab Units  11/16/18   1130   TROPONIN T ng/mL  <0.010     Results from last 7 days   Lab Units  11/16/18   1130   WBC 10*3/mm3  5.04   HEMOGLOBIN g/dL  13.5   HEMATOCRIT %  39.7   PLATELETS 10*3/mm3  177                   Procedures    I reviewed her EKG from the ER.    Assessment and Plan:       1. Abdominal pain and chest pain. She did have an elevated D-dimer so she had a CT PE protocol at the hospital and that looked normal. I reviewed the images and looked at the coronary arteries and I thought it looked pretty good. I really did not feel like her pain was cardiac in origin but I did check an echocardiogram which was normal. From her labs the only other abnormality was elevated alkaline phosphatase and  liver function. I called the ER but Hollis Fu MD, had already left. I spoke with Dr. Carr. He offered to see the patient again if she would like. I discussed this with her but she says she wants to go home. I told her she should go back to the emergency room if her pain gets worse; otherwise, she should follow up with her primary doctor next week.  2. History of PVCs.        Silvia Beal MD  11/16/18  4:42 PM

## 2018-11-16 NOTE — ED NOTES
"Pt c/o chest pain that radiates under left breath with nausea and \"cold sweats\" since 0800 this AM. Pt also c/o epigastric pain.     Didi Enrique RN  11/16/18 1122    "

## 2018-11-19 LAB
ASCENDING AORTA: 2.8 CM
BH CV ECHO MEAS - ACS: 2 CM
BH CV ECHO MEAS - AO MAX PG (FULL): 3.5 MMHG
BH CV ECHO MEAS - AO MAX PG: 6 MMHG
BH CV ECHO MEAS - AO MEAN PG (FULL): 1.5 MMHG
BH CV ECHO MEAS - AO MEAN PG: 2.8 MMHG
BH CV ECHO MEAS - AO ROOT AREA (BSA CORRECTED): 1.5
BH CV ECHO MEAS - AO ROOT AREA: 5.9 CM^2
BH CV ECHO MEAS - AO ROOT DIAM: 2.7 CM
BH CV ECHO MEAS - AO V2 MAX: 122.2 CM/SEC
BH CV ECHO MEAS - AO V2 MEAN: 78.9 CM/SEC
BH CV ECHO MEAS - AO V2 VTI: 26.4 CM
BH CV ECHO MEAS - AVA(I,A): 2 CM^2
BH CV ECHO MEAS - AVA(I,D): 2 CM^2
BH CV ECHO MEAS - AVA(V,A): 2 CM^2
BH CV ECHO MEAS - AVA(V,D): 2 CM^2
BH CV ECHO MEAS - BSA(HAYCOCK): 1.9 M^2
BH CV ECHO MEAS - BSA: 1.8 M^2
BH CV ECHO MEAS - BZI_BMI: 35.2 KILOGRAMS/M^2
BH CV ECHO MEAS - BZI_METRIC_HEIGHT: 152.4 CM
BH CV ECHO MEAS - BZI_METRIC_WEIGHT: 81.6 KG
BH CV ECHO MEAS - EDV(MOD-SP2): 83 ML
BH CV ECHO MEAS - EDV(MOD-SP4): 83 ML
BH CV ECHO MEAS - EDV(TEICH): 89.3 ML
BH CV ECHO MEAS - EF(CUBED): 73.5 %
BH CV ECHO MEAS - EF(MOD-BP): 67 %
BH CV ECHO MEAS - EF(MOD-SP2): 66.3 %
BH CV ECHO MEAS - EF(MOD-SP4): 67.5 %
BH CV ECHO MEAS - EF(TEICH): 65.5 %
BH CV ECHO MEAS - ESV(MOD-SP2): 28 ML
BH CV ECHO MEAS - ESV(MOD-SP4): 27 ML
BH CV ECHO MEAS - ESV(TEICH): 30.8 ML
BH CV ECHO MEAS - FS: 35.8 %
BH CV ECHO MEAS - IVS/LVPW: 0.91
BH CV ECHO MEAS - IVSD: 0.85 CM
BH CV ECHO MEAS - LAT PEAK E' VEL: 7 CM/SEC
BH CV ECHO MEAS - LV DIASTOLIC VOL/BSA (35-75): 46.5 ML/M^2
BH CV ECHO MEAS - LV MASS(C)D: 128.7 GRAMS
BH CV ECHO MEAS - LV MASS(C)DI: 72.1 GRAMS/M^2
BH CV ECHO MEAS - LV MAX PG: 2.5 MMHG
BH CV ECHO MEAS - LV MEAN PG: 1.3 MMHG
BH CV ECHO MEAS - LV SYSTOLIC VOL/BSA (12-30): 15.1 ML/M^2
BH CV ECHO MEAS - LV V1 MAX: 78.6 CM/SEC
BH CV ECHO MEAS - LV V1 MEAN: 54.5 CM/SEC
BH CV ECHO MEAS - LV V1 VTI: 17.1 CM
BH CV ECHO MEAS - LVIDD: 4.4 CM
BH CV ECHO MEAS - LVIDS: 2.8 CM
BH CV ECHO MEAS - LVLD AP2: 7.2 CM
BH CV ECHO MEAS - LVLD AP4: 7 CM
BH CV ECHO MEAS - LVLS AP2: 6.9 CM
BH CV ECHO MEAS - LVLS AP4: 6.1 CM
BH CV ECHO MEAS - LVOT AREA (M): 3.1 CM^2
BH CV ECHO MEAS - LVOT AREA: 3.1 CM^2
BH CV ECHO MEAS - LVOT DIAM: 2 CM
BH CV ECHO MEAS - LVPWD: 0.94 CM
BH CV ECHO MEAS - MED PEAK E' VEL: 8 CM/SEC
BH CV ECHO MEAS - MV A DUR: 0.17 SEC
BH CV ECHO MEAS - MV A MAX VEL: 69.4 CM/SEC
BH CV ECHO MEAS - MV DEC SLOPE: 159 CM/SEC^2
BH CV ECHO MEAS - MV DEC TIME: 0.32 SEC
BH CV ECHO MEAS - MV E MAX VEL: 50.9 CM/SEC
BH CV ECHO MEAS - MV E/A: 0.73
BH CV ECHO MEAS - MV MAX PG: 2.1 MMHG
BH CV ECHO MEAS - MV MEAN PG: 0.98 MMHG
BH CV ECHO MEAS - MV P1/2T MAX VEL: 51.9 CM/SEC
BH CV ECHO MEAS - MV P1/2T: 95.6 MSEC
BH CV ECHO MEAS - MV V2 MAX: 73.3 CM/SEC
BH CV ECHO MEAS - MV V2 MEAN: 46.1 CM/SEC
BH CV ECHO MEAS - MV V2 VTI: 27.6 CM
BH CV ECHO MEAS - MVA P1/2T LCG: 4.2 CM^2
BH CV ECHO MEAS - MVA(P1/2T): 2.3 CM^2
BH CV ECHO MEAS - MVA(VTI): 1.9 CM^2
BH CV ECHO MEAS - PA ACC TIME: 0.12 SEC
BH CV ECHO MEAS - PA MAX PG (FULL): 1.2 MMHG
BH CV ECHO MEAS - PA MAX PG: 2.6 MMHG
BH CV ECHO MEAS - PA PR(ACCEL): 25.1 MMHG
BH CV ECHO MEAS - PA V2 MAX: 80.1 CM/SEC
BH CV ECHO MEAS - PULM A REVS DUR: 0.12 SEC
BH CV ECHO MEAS - PULM A REVS VEL: 21.8 CM/SEC
BH CV ECHO MEAS - PULM DIAS VEL: 28.1 CM/SEC
BH CV ECHO MEAS - PULM S/D: 1
BH CV ECHO MEAS - PULM SYS VEL: 29.1 CM/SEC
BH CV ECHO MEAS - PVA(V,A): 2.1 CM^2
BH CV ECHO MEAS - PVA(V,D): 2.1 CM^2
BH CV ECHO MEAS - QP/QS: 0.78
BH CV ECHO MEAS - RAP SYSTOLE: 3 MMHG
BH CV ECHO MEAS - RV MAX PG: 1.3 MMHG
BH CV ECHO MEAS - RV MEAN PG: 0.72 MMHG
BH CV ECHO MEAS - RV V1 MAX: 57.7 CM/SEC
BH CV ECHO MEAS - RV V1 MEAN: 39.8 CM/SEC
BH CV ECHO MEAS - RV V1 VTI: 14.3 CM
BH CV ECHO MEAS - RVOT AREA: 2.9 CM^2
BH CV ECHO MEAS - RVOT DIAM: 1.9 CM
BH CV ECHO MEAS - SI(AO): 86.6 ML/M^2
BH CV ECHO MEAS - SI(CUBED): 35.9 ML/M^2
BH CV ECHO MEAS - SI(LVOT): 29.5 ML/M^2
BH CV ECHO MEAS - SI(MOD-SP2): 30.8 ML/M^2
BH CV ECHO MEAS - SI(MOD-SP4): 31.4 ML/M^2
BH CV ECHO MEAS - SI(TEICH): 32.8 ML/M^2
BH CV ECHO MEAS - SUP REN AO DIAM: 1.9 CM
BH CV ECHO MEAS - SV(AO): 154.6 ML
BH CV ECHO MEAS - SV(CUBED): 64.1 ML
BH CV ECHO MEAS - SV(LVOT): 52.7 ML
BH CV ECHO MEAS - SV(MOD-SP2): 55 ML
BH CV ECHO MEAS - SV(MOD-SP4): 56 ML
BH CV ECHO MEAS - SV(RVOT): 41.2 ML
BH CV ECHO MEAS - SV(TEICH): 58.5 ML
BH CV ECHO MEAS - TAPSE (>1.6): 2 CM2
BH CV ECHO MEASUREMENTS AVERAGE E/E' RATIO: 6.79
BH CV XLRA - RV BASE: 2.4 CM
BH CV XLRA - TDI S': 12 CM/SEC
LEFT ATRIUM VOLUME INDEX: 16 ML/M2
LV EF 2D ECHO EST: 67 %
MAXIMAL PREDICTED HEART RATE: 166 BPM
SINUS: 2.3 CM
STJ: 2.7 CM
STRESS TARGET HR: 141 BPM

## 2019-01-28 RX ORDER — OXYBUTYNIN CHLORIDE 5 MG/1
5 TABLET, EXTENDED RELEASE ORAL DAILY
Qty: 90 TABLET | Refills: 1 | Status: SHIPPED | OUTPATIENT
Start: 2019-01-28 | End: 2019-01-29 | Stop reason: SDUPTHER

## 2019-01-28 RX ORDER — TRAZODONE HYDROCHLORIDE 100 MG/1
TABLET ORAL
Qty: 90 TABLET | Refills: 1 | Status: SHIPPED | OUTPATIENT
Start: 2019-01-28 | End: 2019-10-21 | Stop reason: SDUPTHER

## 2019-01-29 RX ORDER — OXYBUTYNIN CHLORIDE 5 MG/1
TABLET, EXTENDED RELEASE ORAL
Qty: 90 TABLET | Refills: 3 | Status: SHIPPED | OUTPATIENT
Start: 2019-01-29 | End: 2020-01-29

## 2019-02-01 ENCOUNTER — RESULTS ENCOUNTER (OUTPATIENT)
Dept: INTERNAL MEDICINE | Facility: CLINIC | Age: 54
End: 2019-02-01

## 2019-02-01 DIAGNOSIS — Z00.00 ROUTINE HEALTH MAINTENANCE: ICD-10-CM

## 2019-02-01 DIAGNOSIS — E03.9 HYPOTHYROIDISM, UNSPECIFIED TYPE: ICD-10-CM

## 2019-02-01 DIAGNOSIS — F41.8 DEPRESSION WITH ANXIETY: ICD-10-CM

## 2019-03-18 RX ORDER — LEVOTHYROXINE SODIUM 112 UG/1
TABLET ORAL
Qty: 90 TABLET | Refills: 1 | Status: SHIPPED | OUTPATIENT
Start: 2019-03-18 | End: 2019-09-04 | Stop reason: SDUPTHER

## 2019-03-28 DIAGNOSIS — F32.A DEPRESSION, UNSPECIFIED DEPRESSION TYPE: ICD-10-CM

## 2019-03-28 RX ORDER — FLUOXETINE HYDROCHLORIDE 60 MG/1
TABLET, FILM COATED ORAL; ORAL
Qty: 90 TABLET | Refills: 1 | Status: SHIPPED | OUTPATIENT
Start: 2019-03-28 | End: 2019-09-15 | Stop reason: SDUPTHER

## 2019-03-28 RX ORDER — ACYCLOVIR 400 MG/1
TABLET ORAL
Qty: 90 TABLET | Refills: 1 | Status: SHIPPED | OUTPATIENT
Start: 2019-03-28 | End: 2020-02-10

## 2019-08-29 LAB
ALBUMIN SERPL-MCNC: 4.2 G/DL (ref 3.5–5.2)
ALBUMIN/GLOB SERPL: 1.7 G/DL
ALP SERPL-CCNC: 85 U/L (ref 39–117)
ALT SERPL-CCNC: 19 U/L (ref 1–33)
AST SERPL-CCNC: 19 U/L (ref 1–32)
BASOPHILS # BLD AUTO: 0.06 10*3/MM3 (ref 0–0.2)
BASOPHILS NFR BLD AUTO: 0.9 % (ref 0–1.5)
BILIRUB SERPL-MCNC: 0.2 MG/DL (ref 0.2–1.2)
BUN SERPL-MCNC: 15 MG/DL (ref 6–20)
BUN/CREAT SERPL: 17 (ref 7–25)
CALCIUM SERPL-MCNC: 9.6 MG/DL (ref 8.6–10.5)
CHLORIDE SERPL-SCNC: 100 MMOL/L (ref 98–107)
CHOLEST SERPL-MCNC: 209 MG/DL (ref 0–200)
CHOLEST/HDLC SERPL: 3.94 {RATIO}
CO2 SERPL-SCNC: 26 MMOL/L (ref 22–29)
CREAT SERPL-MCNC: 0.88 MG/DL (ref 0.57–1)
EOSINOPHIL # BLD AUTO: 0.2 10*3/MM3 (ref 0–0.4)
EOSINOPHIL NFR BLD AUTO: 3.1 % (ref 0.3–6.2)
ERYTHROCYTE [DISTWIDTH] IN BLOOD BY AUTOMATED COUNT: 12.7 % (ref 12.3–15.4)
GLOBULIN SER CALC-MCNC: 2.5 GM/DL
GLUCOSE SERPL-MCNC: 86 MG/DL (ref 65–99)
HCT VFR BLD AUTO: 41.8 % (ref 34–46.6)
HDLC SERPL-MCNC: 53 MG/DL (ref 40–60)
HGB BLD-MCNC: 13.7 G/DL (ref 12–15.9)
IMM GRANULOCYTES # BLD AUTO: 0.01 10*3/MM3 (ref 0–0.05)
IMM GRANULOCYTES NFR BLD AUTO: 0.2 % (ref 0–0.5)
LDLC SERPL CALC-MCNC: 122 MG/DL (ref 0–100)
LYMPHOCYTES # BLD AUTO: 2.1 10*3/MM3 (ref 0.7–3.1)
LYMPHOCYTES NFR BLD AUTO: 32.6 % (ref 19.6–45.3)
MCH RBC QN AUTO: 33.6 PG (ref 26.6–33)
MCHC RBC AUTO-ENTMCNC: 32.8 G/DL (ref 31.5–35.7)
MCV RBC AUTO: 102.5 FL (ref 79–97)
MONOCYTES # BLD AUTO: 0.49 10*3/MM3 (ref 0.1–0.9)
MONOCYTES NFR BLD AUTO: 7.6 % (ref 5–12)
NEUTROPHILS # BLD AUTO: 3.58 10*3/MM3 (ref 1.7–7)
NEUTROPHILS NFR BLD AUTO: 55.6 % (ref 42.7–76)
NRBC BLD AUTO-RTO: 0 /100 WBC (ref 0–0.2)
PLATELET # BLD AUTO: 267 10*3/MM3 (ref 140–450)
POTASSIUM SERPL-SCNC: 4.9 MMOL/L (ref 3.5–5.2)
PROT SERPL-MCNC: 6.7 G/DL (ref 6–8.5)
RBC # BLD AUTO: 4.08 10*6/MM3 (ref 3.77–5.28)
SODIUM SERPL-SCNC: 140 MMOL/L (ref 136–145)
T4 FREE SERPL-MCNC: 1.32 NG/DL (ref 0.93–1.7)
TRIGL SERPL-MCNC: 168 MG/DL (ref 0–150)
TSH SERPL DL<=0.005 MIU/L-ACNC: 20.8 UIU/ML (ref 0.27–4.2)
VLDLC SERPL CALC-MCNC: 33.6 MG/DL
WBC # BLD AUTO: 6.44 10*3/MM3 (ref 3.4–10.8)

## 2019-09-04 ENCOUNTER — OFFICE VISIT (OUTPATIENT)
Dept: INTERNAL MEDICINE | Facility: CLINIC | Age: 54
End: 2019-09-04

## 2019-09-04 VITALS
BODY MASS INDEX: 35.34 KG/M2 | DIASTOLIC BLOOD PRESSURE: 76 MMHG | HEART RATE: 61 BPM | SYSTOLIC BLOOD PRESSURE: 122 MMHG | TEMPERATURE: 98.2 F | OXYGEN SATURATION: 95 % | WEIGHT: 180 LBS | RESPIRATION RATE: 14 BRPM | HEIGHT: 60 IN

## 2019-09-04 DIAGNOSIS — K22.4 DYSKINESIA OF ESOPHAGUS: ICD-10-CM

## 2019-09-04 DIAGNOSIS — Z00.00 ROUTINE HEALTH MAINTENANCE: ICD-10-CM

## 2019-09-04 DIAGNOSIS — F41.8 DEPRESSION WITH ANXIETY: ICD-10-CM

## 2019-09-04 DIAGNOSIS — F32.A DEPRESSION, UNSPECIFIED DEPRESSION TYPE: ICD-10-CM

## 2019-09-04 DIAGNOSIS — N32.81 OAB (OVERACTIVE BLADDER): ICD-10-CM

## 2019-09-04 DIAGNOSIS — E03.9 HYPOTHYROIDISM, UNSPECIFIED TYPE: ICD-10-CM

## 2019-09-04 DIAGNOSIS — K21.00 GASTROESOPHAGEAL REFLUX DISEASE WITH ESOPHAGITIS: ICD-10-CM

## 2019-09-04 DIAGNOSIS — B00.1 RECURRENT HERPES LABIALIS: ICD-10-CM

## 2019-09-04 DIAGNOSIS — J06.9 ACUTE URI: Primary | ICD-10-CM

## 2019-09-04 DIAGNOSIS — M85.852 OSTEOPENIA OF BOTH THIGHS: ICD-10-CM

## 2019-09-04 DIAGNOSIS — M85.851 OSTEOPENIA OF BOTH THIGHS: ICD-10-CM

## 2019-09-04 DIAGNOSIS — R00.2 PALPITATIONS: ICD-10-CM

## 2019-09-04 PROCEDURE — 99214 OFFICE O/P EST MOD 30 MIN: CPT | Performed by: FAMILY MEDICINE

## 2019-09-04 RX ORDER — DEXLANSOPRAZOLE 60 MG/1
60 CAPSULE, DELAYED RELEASE ORAL EVERY EVENING
COMMUNITY
End: 2021-09-01 | Stop reason: SDUPTHER

## 2019-09-04 RX ORDER — ALBUTEROL SULFATE 90 UG/1
2 AEROSOL, METERED RESPIRATORY (INHALATION) EVERY 4 HOURS PRN
Qty: 1 INHALER | Refills: 1 | Status: SHIPPED | OUTPATIENT
Start: 2019-09-04 | End: 2020-07-01

## 2019-09-04 RX ORDER — LEVOTHYROXINE SODIUM 137 UG/1
137 TABLET ORAL DAILY
Qty: 30 TABLET | Refills: 2 | Status: SHIPPED | OUTPATIENT
Start: 2019-09-04 | End: 2019-11-26 | Stop reason: SDUPTHER

## 2019-09-04 RX ORDER — BENZONATATE 200 MG/1
200 CAPSULE ORAL 3 TIMES DAILY PRN
Qty: 30 CAPSULE | Refills: 0 | Status: SHIPPED | OUTPATIENT
Start: 2019-09-04 | End: 2020-07-01

## 2019-09-04 RX ORDER — NAPROXEN 500 MG/1
500 TABLET ORAL AS NEEDED
COMMUNITY
End: 2020-07-20

## 2019-09-04 RX ORDER — BUPROPION HYDROCHLORIDE 150 MG/1
150 TABLET ORAL EVERY MORNING
Qty: 30 TABLET | Refills: 5 | Status: SHIPPED | OUTPATIENT
Start: 2019-09-04 | End: 2019-10-28 | Stop reason: SDUPTHER

## 2019-09-04 NOTE — PROGRESS NOTES
Aundrea Mcgarry is a 53 y.o. female, who presents with a chief complaint of   Chief Complaint   Patient presents with   • Anxiety   • Hypothyroidism       HPI     1. Anxiety and depression.  Pt reports this flared, triggered by the sudden death of her father 6 months ago.  Her mood feels down; she feels unmotivated and overwhelmed.  Denies SI.  Appetite okay.  Sleep feels less restful.    2. Hypothyroidism.  Pt states she has been compliant with levothyroxine 112 mcg.    3. URI.  Pt reports 4 days of cough, wheezing, congestion, sore throat.  Denies fevers.    The following portions of the patient's history were reviewed and updated as appropriate: allergies, current medications, past family history, past medical history, past social history, past surgical history and problem list.    Allergies: Erythromycin and Adhesive tape    Review of Systems   Constitutional: Positive for fatigue. Negative for fever.   HENT: Positive for congestion, postnasal drip and sore throat.    Eyes: Negative.    Respiratory: Positive for cough and wheezing.    Musculoskeletal: Positive for arthralgias.   Skin: Negative.    Neurological: Negative.    Psychiatric/Behavioral: Positive for dysphoric mood. Negative for suicidal ideas.             Wt Readings from Last 3 Encounters:   09/04/19 81.6 kg (180 lb)   11/16/18 81.6 kg (180 lb)   11/16/18 84.1 kg (185 lb 4.8 oz)     Temp Readings from Last 3 Encounters:   09/04/19 98.2 °F (36.8 °C) (Oral)   11/16/18 97.7 °F (36.5 °C) (Oral)   04/26/18 97.8 °F (36.6 °C)     BP Readings from Last 3 Encounters:   09/04/19 122/76   11/16/18 129/86   11/16/18 95/60     Pulse Readings from Last 3 Encounters:   09/04/19 61   11/16/18 57   11/16/18 56     Body mass index is 35.15 kg/m².  @LASTSAO2(3)@    Physical Exam   Constitutional: She is oriented to person, place, and time. She appears well-developed and well-nourished. No distress.   HENT:   Head: Normocephalic and atraumatic.   Right Ear: A  middle ear effusion is present.   Left Ear: A middle ear effusion is present.   Nose: Mucosal edema present.   Mouth/Throat: Oropharynx is clear and moist.   Eyes: Conjunctivae and EOM are normal.   Neck: Neck supple. No thyromegaly present.   Cardiovascular: Normal rate, regular rhythm and normal heart sounds.   Pulmonary/Chest: Effort normal. No respiratory distress. She has wheezes.   Musculoskeletal: Normal range of motion. She exhibits no edema.   Neurological: She is alert and oriented to person, place, and time.   Skin: Skin is warm and dry.   Psychiatric: She has a normal mood and affect. Her behavior is normal.   Nursing note and vitals reviewed.      Results for orders placed or performed in visit on 02/01/19   Comprehensive Metabolic Panel   Result Value Ref Range    Glucose 86 65 - 99 mg/dL    BUN 15 6 - 20 mg/dL    Creatinine 0.88 0.57 - 1.00 mg/dL    eGFR Non African Am 67 >60 mL/min/1.73    eGFR African Am 81 >60 mL/min/1.73    BUN/Creatinine Ratio 17.0 7.0 - 25.0    Sodium 140 136 - 145 mmol/L    Potassium 4.9 3.5 - 5.2 mmol/L    Chloride 100 98 - 107 mmol/L    Total CO2 26.0 22.0 - 29.0 mmol/L    Calcium 9.6 8.6 - 10.5 mg/dL    Total Protein 6.7 6.0 - 8.5 g/dL    Albumin 4.20 3.50 - 5.20 g/dL    Globulin 2.5 gm/dL    A/G Ratio 1.7 g/dL    Total Bilirubin 0.2 0.2 - 1.2 mg/dL    Alkaline Phosphatase 85 39 - 117 U/L    AST (SGOT) 19 1 - 32 U/L    ALT (SGPT) 19 1 - 33 U/L   Lipid Panel With / Chol / HDL Ratio   Result Value Ref Range    Total Cholesterol 209 (H) 0 - 200 mg/dL    Triglycerides 168 (H) 0 - 150 mg/dL    HDL Cholesterol 53 40 - 60 mg/dL    VLDL Cholesterol 33.6 mg/dL    LDL Cholesterol  122 (H) 0 - 100 mg/dL    Chol/HDL Ratio 3.94    TSH   Result Value Ref Range    TSH 20.800 (H) 0.270 - 4.200 uIU/mL   T4, Free   Result Value Ref Range    Free T4 1.32 0.93 - 1.70 ng/dL   CBC & Differential   Result Value Ref Range    WBC 6.44 3.40 - 10.80 10*3/mm3    RBC 4.08 3.77 - 5.28 10*6/mm3     Hemoglobin 13.7 12.0 - 15.9 g/dL    Hematocrit 41.8 34.0 - 46.6 %    .5 (H) 79.0 - 97.0 fL    MCH 33.6 (H) 26.6 - 33.0 pg    MCHC 32.8 31.5 - 35.7 g/dL    RDW 12.7 12.3 - 15.4 %    Platelets 267 140 - 450 10*3/mm3    Neutrophil Rel % 55.6 42.7 - 76.0 %    Lymphocyte Rel % 32.6 19.6 - 45.3 %    Monocyte Rel % 7.6 5.0 - 12.0 %    Eosinophil Rel % 3.1 0.3 - 6.2 %    Basophil Rel % 0.9 0.0 - 1.5 %    Neutrophils Absolute 3.58 1.70 - 7.00 10*3/mm3    Lymphocytes Absolute 2.10 0.70 - 3.10 10*3/mm3    Monocytes Absolute 0.49 0.10 - 0.90 10*3/mm3    Eosinophils Absolute 0.20 0.00 - 0.40 10*3/mm3    Basophils Absolute 0.06 0.00 - 0.20 10*3/mm3    Immature Granulocyte Rel % 0.2 0.0 - 0.5 %    Immature Grans Absolute 0.01 0.00 - 0.05 10*3/mm3    nRBC 0.0 0.0 - 0.2 /100 WBC           Aundrea was seen today for anxiety and hypothyroidism.    Diagnoses and all orders for this visit:    Acute URI  -     albuterol sulfate  (90 Base) MCG/ACT inhaler; Inhale 2 puffs Every 4 (Four) Hours As Needed for Wheezing.  -     benzonatate (TESSALON) 200 MG capsule; Take 1 capsule by mouth 3 (Three) Times a Day As Needed for Cough.    Hypothyroidism, unspecified type  -     levothyroxine (SYNTHROID, LEVOTHROID) 137 MCG tablet; Take 1 tablet by mouth Daily.  -     TSH; Future  -     T4, Free; Future    Depression with anxiety  -     buPROPion XL (WELLBUTRIN XL) 150 MG 24 hr tablet; Take 1 tablet by mouth Every Morning.    OAB (overactive bladder)    Osteopenia of both thighs    Gastroesophageal reflux disease with esophagitis    Dyskinesia of esophagus    Recurrent herpes labialis    Palpitations    Routine health maintenance  -     Mammo Screening Bilateral With CAD; Future    Depression, unspecified depression type    1. Hypothyroidism.  Underreplaced.  TSH > 20.  Increase levothyroxine from 112 mcg to 137 mcg.  Recheck levels in 6 weeks.    2. Depression with anxiety.  Not adequately controlled.  Continue fluoxetine.  Add  bupropion XL.  F/U 6 weeks.  #1 may be contributing.    3. OAB.  Controlled.  Continue same.    4. Osteopenia.  Doesn't tolerate calcium supplement.  Trial of extended release calcium.  Repeat dexa scan in 2/2020.    5. GERD.  Controlled with Dexilant.    6. Esophageal spasms.  Controlled with trazodone.    7. Recurrent herpes labialis.  Suppressed with daily acyclovir.    8. Palpitations.  Controlled with metoprolol.    9.  Routine health maint.  Mammogram ordered.  Doesn't need pap smear d/t hysterectomy.  Colonoscopy due 12/2023.  Shingrix at pharmacy.  Declines flu vaccine.  Tdap UTD.    10. URI.  Viral.  Albuterol prn wheezing.  Anticipatory guidance given.      Outpatient Medications Prior to Visit   Medication Sig Dispense Refill   • acyclovir (ZOVIRAX) 400 MG tablet TAKE ONE TABLET BY MOUTH DAILY 90 tablet 1   • dexlansoprazole (DEXILANT) 60 MG capsule Take 60 mg by mouth Daily.     • FLUoxetine (PROzac) 60 MG tablet TAKE ONE TABLET BY MOUTH DAILY 90 tablet 1   • metoprolol tartrate (LOPRESSOR) 25 MG tablet TAKE ONE TABLET BY MOUTH TWICE A  tablet 0   • MULTIPLE VITAMIN PO Take 1 tablet by mouth Daily. PT HOLDING FOR SURGERY     • naproxen (NAPROSYN) 500 MG tablet Take 500 mg by mouth As Needed.     • oxybutynin XL (DITROPAN-XL) 5 MG 24 hr tablet TAKE ONE TABLET BY MOUTH DAILY 90 tablet 3   • ranitidine (ZANTAC) 150 MG tablet Take 150 mg by mouth As Needed for Heartburn or Indigestion.     • traZODone (DESYREL) 100 MG tablet TAKE ONE TABLET BY MOUTH EVERY NIGHT AT BEDTIME 90 tablet 1   • levothyroxine (SYNTHROID, LEVOTHROID) 112 MCG tablet TAKE ONE TABLET BY MOUTH DAILY 90 tablet 1   • Cholecalciferol (VITAMIN D3) 5000 units capsule capsule Take 5,000 Units by mouth Daily.     • dexlansoprazole (DEXILANT) 60 MG capsule Take 1 capsule by mouth Daily for 30 days. 90 capsule 3   • Lactobacillus (PROBIOTIC ACIDOPHILUS PO) Take 1 capsule by mouth Daily.       No facility-administered medications prior to  visit.      New Medications Ordered This Visit   Medications   • levothyroxine (SYNTHROID, LEVOTHROID) 137 MCG tablet     Sig: Take 1 tablet by mouth Daily.     Dispense:  30 tablet     Refill:  2   • buPROPion XL (WELLBUTRIN XL) 150 MG 24 hr tablet     Sig: Take 1 tablet by mouth Every Morning.     Dispense:  30 tablet     Refill:  5   • albuterol sulfate  (90 Base) MCG/ACT inhaler     Sig: Inhale 2 puffs Every 4 (Four) Hours As Needed for Wheezing.     Dispense:  1 inhaler     Refill:  1   • benzonatate (TESSALON) 200 MG capsule     Sig: Take 1 capsule by mouth 3 (Three) Times a Day As Needed for Cough.     Dispense:  30 capsule     Refill:  0     [unfilled]  Medications Discontinued During This Encounter   Medication Reason   • Cholecalciferol (VITAMIN D3) 5000 units capsule capsule Duplicate order   • Lactobacillus (PROBIOTIC ACIDOPHILUS PO) Duplicate order   • dexlansoprazole (DEXILANT) 60 MG capsule Duplicate order   • levothyroxine (SYNTHROID, LEVOTHROID) 112 MCG tablet Reorder         Return in about 6 weeks (around 10/16/2019).

## 2019-09-09 ENCOUNTER — RESULTS ENCOUNTER (OUTPATIENT)
Dept: INTERNAL MEDICINE | Facility: CLINIC | Age: 54
End: 2019-09-09

## 2019-09-09 DIAGNOSIS — E03.9 HYPOTHYROIDISM, UNSPECIFIED TYPE: ICD-10-CM

## 2019-09-15 DIAGNOSIS — F32.A DEPRESSION, UNSPECIFIED DEPRESSION TYPE: ICD-10-CM

## 2019-09-16 RX ORDER — FLUOXETINE HYDROCHLORIDE 60 MG/1
TABLET, FILM COATED ORAL; ORAL
Qty: 30 TABLET | Refills: 5 | Status: SHIPPED | OUTPATIENT
Start: 2019-09-16 | End: 2020-03-16

## 2019-09-23 ENCOUNTER — APPOINTMENT (OUTPATIENT)
Dept: GENERAL RADIOLOGY | Facility: HOSPITAL | Age: 54
End: 2019-09-23

## 2019-09-23 PROCEDURE — 99285 EMERGENCY DEPT VISIT HI MDM: CPT

## 2019-09-23 PROCEDURE — 71046 X-RAY EXAM CHEST 2 VIEWS: CPT

## 2019-09-23 RX ORDER — SODIUM CHLORIDE 0.9 % (FLUSH) 0.9 %
10 SYRINGE (ML) INJECTION AS NEEDED
Status: DISCONTINUED | OUTPATIENT
Start: 2019-09-23 | End: 2019-09-24 | Stop reason: HOSPADM

## 2019-09-24 ENCOUNTER — APPOINTMENT (OUTPATIENT)
Dept: CT IMAGING | Facility: HOSPITAL | Age: 54
End: 2019-09-24

## 2019-09-24 ENCOUNTER — HOSPITAL ENCOUNTER (EMERGENCY)
Facility: HOSPITAL | Age: 54
Discharge: HOME | End: 2019-09-24
Attending: EMERGENCY MEDICINE | Admitting: EMERGENCY MEDICINE

## 2019-09-24 VITALS
SYSTOLIC BLOOD PRESSURE: 113 MMHG | DIASTOLIC BLOOD PRESSURE: 81 MMHG | BODY MASS INDEX: 35.34 KG/M2 | HEIGHT: 60 IN | OXYGEN SATURATION: 94 % | TEMPERATURE: 96.7 F | HEART RATE: 59 BPM | RESPIRATION RATE: 16 BRPM | WEIGHT: 180 LBS

## 2019-09-24 DIAGNOSIS — R93.89 ABNORMAL CHEST CT: ICD-10-CM

## 2019-09-24 DIAGNOSIS — R10.10 UPPER ABDOMINAL PAIN: ICD-10-CM

## 2019-09-24 DIAGNOSIS — R07.9 CHEST PAIN, UNSPECIFIED TYPE: Primary | ICD-10-CM

## 2019-09-24 LAB
ALBUMIN SERPL-MCNC: 4.5 G/DL (ref 3.5–5.2)
ALBUMIN/GLOB SERPL: 2 G/DL
ALP SERPL-CCNC: 96 U/L (ref 39–117)
ALT SERPL W P-5'-P-CCNC: 15 U/L (ref 1–33)
ANION GAP SERPL CALCULATED.3IONS-SCNC: 14 MMOL/L (ref 5–15)
AST SERPL-CCNC: 19 U/L (ref 1–32)
BASOPHILS # BLD AUTO: 0.06 10*3/MM3 (ref 0–0.2)
BASOPHILS NFR BLD AUTO: 0.8 % (ref 0–1.5)
BILIRUB SERPL-MCNC: 0.3 MG/DL (ref 0.2–1.2)
BUN BLD-MCNC: 10 MG/DL (ref 6–20)
BUN/CREAT SERPL: 14.5 (ref 7–25)
CALCIUM SPEC-SCNC: 8.8 MG/DL (ref 8.6–10.5)
CHLORIDE SERPL-SCNC: 106 MMOL/L (ref 98–107)
CO2 SERPL-SCNC: 25 MMOL/L (ref 22–29)
CREAT BLD-MCNC: 0.69 MG/DL (ref 0.57–1)
DEPRECATED RDW RBC AUTO: 47.4 FL (ref 37–54)
EOSINOPHIL # BLD AUTO: 0.18 10*3/MM3 (ref 0–0.4)
EOSINOPHIL NFR BLD AUTO: 2.5 % (ref 0.3–6.2)
ERYTHROCYTE [DISTWIDTH] IN BLOOD BY AUTOMATED COUNT: 12.8 % (ref 12.3–15.4)
GFR SERPL CREATININE-BSD FRML MDRD: 89 ML/MIN/1.73
GLOBULIN UR ELPH-MCNC: 2.2 GM/DL
GLUCOSE BLD-MCNC: 88 MG/DL (ref 65–99)
HCT VFR BLD AUTO: 38.4 % (ref 34–46.6)
HGB BLD-MCNC: 12.5 G/DL (ref 12–15.9)
HOLD SPECIMEN: NORMAL
HOLD SPECIMEN: NORMAL
IMM GRANULOCYTES # BLD AUTO: 0.03 10*3/MM3 (ref 0–0.05)
IMM GRANULOCYTES NFR BLD AUTO: 0.4 % (ref 0–0.5)
LIPASE SERPL-CCNC: 18 U/L (ref 13–60)
LYMPHOCYTES # BLD AUTO: 1.49 10*3/MM3 (ref 0.7–3.1)
LYMPHOCYTES NFR BLD AUTO: 20.4 % (ref 19.6–45.3)
MCH RBC QN AUTO: 32.8 PG (ref 26.6–33)
MCHC RBC AUTO-ENTMCNC: 32.6 G/DL (ref 31.5–35.7)
MCV RBC AUTO: 100.8 FL (ref 79–97)
MONOCYTES # BLD AUTO: 0.6 10*3/MM3 (ref 0.1–0.9)
MONOCYTES NFR BLD AUTO: 8.2 % (ref 5–12)
NEUTROPHILS # BLD AUTO: 4.95 10*3/MM3 (ref 1.7–7)
NEUTROPHILS NFR BLD AUTO: 67.7 % (ref 42.7–76)
NRBC BLD AUTO-RTO: 0 /100 WBC (ref 0–0.2)
PLATELET # BLD AUTO: 258 10*3/MM3 (ref 140–450)
PMV BLD AUTO: 11.5 FL (ref 6–12)
POTASSIUM BLD-SCNC: 4 MMOL/L (ref 3.5–5.2)
PROT SERPL-MCNC: 6.7 G/DL (ref 6–8.5)
RBC # BLD AUTO: 3.81 10*6/MM3 (ref 3.77–5.28)
SODIUM BLD-SCNC: 145 MMOL/L (ref 136–145)
TROPONIN T SERPL-MCNC: <0.01 NG/ML (ref 0–0.03)
TROPONIN T SERPL-MCNC: <0.01 NG/ML (ref 0–0.03)
WBC NRBC COR # BLD: 7.31 10*3/MM3 (ref 3.4–10.8)
WHOLE BLOOD HOLD SPECIMEN: NORMAL
WHOLE BLOOD HOLD SPECIMEN: NORMAL

## 2019-09-24 PROCEDURE — 80053 COMPREHEN METABOLIC PANEL: CPT | Performed by: EMERGENCY MEDICINE

## 2019-09-24 PROCEDURE — 71275 CT ANGIOGRAPHY CHEST: CPT

## 2019-09-24 PROCEDURE — 84484 ASSAY OF TROPONIN QUANT: CPT | Performed by: EMERGENCY MEDICINE

## 2019-09-24 PROCEDURE — 93005 ELECTROCARDIOGRAM TRACING: CPT | Performed by: PHYSICIAN ASSISTANT

## 2019-09-24 PROCEDURE — 83690 ASSAY OF LIPASE: CPT | Performed by: PHYSICIAN ASSISTANT

## 2019-09-24 PROCEDURE — 93010 ELECTROCARDIOGRAM REPORT: CPT | Performed by: INTERNAL MEDICINE

## 2019-09-24 PROCEDURE — 0 IOPAMIDOL PER 1 ML: Performed by: EMERGENCY MEDICINE

## 2019-09-24 PROCEDURE — 85025 COMPLETE CBC W/AUTO DIFF WBC: CPT

## 2019-09-24 PROCEDURE — 84484 ASSAY OF TROPONIN QUANT: CPT | Performed by: PHYSICIAN ASSISTANT

## 2019-09-24 PROCEDURE — 96374 THER/PROPH/DIAG INJ IV PUSH: CPT

## 2019-09-24 RX ORDER — ALUMINA, MAGNESIA, AND SIMETHICONE 2400; 2400; 240 MG/30ML; MG/30ML; MG/30ML
15 SUSPENSION ORAL ONCE
Status: COMPLETED | OUTPATIENT
Start: 2019-09-24 | End: 2019-09-24

## 2019-09-24 RX ORDER — LIDOCAINE HYDROCHLORIDE 20 MG/ML
15 SOLUTION OROPHARYNGEAL ONCE
Status: COMPLETED | OUTPATIENT
Start: 2019-09-24 | End: 2019-09-24

## 2019-09-24 RX ORDER — FAMOTIDINE 10 MG/ML
20 INJECTION, SOLUTION INTRAVENOUS ONCE
Status: COMPLETED | OUTPATIENT
Start: 2019-09-24 | End: 2019-09-24

## 2019-09-24 RX ADMIN — ALUMINUM HYDROXIDE, MAGNESIUM HYDROXIDE, AND DIMETHICONE 15 ML: 400; 400; 40 SUSPENSION ORAL at 01:47

## 2019-09-24 RX ADMIN — IOPAMIDOL 95 ML: 755 INJECTION, SOLUTION INTRAVENOUS at 03:29

## 2019-09-24 RX ADMIN — LIDOCAINE HYDROCHLORIDE 15 ML: 20 SOLUTION ORAL; TOPICAL at 01:47

## 2019-09-24 RX ADMIN — FAMOTIDINE 20 MG: 10 INJECTION, SOLUTION INTRAVENOUS at 01:50

## 2019-09-24 NOTE — ED PROVIDER NOTES
"Pt is a 53 y.o. female who presents to the ED complaining of cp that started 2030 this evening. Pt described the pain as tense pressure and states that is waxes and wanes. Pt also complains of associated nausea. Pt denies BLE pain/edema, SOA and abd pain. Pt denies personal hx of coronary disease and states her father had MI in his 50s. Pt is a former smoker. Pt states cp was mildly worse by \"moving around.\" Pt denies recent immobilization. Pt states she recently had an upper respiratory infection which she used an albuterol inhaler for.        On exam,  Constitutional: awake, alert, oriented, NAD  HENT: unremarkable  Cardiovascular: RRR  Pulmonary: no respiratory distress, CTAB  Abdomen: benign   Musculoskeletal: no pedal edema or calf tenderness  Neurological: normal exam      EKG          EKG time: 0023  Rhythm/Rate: NSR 60 BPM  P waves and ME: normal  QRS, axis: normal   ST and T waves: normal     Interpreted Contemporaneously by me, independently viewed  unchanged compared to prior 11/16/18      Plan: CXR is negative acute. Will review lab work.       MD ATTESTATION NOTE    The DARREN and I have discussed this patient's history, physical exam, and treatment plan.  I have reviewed the documentation and personally had a face to face interaction with the patient. I affirm the documentation and agree with the treatment and plan.  The attached note describes my personal findings.      Documentation assistance provided by mirela Foster for MD Kenton. Information recorded by the scribe was done at my direction and has been verified and validated by me.             Sangeetha Foster  09/24/19 0117       Mick Farnsworth MD  09/24/19 [id number]    "

## 2019-09-24 NOTE — ED PROVIDER NOTES
" EMERGENCY DEPARTMENT ENCOUNTER    Room Number:  26/26  Date of encounter:  9/24/2019  PCP: Spenser Hodgson MD  Historian: patient,       HPI:  Chief Complaint: chest pain  Context: Aundrea Mcgarry is a 53 y.o. female who presents to the ED c/o lower CP, described as \"sharp and pressure-like\", onset around 2030 this evening. The began while the pt was sitting on the couch. It began in the epigastric abd area and lower chest and gradually radiated to the L chest. It is constant and is rated a 7/10. No aggravating or alleviating factors. The pt also notes one episode of L calf pain with a focal area of swelling to the L calf last week that has since resolved. Pt reports Hx of esophageal spasms, but reports that this pain feels  different. Denies acute SOA, cough, nausea, vomiting, and fever. No Hx of HTN and DM. Former smoker ( 5pack year) as of 2004. Confirms family Hx of heart disease, with her father's first MI at the age of around 54. The patient denies any trips or travel, immobilization, or surgeries/trauma in the last 4 weeks, as well as any exogenous estrogen use, hemoptysis, hx of VTE. There are no other complaints.     MEDICAL RECORD REVIEW  Pt was seen in the ED for chest pain on 11/16/18 and had a negative CT PE protocol. She then went to the chest pain evaluation center where she had a normal ECHO. She had a negative ER work-up with no further risk factors. No further work-up was pursued.    PAST MEDICAL HISTORY  Active Ambulatory Problems     Diagnosis Date Noted   • Abdominal pain 04/13/2016   • Arthritis 04/13/2016   • Bunion 04/13/2016   • Gastroesophageal reflux disease with esophagitis 04/13/2016   • Irregular menstrual cycle 04/13/2016   • Palpitations 04/13/2016   • Dyskinesia of esophagus 04/13/2016   • Ventricular premature beats 04/13/2016   • Recurrent herpes labialis 04/13/2016   • Hypothyroidism 04/13/2016   • Dizziness 04/13/2016   • Shoulder, capsulitis, adhesive 04/15/2016   • " Bilateral shoulder pain 04/18/2016   • Right facial numbness 04/20/2016   • Bursitis/tendonitis, shoulder 05/20/2016   • Annual physical exam 12/05/2016   • Depression with anxiety 09/07/2017   • Dysphagia 10/13/2017   • Osteopenia of both thighs 12/07/2017   • Hiatal hernia 12/08/2017   • OAB (overactive bladder) 09/04/2019     Resolved Ambulatory Problems     Diagnosis Date Noted   • No Resolved Ambulatory Problems     Past Medical History:   Diagnosis Date   • Arthritis    • Broken jaw (CMS/HCC)    • Esophageal spasm    • Gallbladder disorder    • GERD (gastroesophageal reflux disease)    • Hypothyroidism    • IBS (irritable bowel syndrome)    • Overactive bladder    • PONV (postoperative nausea and vomiting)    • PVC (premature ventricular contraction)          PAST SURGICAL HISTORY  Past Surgical History:   Procedure Laterality Date   • BREAST BIOPSY Right early 90's   • CHOLECYSTECTOMY WITH INTRAOPERATIVE CHOLANGIOGRAM N/A 6/30/2017    Procedure: CHOLECYSTECTOMY LAPAROSCOPIC INTRAOPERATIVE CHOLANGIOGRAM;  Surgeon: David Kirkland MD;  Location: [facility] OR [facility];  Service:    • COLONOSCOPY     • COLONOSCOPY  2013?    DR BRAR   • ENDOSCOPY N/A 12/1/2017    Procedure: ESOPHAGOGASTRODUODENOSCOPY with esophageal dilitation to 20mm,, and tissue biopsies/ polypectomies esophagus and gastric;  Surgeon: Meliza Pope MD;  Location: [facility] OR;  Service:    • EYE SURGERY Bilateral     LENS REPLACEMENT   • HIP SURGERY Left    • HYSTERECTOMY     • HYSTERECTOMY  2002   • INGUINAL HERNIA REPAIR Left    • MANDIBLE FRACTURE SURGERY     • TONSILLECTOMY     • UPPER GASTROINTESTINAL ENDOSCOPY N/A 07/20/2011    Normal upper endosopy - Dr. Meliza Pope         FAMILY HISTORY  Family History   Problem Relation Age of Onset   • Thyroid disease Mother    • Hypertension Mother    • Diabetes Father    • Hypertension Father    • Heart disease Father    • Breast cancer Maternal Aunt    • Breast cancer Paternal Aunt    • Heart disease  Brother    • Malig Hyperthermia Neg Hx          SOCIAL HISTORY  Social History     Socioeconomic History   • Marital status:      Spouse name: Not on file   • Number of children: Not on file   • Years of education: Not on file   • Highest education level: Not on file   Tobacco Use   • Smoking status: Former Smoker     Packs/day: 0.25     Years: 3.00     Pack years: 0.75     Types: Cigarettes     Last attempt to quit: 6/28/2004     Years since quitting: 15.2   • Smokeless tobacco: Never Used   Substance and Sexual Activity   • Alcohol use: Yes     Alcohol/week: 0.6 oz     Types: 1 Glasses of wine per week   • Drug use: No   • Sexual activity: Defer         ALLERGIES  Erythromycin and Adhesive tape        REVIEW OF SYSTEMS  Review of Systems   Constitutional: Negative for chills and fever.   HENT: Negative.    Eyes: Negative.    Respiratory: Negative for shortness of breath.    Cardiovascular: Positive for chest pain (lower) and leg swelling (L calf, resolved).   Gastrointestinal: Negative for abdominal pain.   Genitourinary: Negative.    Musculoskeletal: Negative.         (+) L calf pain, resolved   Skin: Negative.    Neurological: Negative.    Psychiatric/Behavioral: Negative.        PHYSICAL EXAM    I have reviewed the triage vital signs and nursing notes.    ED Triage Vitals   Temp Heart Rate Resp BP SpO2   09/23/19 2333 09/23/19 2234 09/23/19 2334 09/23/19 2234 09/23/19 2234   96.7 °F (35.9 °C) 62 18 140/68 100 %      Temp src Heart Rate Source Patient Position BP Location FiO2 (%)   09/23/19 2333 09/23/19 2234 -- -- --   Tympanic Monitor            Physical Exam  GENERAL: not distressed  HENT: nares patent, moist mucus membranes  EYES: no scleral icterus  CV: regular rhythm, regular rate, no MRG  RESPIRATORY: normal effort, CTAB  CHEST WALL: mild tenderness primarily over the sternum  ABDOMEN: soft, nontender, nondistended  MUSCULOSKELETAL: no deformity, no BLE edema, no calf tenderness  NEURO: alert,  moves all extremities, follows commands  SKIN: warm, dry        LAB RESULTS  Recent Results (from the past 24 hour(s))   Comprehensive Metabolic Panel    Collection Time: 09/24/19 12:27 AM   Result Value Ref Range    Glucose 88 65 - 99 mg/dL    BUN 10 6 - 20 mg/dL    Creatinine 0.69 0.57 - 1.00 mg/dL    Sodium 145 136 - 145 mmol/L    Potassium 4.0 3.5 - 5.2 mmol/L    Chloride 106 98 - 107 mmol/L    CO2 25.0 22.0 - 29.0 mmol/L    Calcium 8.8 8.6 - 10.5 mg/dL    Total Protein 6.7 6.0 - 8.5 g/dL    Albumin 4.50 3.50 - 5.20 g/dL    ALT (SGPT) 15 1 - 33 U/L    AST (SGOT) 19 1 - 32 U/L    Alkaline Phosphatase 96 39 - 117 U/L    Total Bilirubin 0.3 0.2 - 1.2 mg/dL    eGFR Non African Amer 89 >60 mL/min/1.73    Globulin 2.2 gm/dL    A/G Ratio 2.0 g/dL    BUN/Creatinine Ratio 14.5 7.0 - 25.0    Anion Gap 14.0 5.0 - 15.0 mmol/L   Troponin    Collection Time: 09/24/19 12:27 AM   Result Value Ref Range    Troponin T <0.010 0.000 - 0.030 ng/mL   Light Blue Top    Collection Time: 09/24/19 12:27 AM   Result Value Ref Range    Extra Tube hold for add-on    Green Top (Gel)    Collection Time: 09/24/19 12:27 AM   Result Value Ref Range    Extra Tube Hold for add-ons.    Lavender Top    Collection Time: 09/24/19 12:27 AM   Result Value Ref Range    Extra Tube hold for add-on    Gold Top - SST    Collection Time: 09/24/19 12:27 AM   Result Value Ref Range    Extra Tube Hold for add-ons.    CBC Auto Differential    Collection Time: 09/24/19 12:27 AM   Result Value Ref Range    WBC 7.31 3.40 - 10.80 10*3/mm3    RBC 3.81 3.77 - 5.28 10*6/mm3    Hemoglobin 12.5 12.0 - 15.9 g/dL    Hematocrit 38.4 34.0 - 46.6 %    .8 (H) 79.0 - 97.0 fL    MCH 32.8 26.6 - 33.0 pg    MCHC 32.6 31.5 - 35.7 g/dL    RDW 12.8 12.3 - 15.4 %    RDW-SD 47.4 37.0 - 54.0 fl    MPV 11.5 6.0 - 12.0 fL    Platelets 258 140 - 450 10*3/mm3    Neutrophil % 67.7 42.7 - 76.0 %    Lymphocyte % 20.4 19.6 - 45.3 %    Monocyte % 8.2 5.0 - 12.0 %    Eosinophil % 2.5 0.3 -  6.2 %    Basophil % 0.8 0.0 - 1.5 %    Immature Grans % 0.4 0.0 - 0.5 %    Neutrophils, Absolute 4.95 1.70 - 7.00 10*3/mm3    Lymphocytes, Absolute 1.49 0.70 - 3.10 10*3/mm3    Monocytes, Absolute 0.60 0.10 - 0.90 10*3/mm3    Eosinophils, Absolute 0.18 0.00 - 0.40 10*3/mm3    Basophils, Absolute 0.06 0.00 - 0.20 10*3/mm3    Immature Grans, Absolute 0.03 0.00 - 0.05 10*3/mm3    nRBC 0.0 0.0 - 0.2 /100 WBC   Lipase    Collection Time: 09/24/19 12:27 AM   Result Value Ref Range    Lipase 18 13 - 60 U/L   Troponin    Collection Time: 09/24/19  2:23 AM   Result Value Ref Range    Troponin T <0.010 0.000 - 0.030 ng/mL       Ordered the above labs and independently reviewed the results.        RADIOLOGY  Xr Chest 2 View    Result Date: 9/23/2019  PA AND LATERAL CHEST RADIOGRAPH  HISTORY: Chest pain  COMPARISON: 11/16/2018  FINDINGS: Cardiac silhouette is stable. No pneumothorax, pleural effusion, or acute infiltrate is seen.      No acute findings.  This report was finalized on 9/23/2019 11:54 PM by Dr. Yenifer Yan M.D.      Ct Angiogram Chest With Contrast    Result Date: 9/24/2019  CT ANGIOGRAM OF THE CHEST  HISTORY: Pleuritic chest pain and shortness of air  COMPARISON: 11/16/2018  TECHNIQUE: Axial CT imaging was obtained from the thoracic inlet through the diaphragm. IV contrast was administered. 3-D reformatted images were obtained.  FINDINGS: No acute pulmonary thromboembolus is seen. The thoracic aorta is normal in caliber, and there is no evidence of dissection. The thyroid gland, trachea, and esophagus appear unremarkable. Mediastinal lymph nodes do not appear pathologically enlarged. However, there is a prominent right hilar node, measuring 1.1 cm, which is larger than on prior exam. There is bibasilar scarring. There is an area of nodularity seen within the right lower lobe which appears more well-developed current exam. It measures about 8 mm in size. There is also a stable 4 mm nodule identified within  the right lower lobe. No focal infiltrates are seen. Images through the upper abdomen do not demonstrate any acute abnormalities. Tiny stable low-attenuation lesion is seen within the right hepatic lobe. No acute osseous abnormalities are seen.       1. No acute pulmonary thromboembolus seen. 2. No pulmonary infiltrates are seen. 3. There is an area of nodularity seen within the right lower lobe which appears more pronounced than on prior exam. This likely reflects an area of scarring, but short-term CT follow-up in 3 months is recommended. There is also a prominent right hilar node, and attention to it on subsequent follow-up exam is suggested.  Radiation dose reduction techniques were utilized, including automated exposure control and exposure modulation based on body size.  This report was finalized on 9/24/2019 3:48 AM by Dr. Yenifer Yan M.D.        I ordered the above noted radiological studies. Reviewed by me. See dictation for official radiology interpretation.      PROCEDURES    Procedures    EKG interpreted by ED physician. See their note for findings.     HEART SCORE    History Slightly or non-suspicious (0)  ECG Normal (0)  Age 46-65 (1)  Risk factors 1 or 2 (1)  Troponin < or = Normal limit (0)    This patient's HEART score is 2    HEART Score Key:  Scores 0-3: 0.9-1.7% risk of adverse cardiac event. In the HEART Score study, these patients were discharged (0.99% in the retrospective study, 1.7% in the prospective study)  Scores 4-6: 12-16.6% risk of adverse cardiac event. In the HEART Score study, these patients were admitted to the hospital. (11.6% retrospective, 16.6% prospective)  Scores ?7: 50-65% risk of adverse cardiac event. In the HEART Score study, these patients were candidates for early invasive measures. (65.2% retrospective, 50.1% prospective)      MEDICATIONS GIVEN IN ER    Medications   sodium chloride 0.9 % flush 10 mL (not administered)   aluminum-magnesium hydroxide-simethicone  (MAALOX MAX) 400-400-40 MG/5ML suspension 15 mL (15 mL Oral Given 9/24/19 0147)   Lidocaine Viscous HCl (XYLOCAINE) 2 % mouth solution 15 mL (15 mL Mouth/Throat Given 9/24/19 0147)   famotidine (PEPCID) injection 20 mg (20 mg Intravenous Given 9/24/19 0150)   iopamidol (ISOVUE-370) 76 % injection 100 mL (95 mL Intravenous Given by Other 9/24/19 0329)         PROGRESS, DATA ANALYSIS, CONSULTS, AND MEDICAL DECISION MAKING    All labs have been independently reviewed by me.  All radiology studies have been reviewed by me and discussed with radiologist dictating the report.   EKG's independently viewed and interpreted by me.  Discussion below represents my analysis of pertinent findings related to patient's condition, differential diagnosis, treatment plan and final disposition.         0117- Discussed pt with Dr. Farnsworth, who, after a beside evaluation of the pt, agrees with the course of care.     0143- Rechecked pt. Pt is resting comfortably. Notified pt of her EKG, CXR, and lab results. Discussed the plan to obtain repeat Troponin and to administer Pepcid and GI cocktail for pt pain. Pt understands and agrees with the plan, all questions answered.    0308- Rechecked pt. Pt is resting comfortably. Her pain is now improved, but she states that it is now sharp and pleuritic in the lower mid chest. Notified pt of her repeat Troponin, which is negative. Discussed the plan to obtain CTA chest. Pt understands and agrees with the plan, all questions answered.    0439- Rechecked pt. Pt is resting comfortably. Her pain is still improved. Notified pt of her CTA results. Discussed the plan to discharge the pt home. I instructed the pt to f/u with her PCP and Cardiology. RTED instructions given. Pt understands and agrees with the plan, all questions answered.  --  AS OF 4:47 AM VITALS:    BP - 129/67  HR - 58  TEMP - 96.7 °F (35.9 °C) (Tympanic)  02 SATS - 93%  --    DIAGNOSIS  Final diagnoses:   Chest pain, unspecified type    Upper abdominal pain         DISPOSITION  DISCHARGE    Patient discharged in stable condition.    Reviewed implications of results, diagnosis, meds, responsibility to follow up, warning signs and symptoms of possible worsening, potential complications and reasons to return to ER.    Patient/Family voiced understanding of above instructions.    Discussed plan for discharge, as there is no emergent indication for admission. Patient referred to primary care provider for BP management due to today's BP. Pt/family is agreeable and understands need for follow up and repeat testing.  Pt is aware that discharge does not mean that nothing is wrong but it indicates no emergency is present that requires admission and they must continue care with follow-up as given below or physician of their choice.     FOLLOW-UP  Spenser Hodgson MD  [address]  [place] [id number] [phone]    In 2 days      Silvia Beal MD  [address]  [facility] [id number] [phone]    Schedule an appointment as soon as possible for a visit            Medication List      No changes were made to your prescriptions during this visit.         --  Documentation assistance provided by mirela Nunez for Deborah Heath PA-C.  Information recorded by the mirela was done at my direction and has been verified and validated by me.     Cristiano Nunez  09/24/19 [id number]       Tiff Heath PA  09/24/19 [id number]

## 2019-09-24 NOTE — DISCHARGE INSTRUCTIONS
Take Pepcid twice daily.  Activities as tolerated.  Recheck with your primary care provider in 2 days and follow up with [facility], call today to schedule.  Return to the ER for increasing frequency or severity of chest pain, feeling lightheaded or passing out, shortness of breath, any concerns.    Need repeat chest CT in 3 months for comparison for right lower lobe nodularity.

## 2019-09-30 ENCOUNTER — OFFICE VISIT (OUTPATIENT)
Dept: CARDIOLOGY | Facility: CLINIC | Age: 54
End: 2019-09-30

## 2019-09-30 VITALS
HEART RATE: 55 BPM | BODY MASS INDEX: 35.61 KG/M2 | HEIGHT: 60 IN | DIASTOLIC BLOOD PRESSURE: 80 MMHG | WEIGHT: 181.4 LBS | SYSTOLIC BLOOD PRESSURE: 110 MMHG

## 2019-09-30 DIAGNOSIS — R53.83 FATIGUE, UNSPECIFIED TYPE: ICD-10-CM

## 2019-09-30 DIAGNOSIS — R07.9 CHEST PAIN, UNSPECIFIED TYPE: ICD-10-CM

## 2019-09-30 DIAGNOSIS — R00.2 PALPITATIONS: ICD-10-CM

## 2019-09-30 DIAGNOSIS — R29.818 SUSPECTED SLEEP APNEA: ICD-10-CM

## 2019-09-30 DIAGNOSIS — R06.09 DOE (DYSPNEA ON EXERTION): Primary | ICD-10-CM

## 2019-09-30 PROCEDURE — 99214 OFFICE O/P EST MOD 30 MIN: CPT | Performed by: NURSE PRACTITIONER

## 2019-09-30 NOTE — PROGRESS NOTES
Date of Office Visit: 2019  Encounter Provider: Abimbola Jordan, TONI, APRN  Place of Service: [facility] CARDIOLOGY  Patient Name: Aundrea Mcgarry  :1965        Subjective:     Chief Complaint:  Follow-up, history of chest pain and PVCs, ER follow-up.      History of Present Illness:  Aundrea Mcgarry is a 53 y.o. female patient of Dr. Beal.  This is my first time seeing this patient in the office today and I reviewed her records.    Patient has a history of abdominal pain and chest pain, GERD, esophageal spasms, PVCs, hypothyroidism, anxiety and depression, family history of premature coronary artery disease.    Patient was seen in cardiac evaluation unit by Dr. Beal 2018.  The previous night she developed severe abdominal pain in her epigastric area.  She went to bed and woke up and felt okay but then became diaphoretic and started having abdominal pain and was nauseous but did not throw up or have diarrhea.  The pain started to radiate up to her chest under her rib cage and under her left breast.  It briefly got better with 2 nitroglycerin in the ER but then came back.  She was not sure if pain was worse with eating.  She was not sure if it was worse with exertion.  She did have a history of PVCs for which she was on metoprolol.  She had a normal troponin in the ER.  D-dimer was elevated and she had CT PE protocol that showed no PE.  Dr. Beal reviewed patient's CT scan in the proximal portion of her coronary arteries were noted to look pretty normal though the distal vessels could not be seen.  Patient had echocardiogram done 2018 showing normal left ventricular systolic function with EF of 67%.  No significant valvular heart disease was seen.  Chest pain was not felt to be from cardiac origin.  She did have elevated alkaline phosphatase and liver function test and was instructed to follow-up with primary care on this.    Patient presented to the ER 2019  complaining of chest pain that started that evening.  It was described as pressure that would wax and wane.  She also complained of associated nausea.  She reported a family history of coronary artery disease with her father having an MI in his 50s.  She was noted to be a former smoker.  She thought that the chest pain may be mildly worse with moving around.  She reported a recent upper respiratory infection for which she was on albuterol.  Troponin was negative x 2.  CBC and CMP without significant findings.      Patient presents to office today for follow-up appointment.  Patient reports that prior to the ER visit she was cooking dinner with her  and had just sat down to eat when she developed severe left sternal chest pain radiating in all directions.  She reports that she has a history of GERD and esophageal spasms but this did not feel consistent with the esophageal spasm she has had in the past.  She tried to take some Gaviscon but symptoms did not improve.  She did not eat to see if symptoms improved or changed with eating.  She went to the ER and had evaluation as discussed above.  Patient reports she has been having chest pain on and off for the last few years and she has mostly noticed it at rest.  She does not recall having chest discomfort with activities.  She is not doing much exercise but occasionally does some yard work and has not noticed any chest discomfort with the yard work.  She has been under a lot of stress recently and has not noticed any chest discomfort with increased stressful situations.  Her chest discomfort symptoms prior to recent ER visit did occur after dropping of her granddaughter and then cooking dinner.  She has not had any recurrence of chest discomfort since.  She has noticed some occasional shortness of breath with exertion over the last couple of months such as with going up the stairs though she also has had recent URI.  She does have a family history of premature  CAD with father with heart attack in his 50s.  Unfortunately he had a history of COPD and heart failure and passed away 6 months ago after getting the flu.  This is part of what is contributing to her stress.  We will proceed with treadmill stress test.  She had an echocardiogram less than a year ago that was normal.  If treadmill stress test normal we discussed pursuing CT coronary artery calcium score.  If treadmill stress test normal would recommend getting into a light exercise routine and increasing gradually as tolerated.  Patient does have a history of palpitations that have been occurring occasionally for years.  She does report that prior to the ER visit they seem to increase in frequency slightly though she had just had her thyroid medication adjusted.  Now they are back to normal.  She is scheduled to get her TSH rechecked in 2 weeks and will keep this appointment.  She occasionally gets some lightheadedness with rapid position changes.  She is not sure if she snores but she has chronic fatigue and morning fatigue.  She denies any lower extremity edema, syncope, near syncope, falls, or abnormal bleeding.  Blood pressure and heart rate are well controlled in the office today.  She reports heart rate usually runs in the 50s at home and blood pressure usually runs low normal.          Past Medical History:   Diagnosis Date   • Arthritis    • Broken jaw (CMS/HCC)     1995     • Esophageal spasm    • Gallbladder disorder    • GERD (gastroesophageal reflux disease)    • Hypothyroidism    • IBS (irritable bowel syndrome)    • Overactive bladder    • PONV (postoperative nausea and vomiting)    • PVC (premature ventricular contraction)      Past Surgical History:   Procedure Laterality Date   • BREAST BIOPSY Right early 90's   • CHOLECYSTECTOMY WITH INTRAOPERATIVE CHOLANGIOGRAM N/A 6/30/2017    Procedure: CHOLECYSTECTOMY LAPAROSCOPIC INTRAOPERATIVE CHOLANGIOGRAM;  Surgeon: David Kirkland MD;  Location: [facility];  Service:    • COLONOSCOPY     • COLONOSCOPY  2013?    DR BRAR   • ENDOSCOPY N/A 12/1/2017    Procedure: ESOPHAGOGASTRODUODENOSCOPY with esophageal dilitation to 20mm,, and tissue biopsies/ polypectomies esophagus and gastric;  Surgeon: Meliza Pope MD;  Location: [facility];  Service:    • EYE SURGERY Bilateral     LENS REPLACEMENT   • HIP SURGERY Left    • HYSTERECTOMY     • HYSTERECTOMY  2002   • INGUINAL HERNIA REPAIR Left    • MANDIBLE FRACTURE SURGERY     • TONSILLECTOMY     • UPPER GASTROINTESTINAL ENDOSCOPY N/A 07/20/2011    Normal upper endosopy - Dr. Meliza Pope     Outpatient Medications Prior to Visit   Medication Sig Dispense Refill   • acyclovir (ZOVIRAX) 400 MG tablet TAKE ONE TABLET BY MOUTH DAILY (Patient taking differently: TAKE ONE TABLET BY MOUTH DAILY - currently taking 1/2 tab daily) 90 tablet 1   • albuterol sulfate  (90 Base) MCG/ACT inhaler Inhale 2 puffs Every 4 (Four) Hours As Needed for Wheezing. 1 inhaler 1   • benzonatate (TESSALON) 200 MG capsule Take 1 capsule by mouth 3 (Three) Times a Day As Needed for Cough. 30 capsule 0   • buPROPion XL (WELLBUTRIN XL) 150 MG 24 hr tablet Take 1 tablet by mouth Every Morning. 30 tablet 5   • dexlansoprazole (DEXILANT) 60 MG capsule Take 60 mg by mouth Daily.     • FLUoxetine (PROzac) 60 MG tablet TAKE ONE TABLET BY MOUTH DAILY 30 tablet 5   • levothyroxine (SYNTHROID, LEVOTHROID) 137 MCG tablet Take 1 tablet by mouth Daily. 30 tablet 2   • metoprolol tartrate (LOPRESSOR) 25 MG tablet TAKE ONE TABLET BY MOUTH TWICE A  tablet 0   • MULTIPLE VITAMIN PO Take 1 tablet by mouth Daily. PT HOLDING FOR SURGERY     • naproxen (NAPROSYN) 500 MG tablet Take 500 mg by mouth As Needed.     • oxybutynin XL (DITROPAN-XL) 5 MG 24 hr tablet TAKE ONE TABLET BY MOUTH DAILY 90 tablet 3   • ranitidine (ZANTAC) 150 MG tablet Take 150 mg by mouth As Needed for Heartburn or Indigestion.     • traZODone (DESYREL) 100 MG tablet TAKE ONE TABLET BY  "MOUTH EVERY NIGHT AT BEDTIME 90 tablet 1     No facility-administered medications prior to visit.        Allergies as of 09/30/2019 - Reviewed 09/30/2019   Allergen Reaction Noted   • Erythromycin Nausea And Vomiting 04/13/2016   • Adhesive tape Rash 04/13/2016     Social History     Socioeconomic History   • Marital status:      Spouse name: Not on file   • Number of children: Not on file   • Years of education: Not on file   • Highest education level: Not on file   Tobacco Use   • Smoking status: Former Smoker     Packs/day: 0.25     Years: 3.00     Pack years: 0.75     Types: Cigarettes     Last attempt to quit: 6/28/2004     Years since quitting: 15.2   • Smokeless tobacco: Never Used   Substance and Sexual Activity   • Alcohol use: Yes     Alcohol/week: 0.6 oz     Types: 1 Glasses of wine per week   • Drug use: No   • Sexual activity: Defer     Family History   Problem Relation Age of Onset   • Thyroid disease Mother    • Hypertension Mother    • Diabetes Father    • Hypertension Father    • Heart disease Father    • Breast cancer Maternal Aunt    • Breast cancer Paternal Aunt    • Heart disease Brother    • Malig Hyperthermia Neg Hx        Review of Systems   Constitution: Positive for malaise/fatigue. Negative for chills, fever, weight gain and weight loss.   HENT: Negative for ear pain, hearing loss, nosebleeds and sore throat.    Eyes: Negative for blurred vision, double vision, redness, vision loss in left eye and vision loss in right eye.   Cardiovascular:        SEE HPI.    Respiratory: Positive for cough and wheezing. Negative for shortness of breath and snoring.    Endocrine: Positive for heat intolerance. Negative for cold intolerance.   Skin: Negative for itching, rash and suspicious lesions.   Musculoskeletal: Positive for joint pain, joint swelling and myalgias.   Gastrointestinal: Positive for abdominal pain (occasonal), diarrhea (occasional ) and heartburn (\"GERD\" ). Negative for " "hematemesis, melena, nausea and vomiting.   Genitourinary: Negative for dysuria, frequency and hematuria.   Neurological: Positive for headaches and light-headedness. Negative for dizziness, numbness, paresthesias and seizures.   Psychiatric/Behavioral: Positive for depression. Negative for altered mental status. The patient is nervous/anxious.           Objective:     Vitals:    09/30/19 0915   BP: 110/80   BP Location: Left arm   Pulse: 55   Weight: 82.3 kg (181 lb 6.4 oz)   Height: 152.4 cm (60\")     Body mass index is 35.43 kg/m².      PHYSICAL EXAM:  Physical Exam   Constitutional: She is oriented to person, place, and time. She appears well-developed and well-nourished. No distress.   Obese   HENT:   Head: Normocephalic and atraumatic.   Eyes: Pupils are equal, round, and reactive to light.   Neck: Neck supple. No JVD present.   Cardiovascular: Normal rate, regular rhythm, normal heart sounds and intact distal pulses. Exam reveals no gallop and no friction rub.   No murmur heard.  Pulses:       Radial pulses are 2+ on the right side, and 2+ on the left side.        Posterior tibial pulses are 2+ on the right side, and 2+ on the left side.   Pulmonary/Chest: Effort normal and breath sounds normal. No respiratory distress. She has no wheezes. She has no rales.   Abdominal: Soft. Bowel sounds are normal. She exhibits no distension. There is no tenderness.   Musculoskeletal: She exhibits no edema or tenderness.   Neurological: She is alert and oriented to person, place, and time.   Skin: Skin is warm and dry. No rash noted. She is not diaphoretic. No erythema.   Psychiatric: She has a normal mood and affect. Her behavior is normal. Judgment normal.         Procedures  SEE EKG FROM 9/24/19.         Assessment:       Diagnosis Plan   1. CALDERON (dyspnea on exertion)  Treadmill Stress Test   2. Chest pain, unspecified type  Treadmill Stress Test   3. Suspected sleep apnea  Home Sleep Study   4. Fatigue, unspecified type  " Home Sleep Study   5. Palpitations           Plan:     1. History of chest pain: With mostly atypical features present.  Patient reports she has been having chest pain on and off for the last few years and she has mostly noticed it at rest.  She is not doing much exercise but occasionally does some yard work and has not noticed any chest discomfort with the yard work.  She has been under a lot of stress recently and has not noticed any chest discomfort with stressful situations.  Her chest discomfort symptoms prior to recent ER visit did occur after cooking dinner.  She reported that it did not feel typical for her esophageal spasm and did not improved with Gaviscon.  She has not had any recurrence of chest discomfort since.  She has noticed some occasional shortness of breath with exertion over the last couple of months with going up the stairs though she also has had recent URI.  She does have a family history of premature CAD with father with heart attack in his 50s.  We will proceed with treadmill stress test.  She had an echocardiogram less than a year ago that was normal.  If treadmill stress test normal we discussed pursuing CT coronary artery calcium score.  If treadmill stress test normal would recommend getting into a light exercise routine and increasing gradually as tolerated.  2. Suspected sleep apnea: Patient is not sure if she snores however she does have morning fatigue and chronic fatigue.  Her father had sleep apnea.  3. History of symptomatic PVCs: Remains on beta-blocker therapy with metoprolol.  4. Recent URI  5. Hypothyroidism: Managed by outside provider.  Scheduled for repeat TSH through primary care in 2 weeks.  She will keep this appointment.  6. Hx GERD and esophageal spasms  7. Family history early coronary artery disease    Patient to schedule six-month follow-up appointment with Dr. Beal or follow-up sooner if needed for any new, recurrent, or worsening symptoms or other  problems/concerns.           Your medication list           Accurate as of 9/30/19 10:11 AM. If you have any questions, ask your nurse or doctor.               CHANGE how you take these medications      Instructions Last Dose Given Next Dose Due   acyclovir 400 MG tablet  Commonly known as:  ZOVIRAX  What changed:    · how much to take  · how to take this  · when to take this      TAKE ONE TABLET BY MOUTH DAILY          CONTINUE taking these medications      Instructions Last Dose Given Next Dose Due   albuterol sulfate  (90 Base) MCG/ACT inhaler  Commonly known as:  PROVENTIL HFA;VENTOLIN HFA;PROAIR HFA      Inhale 2 puffs Every 4 (Four) Hours As Needed for Wheezing.       benzonatate 200 MG capsule  Commonly known as:  TESSALON      Take 1 capsule by mouth 3 (Three) Times a Day As Needed for Cough.       buPROPion  MG 24 hr tablet  Commonly known as:  WELLBUTRIN XL      Take 1 tablet by mouth Every Morning.       dexlansoprazole 60 MG capsule  Commonly known as:  DEXILANT      Take 60 mg by mouth Daily.       FLUoxetine 60 MG tablet  Commonly known as:  PROzac      TAKE ONE TABLET BY MOUTH DAILY       levothyroxine 137 MCG tablet  Commonly known as:  SYNTHROID, LEVOTHROID      Take 1 tablet by mouth Daily.       metoprolol tartrate 25 MG tablet  Commonly known as:  LOPRESSOR      TAKE ONE TABLET BY MOUTH TWICE A DAY       MULTIPLE VITAMIN PO      Take 1 tablet by mouth Daily. PT HOLDING FOR SURGERY       naproxen 500 MG tablet  Commonly known as:  NAPROSYN      Take 500 mg by mouth As Needed.       oxybutynin XL 5 MG 24 hr tablet  Commonly known as:  DITROPAN-XL      TAKE ONE TABLET BY MOUTH DAILY       raNITIdine 150 MG tablet  Commonly known as:  ZANTAC      Take 150 mg by mouth As Needed for Heartburn or Indigestion.       traZODone 100 MG tablet  Commonly known as:  DESYREL      TAKE ONE TABLET BY MOUTH EVERY NIGHT AT BEDTIME              I did not stop or change the above medications.  Patient's  medication list was updated to reflect medications they are currently taking including medication changes made by other providers.        Thanks,    Abimbola Jordan, DNP, APRN  09/30/2019         Dictated utilizing Dragon dictation

## 2019-10-08 LAB
T4 FREE SERPL-MCNC: 1.62 NG/DL (ref 0.82–1.77)
TSH SERPL DL<=0.005 MIU/L-ACNC: 1.2 UIU/ML (ref 0.45–4.5)

## 2019-10-14 ENCOUNTER — OFFICE VISIT (OUTPATIENT)
Dept: INTERNAL MEDICINE | Facility: CLINIC | Age: 54
End: 2019-10-14

## 2019-10-14 VITALS
DIASTOLIC BLOOD PRESSURE: 68 MMHG | SYSTOLIC BLOOD PRESSURE: 108 MMHG | BODY MASS INDEX: 35.42 KG/M2 | WEIGHT: 180.4 LBS | RESPIRATION RATE: 16 BRPM | HEIGHT: 60 IN | TEMPERATURE: 98.1 F | OXYGEN SATURATION: 98 % | HEART RATE: 58 BPM

## 2019-10-14 DIAGNOSIS — Z00.00 ROUTINE HEALTH MAINTENANCE: ICD-10-CM

## 2019-10-14 DIAGNOSIS — F41.8 DEPRESSION WITH ANXIETY: ICD-10-CM

## 2019-10-14 DIAGNOSIS — R07.9 CHEST PAIN, UNSPECIFIED TYPE: ICD-10-CM

## 2019-10-14 DIAGNOSIS — E03.9 HYPOTHYROIDISM, UNSPECIFIED TYPE: Primary | ICD-10-CM

## 2019-10-14 DIAGNOSIS — R91.1 PULMONARY NODULE, RIGHT: ICD-10-CM

## 2019-10-14 PROCEDURE — 99214 OFFICE O/P EST MOD 30 MIN: CPT | Performed by: FAMILY MEDICINE

## 2019-10-14 NOTE — PROGRESS NOTES
Aundrea Mcgarry is a 53 y.o. female, who presents with a chief complaint of   Chief Complaint   Patient presents with   • Follow-up     6 x week f/u, lab results   • Hypothyroidism   • Chest Pain     ED recent, EKG done, stress test scheduled       HPI     1. Hypothyroidism.  Pt here for 6 week follow-up.  TSH was found to be elevated to 20 and we increased levothyroxine from 112 mcg to 137 mcg.  Pt reports she is feeling better.    2. Depression with anxiety.  Added bupropion  mg to the fluoxetine 60 mg 6 weeks ago.  She reports she is feeling better.  Denies SI.    3. Chest pain.  She was seen in the ER about 3 weeks ago.  Workup was negative except a RLL nodule was found on chest CT; radiologist recommended 3 month f/u CT.  She has seen seen cardiology.  She's had no more episodes of pain.    The following portions of the patient's history were reviewed and updated as appropriate: allergies, current medications, past family history, past medical history, past social history, past surgical history and problem list.    Allergies: Erythromycin and Adhesive tape    Review of Systems   Constitutional: Negative.    HENT: Negative.    Eyes: Negative.    Respiratory: Negative.    Cardiovascular: Negative.    Gastrointestinal: Negative.    Endocrine: Negative.    Genitourinary: Negative.    Musculoskeletal: Positive for arthralgias.   Skin: Negative.    Allergic/Immunologic: Negative.    Neurological: Negative.    Hematological: Negative.              Wt Readings from Last 3 Encounters:   10/14/19 81.8 kg (180 lb 6.4 oz)   09/30/19 82.3 kg (181 lb 6.4 oz)   09/24/19 81.6 kg (180 lb)     Temp Readings from Last 3 Encounters:   10/14/19 98.1 °F (36.7 °C) (Oral)   09/23/19 96.7 °F (35.9 °C) (Tympanic)   09/04/19 98.2 °F (36.8 °C) (Oral)     BP Readings from Last 3 Encounters:   10/14/19 108/68   09/30/19 110/80   09/24/19 113/81     Pulse Readings from Last 3 Encounters:   10/14/19 58   09/30/19 55   09/24/19 59      Body mass index is 35.23 kg/m².  @LASTSAO2(3)@    Physical Exam   Constitutional: She is oriented to person, place, and time. She appears well-developed and well-nourished.   HENT:   Head: Normocephalic.   Mouth/Throat: Oropharynx is clear and moist.   Eyes: Conjunctivae and EOM are normal.   Neck: Normal range of motion. Neck supple.   Cardiovascular: Normal rate, regular rhythm and normal heart sounds.   Pulmonary/Chest: Effort normal and breath sounds normal.   Abdominal: There is no hepatosplenomegaly.   Musculoskeletal: Normal range of motion. She exhibits no edema.   Neurological: She is alert and oriented to person, place, and time.   Skin: Skin is warm and dry.   Psychiatric: She has a normal mood and affect. Her behavior is normal.   Nursing note and vitals reviewed.            Aundrea was seen today for follow-up, hypothyroidism and chest pain.    Diagnoses and all orders for this visit:    Hypothyroidism, unspecified type    Depression with anxiety    Chest pain, unspecified type    Pulmonary nodule, right  -     CT Chest With Contrast; Future    Routine health maintenance  -     Mammo Screening Bilateral With CAD; Future      1. Hypothyroidism.  Adequate replacement after recent increase in dose.    2. Depression with anxiety.  Improved.  Continue fluoxetine and bupropion XL.    3. Chest pain.  Resolved.  ER and cardiology notes reviewed.    4. Pulmonary nodularity.  Radiologist recommended 3 month f/u CT chest.    Outpatient Medications Prior to Visit   Medication Sig Dispense Refill   • acyclovir (ZOVIRAX) 400 MG tablet TAKE ONE TABLET BY MOUTH DAILY (Patient taking differently: TAKE ONE TABLET BY MOUTH DAILY - currently taking 1/2 tab daily) 90 tablet 1   • albuterol sulfate  (90 Base) MCG/ACT inhaler Inhale 2 puffs Every 4 (Four) Hours As Needed for Wheezing. 1 inhaler 1   • benzonatate (TESSALON) 200 MG capsule Take 1 capsule by mouth 3 (Three) Times a Day As Needed for Cough. 30 capsule 0    • buPROPion XL (WELLBUTRIN XL) 150 MG 24 hr tablet Take 1 tablet by mouth Every Morning. 30 tablet 5   • dexlansoprazole (DEXILANT) 60 MG capsule Take 60 mg by mouth Daily.     • FLUoxetine (PROzac) 60 MG tablet TAKE ONE TABLET BY MOUTH DAILY 30 tablet 5   • levothyroxine (SYNTHROID, LEVOTHROID) 137 MCG tablet Take 1 tablet by mouth Daily. 30 tablet 2   • metoprolol tartrate (LOPRESSOR) 25 MG tablet TAKE ONE TABLET BY MOUTH TWICE A  tablet 0   • MULTIPLE VITAMIN PO Take 1 tablet by mouth Daily. PT HOLDING FOR SURGERY     • naproxen (NAPROSYN) 500 MG tablet Take 500 mg by mouth As Needed.     • oxybutynin XL (DITROPAN-XL) 5 MG 24 hr tablet TAKE ONE TABLET BY MOUTH DAILY 90 tablet 3   • ranitidine (ZANTAC) 150 MG tablet Take 150 mg by mouth As Needed for Heartburn or Indigestion.     • traZODone (DESYREL) 100 MG tablet TAKE ONE TABLET BY MOUTH EVERY NIGHT AT BEDTIME 90 tablet 1     No facility-administered medications prior to visit.      No orders of the defined types were placed in this encounter.    [unfilled]  There are no discontinued medications.      Return in about 3 months (around 1/14/2020).

## 2019-10-21 RX ORDER — TRAZODONE HYDROCHLORIDE 100 MG/1
TABLET ORAL
Qty: 30 TABLET | Refills: 0 | Status: SHIPPED | OUTPATIENT
Start: 2019-10-21 | End: 2019-11-30 | Stop reason: SDUPTHER

## 2019-10-28 DIAGNOSIS — F41.8 DEPRESSION WITH ANXIETY: ICD-10-CM

## 2019-10-28 RX ORDER — BUPROPION HYDROCHLORIDE 300 MG/1
300 TABLET ORAL EVERY MORNING
Qty: 30 TABLET | Refills: 5 | Status: SHIPPED | OUTPATIENT
Start: 2019-10-28 | End: 2020-06-17

## 2019-11-07 ENCOUNTER — TELEPHONE (OUTPATIENT)
Dept: INTERNAL MEDICINE | Facility: CLINIC | Age: 54
End: 2019-11-07

## 2019-11-07 NOTE — TELEPHONE ENCOUNTER
I spoke with patient to see how she is doing on the increased Wellbutrin.  She states that she is feeling much better.     ----- Message from Spenser Hodgson MD sent at 10/28/2019  4:32 PM EDT -----  It's the Wellbutrin we had room to increase.  I increased this from 150 mg to 300 mg.  ----- Message -----  From: Nela Hendrickson  Sent: 10/25/2019  11:58 AM  To: Spenser Hodgson MD    Pt wants her Prozac increased. She is on 60mg and said you had talked about increaseing it. She said that she needs it. thanks

## 2019-11-11 ENCOUNTER — HOSPITAL ENCOUNTER (OUTPATIENT)
Dept: MAMMOGRAPHY | Facility: HOSPITAL | Age: 54
Discharge: HOME | End: 2019-11-11
Admitting: FAMILY MEDICINE

## 2019-11-11 DIAGNOSIS — Z00.00 ROUTINE HEALTH MAINTENANCE: ICD-10-CM

## 2019-11-11 PROCEDURE — 77063 BREAST TOMOSYNTHESIS BI: CPT

## 2019-11-11 PROCEDURE — 77067 SCR MAMMO BI INCL CAD: CPT

## 2019-11-26 DIAGNOSIS — E03.9 HYPOTHYROIDISM, UNSPECIFIED TYPE: ICD-10-CM

## 2019-11-26 RX ORDER — LEVOTHYROXINE SODIUM 137 UG/1
TABLET ORAL
Qty: 30 TABLET | Refills: 1 | Status: SHIPPED | OUTPATIENT
Start: 2019-11-26 | End: 2020-01-29

## 2019-12-02 RX ORDER — TRAZODONE HYDROCHLORIDE 100 MG/1
TABLET ORAL
Qty: 30 TABLET | Refills: 0 | Status: SHIPPED | OUTPATIENT
Start: 2019-12-02 | End: 2020-01-13

## 2019-12-11 ENCOUNTER — HOSPITAL ENCOUNTER (OUTPATIENT)
Dept: CT IMAGING | Facility: HOSPITAL | Age: 54
Discharge: HOME | End: 2019-12-11
Admitting: FAMILY MEDICINE

## 2019-12-11 ENCOUNTER — TELEPHONE (OUTPATIENT)
Dept: INTERNAL MEDICINE | Facility: CLINIC | Age: 54
End: 2019-12-11

## 2019-12-11 DIAGNOSIS — R91.1 PULMONARY NODULE, RIGHT: ICD-10-CM

## 2019-12-11 PROCEDURE — 71260 CT THORAX DX C+: CPT

## 2019-12-11 PROCEDURE — 0 IOPAMIDOL PER 1 ML: Performed by: FAMILY MEDICINE

## 2019-12-11 RX ADMIN — IOPAMIDOL 100 ML: 755 INJECTION, SOLUTION INTRAVENOUS at 08:50

## 2019-12-11 NOTE — TELEPHONE ENCOUNTER
----- Message from Spenser Hodgson MD sent at 12/11/2019  1:11 PM EST -----  Long nodules look stable/ benign.  Radiologist recommends repeat CT scan in 12 months.

## 2020-01-13 RX ORDER — TRAZODONE HYDROCHLORIDE 100 MG/1
TABLET ORAL
Qty: 30 TABLET | Refills: 0 | Status: SHIPPED | OUTPATIENT
Start: 2020-01-13 | End: 2020-02-18

## 2020-01-29 DIAGNOSIS — E03.9 HYPOTHYROIDISM, UNSPECIFIED TYPE: ICD-10-CM

## 2020-01-29 RX ORDER — LEVOTHYROXINE SODIUM 137 UG/1
TABLET ORAL
Qty: 30 TABLET | Refills: 0 | Status: SHIPPED | OUTPATIENT
Start: 2020-01-29 | End: 2020-03-02

## 2020-01-29 RX ORDER — OXYBUTYNIN CHLORIDE 5 MG/1
TABLET, EXTENDED RELEASE ORAL
Qty: 30 TABLET | Refills: 2 | Status: SHIPPED | OUTPATIENT
Start: 2020-01-29 | End: 2020-04-29

## 2020-02-10 RX ORDER — ACYCLOVIR 400 MG/1
TABLET ORAL
Qty: 30 TABLET | Refills: 0 | Status: SHIPPED | OUTPATIENT
Start: 2020-02-10 | End: 2020-03-12

## 2020-02-18 RX ORDER — TRAZODONE HYDROCHLORIDE 100 MG/1
TABLET ORAL
Qty: 30 TABLET | Refills: 0 | Status: SHIPPED | OUTPATIENT
Start: 2020-02-18 | End: 2020-04-03

## 2020-02-28 DIAGNOSIS — E03.9 HYPOTHYROIDISM, UNSPECIFIED TYPE: ICD-10-CM

## 2020-03-02 RX ORDER — LEVOTHYROXINE SODIUM 137 UG/1
TABLET ORAL
Qty: 30 TABLET | Refills: 0 | Status: SHIPPED | OUTPATIENT
Start: 2020-03-02 | End: 2020-04-01

## 2020-03-12 RX ORDER — ACYCLOVIR 400 MG/1
TABLET ORAL
Qty: 30 TABLET | Refills: 0 | Status: SHIPPED | OUTPATIENT
Start: 2020-03-12 | End: 2020-04-10

## 2020-03-14 DIAGNOSIS — F32.A DEPRESSION, UNSPECIFIED DEPRESSION TYPE: ICD-10-CM

## 2020-03-16 RX ORDER — FLUOXETINE HYDROCHLORIDE 60 MG/1
TABLET, FILM COATED ORAL; ORAL
Qty: 30 TABLET | Refills: 4 | Status: SHIPPED | OUTPATIENT
Start: 2020-03-16 | End: 2020-07-01

## 2020-04-01 DIAGNOSIS — E03.9 HYPOTHYROIDISM, UNSPECIFIED TYPE: ICD-10-CM

## 2020-04-01 RX ORDER — LEVOTHYROXINE SODIUM 137 UG/1
TABLET ORAL
Qty: 30 TABLET | Refills: 0 | Status: SHIPPED | OUTPATIENT
Start: 2020-04-01 | End: 2020-05-04

## 2020-04-03 RX ORDER — TRAZODONE HYDROCHLORIDE 100 MG/1
TABLET ORAL
Qty: 90 TABLET | Refills: 0 | Status: SHIPPED | OUTPATIENT
Start: 2020-04-03 | End: 2020-10-05

## 2020-04-10 RX ORDER — ACYCLOVIR 400 MG/1
TABLET ORAL
Qty: 30 TABLET | Refills: 0 | Status: SHIPPED | OUTPATIENT
Start: 2020-04-10 | End: 2020-05-11

## 2020-04-29 RX ORDER — OXYBUTYNIN CHLORIDE 5 MG/1
TABLET, EXTENDED RELEASE ORAL
Qty: 90 TABLET | Refills: 1 | Status: SHIPPED | OUTPATIENT
Start: 2020-04-29 | End: 2020-11-04

## 2020-05-04 DIAGNOSIS — E03.9 HYPOTHYROIDISM, UNSPECIFIED TYPE: ICD-10-CM

## 2020-05-04 RX ORDER — LEVOTHYROXINE SODIUM 137 UG/1
TABLET ORAL
Qty: 30 TABLET | Refills: 5 | Status: SHIPPED | OUTPATIENT
Start: 2020-05-04 | End: 2020-11-04

## 2020-05-11 RX ORDER — ACYCLOVIR 400 MG/1
TABLET ORAL
Qty: 30 TABLET | Refills: 0 | Status: SHIPPED | OUTPATIENT
Start: 2020-05-11 | End: 2020-06-17

## 2020-06-15 DIAGNOSIS — F41.8 DEPRESSION WITH ANXIETY: ICD-10-CM

## 2020-06-17 RX ORDER — ACYCLOVIR 400 MG/1
TABLET ORAL
Qty: 30 TABLET | Refills: 5 | Status: SHIPPED | OUTPATIENT
Start: 2020-06-17 | End: 2020-12-11 | Stop reason: SDUPTHER

## 2020-06-17 RX ORDER — BUPROPION HYDROCHLORIDE 300 MG/1
TABLET ORAL
Qty: 30 TABLET | Refills: 5 | Status: SHIPPED | OUTPATIENT
Start: 2020-06-17 | End: 2020-12-11 | Stop reason: SDUPTHER

## 2020-07-01 ENCOUNTER — OFFICE VISIT (OUTPATIENT)
Dept: INTERNAL MEDICINE | Facility: CLINIC | Age: 55
End: 2020-07-01

## 2020-07-01 VITALS
SYSTOLIC BLOOD PRESSURE: 126 MMHG | RESPIRATION RATE: 16 BRPM | TEMPERATURE: 98.2 F | OXYGEN SATURATION: 96 % | HEIGHT: 60 IN | HEART RATE: 60 BPM | WEIGHT: 179.4 LBS | DIASTOLIC BLOOD PRESSURE: 70 MMHG | BODY MASS INDEX: 35.22 KG/M2

## 2020-07-01 DIAGNOSIS — E03.9 HYPOTHYROIDISM, UNSPECIFIED TYPE: Primary | ICD-10-CM

## 2020-07-01 DIAGNOSIS — R91.1 PULMONARY NODULE, RIGHT: ICD-10-CM

## 2020-07-01 DIAGNOSIS — F41.8 DEPRESSION WITH ANXIETY: ICD-10-CM

## 2020-07-01 DIAGNOSIS — H91.93 BILATERAL HEARING LOSS, UNSPECIFIED HEARING LOSS TYPE: ICD-10-CM

## 2020-07-01 DIAGNOSIS — K42.9 UMBILICAL HERNIA WITHOUT OBSTRUCTION AND WITHOUT GANGRENE: ICD-10-CM

## 2020-07-01 DIAGNOSIS — Z00.00 ROUTINE HEALTH MAINTENANCE: ICD-10-CM

## 2020-07-01 PROCEDURE — 99214 OFFICE O/P EST MOD 30 MIN: CPT | Performed by: FAMILY MEDICINE

## 2020-07-01 RX ORDER — ESCITALOPRAM OXALATE 10 MG/1
10 TABLET ORAL DAILY
Qty: 30 TABLET | Refills: 5 | Status: SHIPPED | OUTPATIENT
Start: 2020-07-01 | End: 2021-02-18

## 2020-07-01 NOTE — PROGRESS NOTES
"      Aundrea Mcgarry is a 54 y.o. female, who presents with a chief complaint of   Chief Complaint   Patient presents with   • Anxiety   • Depression   • Hypothyroidism   • Hernia       Anxiety       Her past medical history is significant for depression.   Depression   Hypothyroidism   Associated symptoms include arthralgias.   Hernia   Associated symptoms include arthralgias.        1. Hypothyroidism.  She had a dose increase last year from 112 mcg to 137 mcg.  She felt better and f/u thyroid levels were in normal range.    2. Depression with anxiety.  She takes bupropion  mg and fluoxetine 60 mg daily.  Over the past several weeks she has been experiencing periods of \"ups\" and \"downs\" which last 2-3 days each.  When she feels \"up\" she feels more energized and creative and has a positive mood.  Denies staying up all night, spending money, pressured speech, etc.  During the \"down\" periods she would prefer to stay in bed and feels down and cries more often.  Denies SI.    3. Umbilical hernia.  She has had this for a few years but it hasn't bothered her until the past few weeks.  She thinks it may have started after her gallbladder surgery 3 years ago.  She c/o soreness and tenderness, especially when she is more active, with gardening.  It swells and the pain radiates to the side.    4. Hearing loss.  She has noticed decreased hearing over the past year.  She thinks the left side is worse.  She says she loses her balance occasionally.  She would like to see Dr. Castaneda, ENT.    The following portions of the patient's history were reviewed and updated as appropriate: allergies, current medications, past family history, past medical history, past social history, past surgical history and problem list.    Allergies: Erythromycin and Adhesive tape    Review of Systems   Constitutional: Negative.    HENT: Positive for hearing loss.    Eyes: Negative.    Respiratory: Negative.    Cardiovascular: Negative.  "   Endocrine: Negative.    Genitourinary: Negative.    Musculoskeletal: Positive for arthralgias.   Skin: Negative.    Allergic/Immunologic: Negative.    Neurological: Negative.    Hematological: Negative.              Wt Readings from Last 3 Encounters:   07/01/20 81.4 kg (179 lb 6.4 oz)   10/14/19 81.8 kg (180 lb 6.4 oz)   09/30/19 82.3 kg (181 lb 6.4 oz)     Temp Readings from Last 3 Encounters:   07/01/20 98.2 °F (36.8 °C) (Oral)   10/14/19 98.1 °F (36.7 °C) (Oral)   09/23/19 96.7 °F (35.9 °C) (Tympanic)     BP Readings from Last 3 Encounters:   07/01/20 126/70   10/14/19 108/68   09/30/19 110/80     Pulse Readings from Last 3 Encounters:   07/01/20 60   10/14/19 58   09/30/19 55     Body mass index is 35.04 kg/m².  @LASTSAO2(3)@    Physical Exam   Constitutional: She is oriented to person, place, and time. She appears well-developed and well-nourished.   HENT:   Head: Normocephalic.   Mouth/Throat: Oropharynx is clear and moist.   Eyes: Conjunctivae and EOM are normal.   Neck: Normal range of motion. Neck supple.   Cardiovascular: Normal rate, regular rhythm and normal heart sounds.   Pulmonary/Chest: Effort normal and breath sounds normal.   Abdominal: There is no hepatosplenomegaly. A hernia (3 cm umbilical, reducible.) is present.   Musculoskeletal: Normal range of motion. She exhibits no edema.   Neurological: She is alert and oriented to person, place, and time.   Skin: Skin is warm and dry.   Psychiatric: She has a normal mood and affect. Her behavior is normal.   Nursing note and vitals reviewed.            Aundrea was seen today for anxiety, depression, hypothyroidism and hernia.    Diagnoses and all orders for this visit:    Hypothyroidism, unspecified type    Depression with anxiety  -     escitalopram (LEXAPRO) 10 MG tablet; Take 1 tablet by mouth Daily.    Bilateral hearing loss, unspecified hearing loss type  -     Ambulatory Referral to ENT (Otolaryngology)    Pulmonary nodule, right    Umbilical  "hernia without obstruction and without gangrene  -     Ambulatory Referral to General Surgery    Routine health maintenance  -     CBC & Differential; Future  -     Comprehensive Metabolic Panel; Future  -     TSH; Future  -     Vitamin B12; Future  -     Vitamin D 25 Hydroxy; Future  -     C-reactive Protein; Future  -     Sedimentation Rate; Future  -     Lipid Panel With / Chol / HDL Ratio; Future    1. Hypothyroidism.  Adequate replacement.    2. Depression with anxiety.  Not adequately controlled.  I don't think these \"up\" days are hloley.  I think she is having good and bad days with uncontrolled depression.  Continue bupropion XL.  Wean off fluoxetine and start escitalopram.    3. Hearing loss, subjective with disequilibrium.  To ENT.    4. Pulmonary nodularity.  Radiologist recommended repeat CT chest in 1 year (12/2020) to include prone lung base images.    5. Umbilical hernia.  Refer back to Dr. Kirkland.    Outpatient Medications Prior to Visit   Medication Sig Dispense Refill   • acyclovir (ZOVIRAX) 400 MG tablet TAKE ONE TABLET BY MOUTH DAILY 30 tablet 5   • buPROPion XL (WELLBUTRIN XL) 300 MG 24 hr tablet TAKE ONE TABLET BY MOUTH EVERY MORNING 30 tablet 5   • dexlansoprazole (DEXILANT) 60 MG capsule Take 60 mg by mouth Daily.     • levothyroxine (SYNTHROID, LEVOTHROID) 137 MCG tablet TAKE ONE TABLET BY MOUTH DAILY 30 tablet 5   • metoprolol tartrate (LOPRESSOR) 25 MG tablet TAKE ONE TABLET BY MOUTH TWICE A  tablet 0   • MULTIPLE VITAMIN PO Take 1 tablet by mouth Daily. PT HOLDING FOR SURGERY     • naproxen (NAPROSYN) 500 MG tablet Take 500 mg by mouth As Needed.     • oxybutynin XL (DITROPAN-XL) 5 MG 24 hr tablet TAKE ONE TABLET BY MOUTH DAILY 90 tablet 1   • ranitidine (ZANTAC) 150 MG tablet Take 150 mg by mouth As Needed for Heartburn or Indigestion.     • traZODone (DESYREL) 100 MG tablet TAKE ONE TABLET BY MOUTH AT BEDTIME 90 tablet 0   • FLUoxetine (PROzac) 60 MG tablet TAKE ONE TABLET BY " MOUTH DAILY 30 tablet 4   • albuterol sulfate  (90 Base) MCG/ACT inhaler Inhale 2 puffs Every 4 (Four) Hours As Needed for Wheezing. 1 inhaler 1   • benzonatate (TESSALON) 200 MG capsule Take 1 capsule by mouth 3 (Three) Times a Day As Needed for Cough. 30 capsule 0     No facility-administered medications prior to visit.      New Medications Ordered This Visit   Medications   • escitalopram (LEXAPRO) 10 MG tablet     Sig: Take 1 tablet by mouth Daily.     Dispense:  30 tablet     Refill:  5     [unfilled]  Medications Discontinued During This Encounter   Medication Reason   • benzonatate (TESSALON) 200 MG capsule *Therapy completed   • albuterol sulfate  (90 Base) MCG/ACT inhaler *Therapy completed   • FLUoxetine (PROzac) 60 MG tablet          Return in about 6 weeks (around 8/12/2020).

## 2020-07-06 ENCOUNTER — RESULTS ENCOUNTER (OUTPATIENT)
Dept: INTERNAL MEDICINE | Facility: CLINIC | Age: 55
End: 2020-07-06

## 2020-07-06 DIAGNOSIS — Z00.00 ROUTINE HEALTH MAINTENANCE: ICD-10-CM

## 2020-07-20 ENCOUNTER — OFFICE VISIT (OUTPATIENT)
Dept: SURGERY | Facility: CLINIC | Age: 55
End: 2020-07-20

## 2020-07-20 VITALS — WEIGHT: 176 LBS | BODY MASS INDEX: 34.55 KG/M2 | HEIGHT: 60 IN

## 2020-07-20 DIAGNOSIS — K42.0 UMBILICAL HERNIA WITH OBSTRUCTION, WITHOUT GANGRENE: Primary | ICD-10-CM

## 2020-07-20 PROCEDURE — 99243 OFF/OP CNSLTJ NEW/EST LOW 30: CPT | Performed by: SURGERY

## 2020-07-20 RX ORDER — FLUOXETINE HYDROCHLORIDE 60 MG/1
10 TABLET, FILM COATED ORAL; ORAL DAILY
COMMUNITY
End: 2020-07-29

## 2020-07-20 RX ORDER — CEFAZOLIN SODIUM 2 G/100ML
2 INJECTION, SOLUTION INTRAVENOUS ONCE
Status: CANCELLED | OUTPATIENT
Start: 2020-07-31 | End: 2020-07-20

## 2020-07-20 NOTE — PROGRESS NOTES
SUMMARY (A/P):    54-year-old lady with symptomatic umbilical hernia, wishes to proceed with open repair.  She understands the rationale for the procedure, nature of the procedure, and the risks including but not limited to bleeding, infection, use of mesh, and recurrence.      CC:    Hernia    HPI:    54-year-old lady with 2 to 3-month history of intermittent moderate periumbilical abdominal pain that is worse with activity and associated with visible and palpable bulge.    PSH:    • Laparoscopic cholecystectomy with cholangiogram 6/30/2017  • Colonoscopy 2013  • Hysterectomy   • Left inguinal hernia repair as a child  • Left hip surgery  • Right breast biopsy    PMH:    • GERD  • Anxiety/depression  • Rheumatoid arthritis  • Hypothyroidism   • History of PVC's     FAMILY HISTORY:    • Negative for colon cancer    SOCIAL HISTORY:   • Denies tobacco use  • Occasional alcohol use    ALLERGIES:   • Erythromycin-nausea, vomiting  • Adhesive tape-rash    MEDICATIONS:   • Zovirax  • Wellbutrin  • Dexilant  • Synthroid  • Lopressor  • Multivitamin  • Ditropan  • Zantac  • Trazodone  • Lexapro  • Prozac    ROS:    Influenza Like Illness: no fever, no  cough, no  sore throat, no  body aches, no loss of sense of taste or smell, no known exposure to person with Covid-19.  All other systems reviewed and negative other than presenting complaints.    PHYSICAL EXAM:   • Constitutional: Well-developed well-nourished, no acute distress  • Vital signs: Weight 176 pounds, height 60 inches, BMI 34.4  • Eyes: Conjunctiva normal, sclera nonicteric  • ENMT: Hearing grossly normal, oral mucosa moist  • Neck: Supple, no palpable mass, trachea midline  • Respiratory: Clear to auscultation, normal inspiratory effort  • Cardiovascular: Regular rate, no murmur, no carotid bruit, no peripheral edema, no jugular venous distention  • Gastrointestinal: Soft, nontender, no palpable mass, no hepatosplenomegaly, moderate size incarcerated umbilical  hernia  • Lymphatics (palpable nodes):  cervical-negative, axillary-negative  • Skin:  Warm, dry, no rash on visualized skin surfaces  • Musculoskeletal: Symmetric strength, normal gait  • Psychiatric: Alert and oriented ×3, normal affect     ZIA BASILIO M.D.

## 2020-07-29 ENCOUNTER — APPOINTMENT (OUTPATIENT)
Dept: PREADMISSION TESTING | Facility: HOSPITAL | Age: 55
End: 2020-07-29

## 2020-07-29 VITALS
DIASTOLIC BLOOD PRESSURE: 69 MMHG | HEIGHT: 60 IN | SYSTOLIC BLOOD PRESSURE: 107 MMHG | OXYGEN SATURATION: 98 % | HEART RATE: 61 BPM | BODY MASS INDEX: 35.14 KG/M2 | TEMPERATURE: 97.3 F | WEIGHT: 179 LBS | RESPIRATION RATE: 20 BRPM

## 2020-07-29 LAB
ANION GAP SERPL CALCULATED.3IONS-SCNC: 7.2 MMOL/L (ref 5–15)
BUN SERPL-MCNC: 11 MG/DL (ref 6–20)
BUN/CREAT SERPL: 15.1 (ref 7–25)
CALCIUM SPEC-SCNC: 9.1 MG/DL (ref 8.6–10.5)
CHLORIDE SERPL-SCNC: 99 MMOL/L (ref 98–107)
CO2 SERPL-SCNC: 25.8 MMOL/L (ref 22–29)
CREAT SERPL-MCNC: 0.73 MG/DL (ref 0.57–1)
DEPRECATED RDW RBC AUTO: 46.3 FL (ref 37–54)
ERYTHROCYTE [DISTWIDTH] IN BLOOD BY AUTOMATED COUNT: 12.5 % (ref 12.3–15.4)
GFR SERPL CREATININE-BSD FRML MDRD: 83 ML/MIN/1.73
GLUCOSE SERPL-MCNC: 91 MG/DL (ref 65–99)
HCT VFR BLD AUTO: 40.2 % (ref 34–46.6)
HGB BLD-MCNC: 13.6 G/DL (ref 12–15.9)
MCH RBC QN AUTO: 33.8 PG (ref 26.6–33)
MCHC RBC AUTO-ENTMCNC: 33.8 G/DL (ref 31.5–35.7)
MCV RBC AUTO: 100 FL (ref 79–97)
PLATELET # BLD AUTO: 253 10*3/MM3 (ref 140–450)
PMV BLD AUTO: 11.5 FL (ref 6–12)
POTASSIUM SERPL-SCNC: 4 MMOL/L (ref 3.5–5.2)
RBC # BLD AUTO: 4.02 10*6/MM3 (ref 3.77–5.28)
SODIUM SERPL-SCNC: 132 MMOL/L (ref 136–145)
WBC # BLD AUTO: 5.27 10*3/MM3 (ref 3.4–10.8)

## 2020-07-29 PROCEDURE — 80048 BASIC METABOLIC PNL TOTAL CA: CPT | Performed by: SURGERY

## 2020-07-29 PROCEDURE — U0004 COV-19 TEST NON-CDC HGH THRU: HCPCS | Performed by: NURSE PRACTITIONER

## 2020-07-29 PROCEDURE — C9803 HOPD COVID-19 SPEC COLLECT: HCPCS

## 2020-07-29 PROCEDURE — 85027 COMPLETE CBC AUTOMATED: CPT | Performed by: SURGERY

## 2020-07-29 PROCEDURE — 93010 ELECTROCARDIOGRAM REPORT: CPT | Performed by: INTERNAL MEDICINE

## 2020-07-29 PROCEDURE — 93005 ELECTROCARDIOGRAM TRACING: CPT

## 2020-07-29 PROCEDURE — 36415 COLL VENOUS BLD VENIPUNCTURE: CPT

## 2020-07-29 RX ORDER — IBUPROFEN 200 MG
600 TABLET ORAL EVERY 6 HOURS PRN
COMMUNITY

## 2020-07-29 RX ORDER — CHLORHEXIDINE GLUCONATE 500 MG/1
1 CLOTH TOPICAL
COMMUNITY
End: 2020-07-31 | Stop reason: HOSPADM

## 2020-07-30 LAB
REF LAB TEST METHOD: NORMAL
SARS-COV-2 RNA RESP QL NAA+PROBE: NOT DETECTED

## 2020-07-31 ENCOUNTER — HOSPITAL ENCOUNTER (OUTPATIENT)
Facility: HOSPITAL | Age: 55
Setting detail: HOSPITAL OUTPATIENT SURGERY
Discharge: HOME | End: 2020-07-31
Attending: SURGERY | Admitting: SURGERY

## 2020-07-31 ENCOUNTER — ANESTHESIA EVENT (OUTPATIENT)
Dept: PERIOP | Facility: HOSPITAL | Age: 55
End: 2020-07-31

## 2020-07-31 ENCOUNTER — ANESTHESIA (OUTPATIENT)
Dept: PERIOP | Facility: HOSPITAL | Age: 55
End: 2020-07-31

## 2020-07-31 VITALS
HEART RATE: 64 BPM | TEMPERATURE: 97.5 F | RESPIRATION RATE: 16 BRPM | SYSTOLIC BLOOD PRESSURE: 119 MMHG | OXYGEN SATURATION: 94 % | DIASTOLIC BLOOD PRESSURE: 61 MMHG

## 2020-07-31 DIAGNOSIS — K42.0 UMBILICAL HERNIA WITH OBSTRUCTION, WITHOUT GANGRENE: ICD-10-CM

## 2020-07-31 PROCEDURE — 25010000003 CEFAZOLIN IN DEXTROSE 2-4 GM/100ML-% SOLUTION: Performed by: SURGERY

## 2020-07-31 PROCEDURE — C1781 MESH (IMPLANTABLE): HCPCS | Performed by: SURGERY

## 2020-07-31 PROCEDURE — 25010000002 MIDAZOLAM PER 1 MG: Performed by: ANESTHESIOLOGY

## 2020-07-31 PROCEDURE — 25010000002 PROPOFOL 10 MG/ML EMULSION: Performed by: NURSE ANESTHETIST, CERTIFIED REGISTERED

## 2020-07-31 PROCEDURE — 25010000002 FENTANYL CITRATE (PF) 100 MCG/2ML SOLUTION: Performed by: NURSE ANESTHETIST, CERTIFIED REGISTERED

## 2020-07-31 PROCEDURE — 49560 PR REPAIR INCISIONAL HERNIA,REDUCIBLE: CPT | Performed by: SURGERY

## 2020-07-31 PROCEDURE — 49568 PR IMPLANT MESH HERNIA REPAIR/DEBRIDEMENT CLOSURE: CPT | Performed by: SURGERY

## 2020-07-31 PROCEDURE — 25010000002 FENTANYL CITRATE (PF) 100 MCG/2ML SOLUTION: Performed by: ANESTHESIOLOGY

## 2020-07-31 PROCEDURE — 49560 PR REPAIR INCISIONAL HERNIA,REDUCIBLE: CPT | Performed by: SPECIALIST/TECHNOLOGIST, OTHER

## 2020-07-31 PROCEDURE — 49568 PR IMPLANT MESH HERNIA REPAIR/DEBRIDEMENT CLOSURE: CPT | Performed by: SPECIALIST/TECHNOLOGIST, OTHER

## 2020-07-31 DEVICE — VENTRALEX ST HERNIA PATCH
Type: IMPLANTABLE DEVICE | Site: ABDOMEN | Status: FUNCTIONAL
Brand: VENTRALEX ST HERNIA PATCH

## 2020-07-31 RX ORDER — ACETAMINOPHEN 500 MG
500 TABLET ORAL ONCE
Status: COMPLETED | OUTPATIENT
Start: 2020-07-31 | End: 2020-07-31

## 2020-07-31 RX ORDER — HYDROCODONE BITARTRATE AND ACETAMINOPHEN 5; 325 MG/1; MG/1
1 TABLET ORAL ONCE AS NEEDED
Status: COMPLETED | OUTPATIENT
Start: 2020-07-31 | End: 2020-07-31

## 2020-07-31 RX ORDER — PROMETHAZINE HYDROCHLORIDE 25 MG/ML
6.25 INJECTION, SOLUTION INTRAMUSCULAR; INTRAVENOUS ONCE AS NEEDED
Status: DISCONTINUED | OUTPATIENT
Start: 2020-07-31 | End: 2020-07-31 | Stop reason: HOSPADM

## 2020-07-31 RX ORDER — ACETAMINOPHEN 325 MG/1
650 TABLET ORAL ONCE AS NEEDED
Status: DISCONTINUED | OUTPATIENT
Start: 2020-07-31 | End: 2020-07-31 | Stop reason: HOSPADM

## 2020-07-31 RX ORDER — SODIUM CHLORIDE 0.9 % (FLUSH) 0.9 %
10 SYRINGE (ML) INJECTION EVERY 12 HOURS SCHEDULED
Status: DISCONTINUED | OUTPATIENT
Start: 2020-07-31 | End: 2020-07-31 | Stop reason: HOSPADM

## 2020-07-31 RX ORDER — LIDOCAINE HYDROCHLORIDE 10 MG/ML
0.5 INJECTION, SOLUTION EPIDURAL; INFILTRATION; INTRACAUDAL; PERINEURAL ONCE AS NEEDED
Status: DISCONTINUED | OUTPATIENT
Start: 2020-07-31 | End: 2020-07-31 | Stop reason: HOSPADM

## 2020-07-31 RX ORDER — LIDOCAINE HYDROCHLORIDE AND EPINEPHRINE 10; 10 MG/ML; UG/ML
INJECTION, SOLUTION INFILTRATION; PERINEURAL AS NEEDED
Status: DISCONTINUED | OUTPATIENT
Start: 2020-07-31 | End: 2020-07-31 | Stop reason: HOSPADM

## 2020-07-31 RX ORDER — LIDOCAINE HYDROCHLORIDE 20 MG/ML
INJECTION, SOLUTION INFILTRATION; PERINEURAL AS NEEDED
Status: DISCONTINUED | OUTPATIENT
Start: 2020-07-31 | End: 2020-07-31 | Stop reason: SURG

## 2020-07-31 RX ORDER — FENTANYL CITRATE 50 UG/ML
25 INJECTION, SOLUTION INTRAMUSCULAR; INTRAVENOUS
Status: DISCONTINUED | OUTPATIENT
Start: 2020-07-31 | End: 2020-07-31 | Stop reason: HOSPADM

## 2020-07-31 RX ORDER — PROMETHAZINE HYDROCHLORIDE 25 MG/1
25 TABLET ORAL ONCE AS NEEDED
Status: DISCONTINUED | OUTPATIENT
Start: 2020-07-31 | End: 2020-07-31 | Stop reason: HOSPADM

## 2020-07-31 RX ORDER — MIDAZOLAM HYDROCHLORIDE 1 MG/ML
1 INJECTION INTRAMUSCULAR; INTRAVENOUS
Status: DISCONTINUED | OUTPATIENT
Start: 2020-07-31 | End: 2020-07-31 | Stop reason: HOSPADM

## 2020-07-31 RX ORDER — ACETAMINOPHEN 650 MG/1
650 SUPPOSITORY RECTAL ONCE AS NEEDED
Status: DISCONTINUED | OUTPATIENT
Start: 2020-07-31 | End: 2020-07-31 | Stop reason: HOSPADM

## 2020-07-31 RX ORDER — NALBUPHINE HCL 10 MG/ML
10 AMPUL (ML) INJECTION EVERY 4 HOURS PRN
Status: DISCONTINUED | OUTPATIENT
Start: 2020-07-31 | End: 2020-07-31 | Stop reason: HOSPADM

## 2020-07-31 RX ORDER — BUPIVACAINE HYDROCHLORIDE AND EPINEPHRINE 5; 5 MG/ML; UG/ML
INJECTION, SOLUTION PERINEURAL AS NEEDED
Status: DISCONTINUED | OUTPATIENT
Start: 2020-07-31 | End: 2020-07-31 | Stop reason: HOSPADM

## 2020-07-31 RX ORDER — SODIUM CHLORIDE 0.9 % (FLUSH) 0.9 %
10 SYRINGE (ML) INJECTION AS NEEDED
Status: DISCONTINUED | OUTPATIENT
Start: 2020-07-31 | End: 2020-07-31 | Stop reason: HOSPADM

## 2020-07-31 RX ORDER — PROMETHAZINE HYDROCHLORIDE 25 MG/1
25 SUPPOSITORY RECTAL ONCE AS NEEDED
Status: DISCONTINUED | OUTPATIENT
Start: 2020-07-31 | End: 2020-07-31 | Stop reason: HOSPADM

## 2020-07-31 RX ORDER — SODIUM CHLORIDE, SODIUM LACTATE, POTASSIUM CHLORIDE, CALCIUM CHLORIDE 600; 310; 30; 20 MG/100ML; MG/100ML; MG/100ML; MG/100ML
9 INJECTION, SOLUTION INTRAVENOUS CONTINUOUS
Status: DISCONTINUED | OUTPATIENT
Start: 2020-07-31 | End: 2020-07-31 | Stop reason: HOSPADM

## 2020-07-31 RX ORDER — HYDRALAZINE HYDROCHLORIDE 20 MG/ML
5 INJECTION INTRAMUSCULAR; INTRAVENOUS
Status: DISCONTINUED | OUTPATIENT
Start: 2020-07-31 | End: 2020-07-31 | Stop reason: HOSPADM

## 2020-07-31 RX ORDER — ONDANSETRON 4 MG/1
4 TABLET, FILM COATED ORAL EVERY 6 HOURS PRN
Qty: 10 TABLET | Refills: 1 | Status: SHIPPED | OUTPATIENT
Start: 2020-07-31 | End: 2021-03-08

## 2020-07-31 RX ORDER — CEFAZOLIN SODIUM 2 G/100ML
2 INJECTION, SOLUTION INTRAVENOUS ONCE
Status: COMPLETED | OUTPATIENT
Start: 2020-07-31 | End: 2020-07-31

## 2020-07-31 RX ORDER — NALOXONE HCL 0.4 MG/ML
0.4 VIAL (ML) INJECTION AS NEEDED
Status: DISCONTINUED | OUTPATIENT
Start: 2020-07-31 | End: 2020-07-31 | Stop reason: HOSPADM

## 2020-07-31 RX ORDER — PROPOFOL 10 MG/ML
VIAL (ML) INTRAVENOUS AS NEEDED
Status: DISCONTINUED | OUTPATIENT
Start: 2020-07-31 | End: 2020-07-31 | Stop reason: SURG

## 2020-07-31 RX ORDER — MAGNESIUM HYDROXIDE 1200 MG/15ML
LIQUID ORAL AS NEEDED
Status: DISCONTINUED | OUTPATIENT
Start: 2020-07-31 | End: 2020-07-31 | Stop reason: HOSPADM

## 2020-07-31 RX ORDER — HYDROCODONE BITARTRATE AND ACETAMINOPHEN 5; 325 MG/1; MG/1
TABLET ORAL
Qty: 24 TABLET | Refills: 0 | Status: SHIPPED | OUTPATIENT
Start: 2020-07-31 | End: 2020-11-23

## 2020-07-31 RX ORDER — DIPHENHYDRAMINE HYDROCHLORIDE 50 MG/ML
12.5 INJECTION INTRAMUSCULAR; INTRAVENOUS
Status: DISCONTINUED | OUTPATIENT
Start: 2020-07-31 | End: 2020-07-31 | Stop reason: HOSPADM

## 2020-07-31 RX ORDER — NALBUPHINE HCL 10 MG/ML
2 AMPUL (ML) INJECTION EVERY 4 HOURS PRN
Status: DISCONTINUED | OUTPATIENT
Start: 2020-07-31 | End: 2020-07-31 | Stop reason: HOSPADM

## 2020-07-31 RX ORDER — FENTANYL CITRATE 50 UG/ML
INJECTION, SOLUTION INTRAMUSCULAR; INTRAVENOUS AS NEEDED
Status: DISCONTINUED | OUTPATIENT
Start: 2020-07-31 | End: 2020-07-31 | Stop reason: SURG

## 2020-07-31 RX ORDER — HYDROMORPHONE HYDROCHLORIDE 1 MG/ML
0.25 INJECTION, SOLUTION INTRAMUSCULAR; INTRAVENOUS; SUBCUTANEOUS
Status: DISCONTINUED | OUTPATIENT
Start: 2020-07-31 | End: 2020-07-31 | Stop reason: HOSPADM

## 2020-07-31 RX ADMIN — SODIUM CHLORIDE, POTASSIUM CHLORIDE, SODIUM LACTATE AND CALCIUM CHLORIDE 9 ML/HR: 600; 310; 30; 20 INJECTION, SOLUTION INTRAVENOUS at 10:05

## 2020-07-31 RX ADMIN — LIDOCAINE HYDROCHLORIDE 80 MG: 20 INJECTION, SOLUTION INFILTRATION; PERINEURAL at 08:49

## 2020-07-31 RX ADMIN — ACETAMINOPHEN 500 MG: 500 TABLET, FILM COATED ORAL at 08:13

## 2020-07-31 RX ADMIN — SODIUM CHLORIDE, POTASSIUM CHLORIDE, SODIUM LACTATE AND CALCIUM CHLORIDE 9 ML/HR: 600; 310; 30; 20 INJECTION, SOLUTION INTRAVENOUS at 08:13

## 2020-07-31 RX ADMIN — FENTANYL CITRATE 50 MCG: 50 INJECTION INTRAMUSCULAR; INTRAVENOUS at 08:49

## 2020-07-31 RX ADMIN — FENTANYL CITRATE 50 MCG: 50 INJECTION INTRAMUSCULAR; INTRAVENOUS at 09:00

## 2020-07-31 RX ADMIN — PROPOFOL 50 MG: 10 INJECTION, EMULSION INTRAVENOUS at 08:53

## 2020-07-31 RX ADMIN — FENTANYL CITRATE 25 MCG: 50 INJECTION, SOLUTION INTRAMUSCULAR; INTRAVENOUS at 09:55

## 2020-07-31 RX ADMIN — HYDROCODONE BITARTRATE AND ACETAMINOPHEN 1 TABLET: 5; 325 TABLET ORAL at 10:15

## 2020-07-31 RX ADMIN — PROPOFOL 140 MCG/KG/MIN: 10 INJECTION, EMULSION INTRAVENOUS at 08:49

## 2020-07-31 RX ADMIN — PROPOFOL 50 MG: 10 INJECTION, EMULSION INTRAVENOUS at 08:56

## 2020-07-31 RX ADMIN — CEFAZOLIN SODIUM 2 G: 2 INJECTION, SOLUTION INTRAVENOUS at 08:43

## 2020-07-31 RX ADMIN — PROPOFOL 50 MG: 10 INJECTION, EMULSION INTRAVENOUS at 08:49

## 2020-07-31 RX ADMIN — MIDAZOLAM 1 MG: 1 INJECTION INTRAMUSCULAR; INTRAVENOUS at 08:13

## 2020-07-31 RX ADMIN — FENTANYL CITRATE 25 MCG: 50 INJECTION, SOLUTION INTRAMUSCULAR; INTRAVENOUS at 09:48

## 2020-07-31 NOTE — ANESTHESIA PREPROCEDURE EVALUATION
Anesthesia Evaluation     history of anesthetic complications: PONV  NPO Solid Status: > 8 hours             Airway   Mallampati: II  TM distance: >3 FB  Neck ROM: full  No difficulty expected  Dental - normal exam     Pulmonary - negative pulmonary ROS and normal exam   Cardiovascular     ECG reviewed  Rhythm: regular    (+) hypertension,       Neuro/Psych  (+) dizziness/light headedness,     GI/Hepatic/Renal/Endo    (+) obesity,  hiatal hernia, GERD,      Musculoskeletal     Abdominal   (+) obese,    Substance History      OB/GYN          Other                        Anesthesia Plan    ASA 3     general   (  D/W R&B of GA including but not limited to: heart, lung, liver, kidney, neurologic problems, positioning injuries, dental damage, corneal abrasion and TMJ.        Also d/w pt MAC  .)  intravenous induction     Anesthetic plan, all risks, benefits, and alternatives have been provided, discussed and informed consent has been obtained with: patient.

## 2020-07-31 NOTE — ANESTHESIA POSTPROCEDURE EVALUATION
Patient: Aundrea Mcgarry    Procedure Summary     Date:  07/31/20 Room / Location:   [facility]  [facility]    Anesthesia Start:  0845 Anesthesia Stop:  0939    Procedure:  INCISIONAL HERNIA REPAIR WITH MESH (N/A Abdomen) Diagnosis:       Umbilical hernia with obstruction, without gangrene      (Umbilical hernia with obstruction, without gangrene [K42.0])    Surgeon:  David Kirkland MD Provider:  Harjinder Bonilla MD    Anesthesia Type:  general ASA Status:  3          Anesthesia Type: general    Vitals  Vitals Value Taken Time   /57 7/31/2020  9:29 AM   Temp     Pulse 65 7/31/2020  9:29 AM   Resp 16 7/31/2020  9:29 AM   SpO2 100 % 7/31/2020  9:29 AM           Post Anesthesia Care and Evaluation    Patient location during evaluation: bedside  Patient participation: complete - patient participated  Level of consciousness: awake and alert  Pain management: adequate  Airway patency: patent  Anesthetic complications: No anesthetic complications  PONV Status: controlled  Cardiovascular status: acceptable  Respiratory status: acceptable  Hydration status: acceptable

## 2020-08-06 ENCOUNTER — OFFICE VISIT (OUTPATIENT)
Dept: SURGERY | Facility: CLINIC | Age: 55
End: 2020-08-06

## 2020-08-06 VITALS — BODY MASS INDEX: 34.44 KG/M2 | HEIGHT: 60 IN | WEIGHT: 175.4 LBS

## 2020-08-06 DIAGNOSIS — Z09 FOLLOW UP: Primary | ICD-10-CM

## 2020-08-06 PROCEDURE — 99024 POSTOP FOLLOW-UP VISIT: CPT | Performed by: SURGERY

## 2020-08-08 NOTE — PROGRESS NOTES
Postoperative visit    Open incisional hernia repair with mesh 7/31/2020    Office visit: Her hernia ended up being an incisional hernia with 2 defects adjacent to the umbilicus.  This was repaired with mesh and she is currently doing well.  Incision is healing nicely.  No apparent postoperative problems.  Activity restrictions discussed.

## 2020-10-05 RX ORDER — TRAZODONE HYDROCHLORIDE 100 MG/1
TABLET ORAL
Qty: 30 TABLET | Refills: 2 | Status: SHIPPED | OUTPATIENT
Start: 2020-10-05 | End: 2021-03-25

## 2020-11-04 DIAGNOSIS — E03.9 HYPOTHYROIDISM, UNSPECIFIED TYPE: ICD-10-CM

## 2020-11-04 RX ORDER — LEVOTHYROXINE SODIUM 137 UG/1
TABLET ORAL
Qty: 30 TABLET | Refills: 4 | Status: SHIPPED | OUTPATIENT
Start: 2020-11-04 | End: 2020-11-25 | Stop reason: SDUPTHER

## 2020-11-04 RX ORDER — OXYBUTYNIN CHLORIDE 5 MG/1
TABLET, EXTENDED RELEASE ORAL
Qty: 90 TABLET | Refills: 0 | Status: SHIPPED | OUTPATIENT
Start: 2020-11-04 | End: 2021-02-04

## 2020-11-23 ENCOUNTER — RESULTS ENCOUNTER (OUTPATIENT)
Dept: INTERNAL MEDICINE | Facility: CLINIC | Age: 55
End: 2020-11-23

## 2020-11-23 ENCOUNTER — OFFICE VISIT (OUTPATIENT)
Dept: INTERNAL MEDICINE | Facility: CLINIC | Age: 55
End: 2020-11-23

## 2020-11-23 VITALS
WEIGHT: 179 LBS | RESPIRATION RATE: 16 BRPM | SYSTOLIC BLOOD PRESSURE: 120 MMHG | HEART RATE: 67 BPM | BODY MASS INDEX: 35.14 KG/M2 | TEMPERATURE: 97.1 F | DIASTOLIC BLOOD PRESSURE: 80 MMHG | HEIGHT: 60 IN | OXYGEN SATURATION: 98 %

## 2020-11-23 DIAGNOSIS — Z87.42 HISTORY OF OVARIAN CYST: ICD-10-CM

## 2020-11-23 DIAGNOSIS — R31.9 HEMATURIA, UNSPECIFIED TYPE: ICD-10-CM

## 2020-11-23 DIAGNOSIS — Z00.00 ANNUAL PHYSICAL EXAM: Primary | ICD-10-CM

## 2020-11-23 DIAGNOSIS — R10.2 PELVIC PAIN: ICD-10-CM

## 2020-11-23 DIAGNOSIS — E03.9 HYPOTHYROIDISM, UNSPECIFIED TYPE: ICD-10-CM

## 2020-11-23 DIAGNOSIS — R91.1 PULMONARY NODULE, RIGHT: ICD-10-CM

## 2020-11-23 DIAGNOSIS — F41.8 DEPRESSION WITH ANXIETY: ICD-10-CM

## 2020-11-23 DIAGNOSIS — M25.50 ARTHRALGIA, UNSPECIFIED JOINT: ICD-10-CM

## 2020-11-23 LAB
BILIRUB BLD-MCNC: NEGATIVE MG/DL
CLARITY, POC: ABNORMAL
COLOR UR: YELLOW
GLUCOSE UR STRIP-MCNC: NEGATIVE MG/DL
KETONES UR QL: NEGATIVE
LEUKOCYTE EST, POC: NEGATIVE
NITRITE UR-MCNC: NEGATIVE MG/ML
PH UR: 5.5 [PH] (ref 5–8)
PROT UR STRIP-MCNC: NEGATIVE MG/DL
RBC # UR STRIP: ABNORMAL /UL
SP GR UR: 1 (ref 1–1.03)
UROBILINOGEN UR QL: NORMAL

## 2020-11-23 PROCEDURE — 99396 PREV VISIT EST AGE 40-64: CPT | Performed by: FAMILY MEDICINE

## 2020-11-23 PROCEDURE — 81003 URINALYSIS AUTO W/O SCOPE: CPT | Performed by: FAMILY MEDICINE

## 2020-11-23 NOTE — PROGRESS NOTES
"Patient Name: Aundrea Guillaume is a 55 y.o. female presenting for Gynecologic Exam (s/p hx w/o ooph-having bilateral ovarian pain), Annual Exam, Heartburn, and Hypothyroidism      Well Adult Physical   Patient here for a comprehensive physical exam.The patient reports problems - \"ovary pain\" comes and goes, both sides.  More frequent that previously with ovulation.    Do you take any herbs or supplements that were not prescribed by a doctor? no   Are you taking calcium supplements? no   Are you taking aspirin daily? no     History:  Any STD's in the past? none    Aundrea Mcgarry 55 y.o. female who presents for an Annual Wellness Visit.  she has a history of   Patient Active Problem List   Diagnosis   • Abdominal pain   • Arthritis   • Bunion   • Gastroesophageal reflux disease with esophagitis   • Palpitations   • Dyskinesia of esophagus   • Ventricular premature beats   • Recurrent herpes labialis   • Hypothyroidism   • Dizziness   • Bilateral shoulder pain   • Bursitis/tendonitis, shoulder   • Annual physical exam   • Depression with anxiety   • Dysphagia   • Osteopenia of both thighs   • Hiatal hernia   • OAB (overactive bladder)   • Pulmonary nodule, right   • Umbilical hernia with obstruction, without gangrene   .  she has been doing well with new interval problems.      Health Habits:  Dental Exam. not up to date - will schedule  Eye Exam. up to date  Exercise: 3 times/week.  Current exercise activities include: housecleaning      The following portions of the patient's history were reviewed and updated as appropriate: allergies, current medications, past family history, past medical history, past social history, past surgical history and problem list.    Review of Systems   Constitutional: Negative.    HENT: Negative.    Eyes: Negative.    Respiratory: Negative.    Cardiovascular: Negative.    Gastrointestinal: Negative.    Endocrine: Negative.    Genitourinary: Positive for pelvic " pain.   Musculoskeletal: Negative.    Skin: Negative.    Allergic/Immunologic: Negative.    Neurological: Negative.    Hematological: Negative.    Psychiatric/Behavioral: Negative.        Erythromycin and Adhesive tape      Current Outpatient Medications:   •  acyclovir (ZOVIRAX) 400 MG tablet, TAKE ONE TABLET BY MOUTH DAILY, Disp: 30 tablet, Rfl: 5  •  aluminum hydroxide-mag carbonate (GAVISCON EXTRA RELIEF) 160-105 MG chewable tablet chewable tablet, Chew 1-2 tablets As Needed., Disp: , Rfl:   •  buPROPion XL (WELLBUTRIN XL) 300 MG 24 hr tablet, TAKE ONE TABLET BY MOUTH EVERY MORNING, Disp: 30 tablet, Rfl: 5  •  CBD oil (cannabidiol) capsule, Take  by mouth., Disp: , Rfl:   •  dexlansoprazole (DEXILANT) 60 MG capsule, Take 60 mg by mouth Every Evening., Disp: , Rfl:   •  escitalopram (LEXAPRO) 10 MG tablet, Take 1 tablet by mouth Daily., Disp: 30 tablet, Rfl: 5  •  ibuprofen (ADVIL,MOTRIN) 200 MG tablet, Take 600 mg by mouth Every 6 (Six) Hours As Needed for Mild Pain ., Disp: , Rfl:   •  levothyroxine (SYNTHROID, LEVOTHROID) 137 MCG tablet, TAKE ONE TABLET BY MOUTH DAILY, Disp: 30 tablet, Rfl: 4  •  metoprolol tartrate (LOPRESSOR) 25 MG tablet, Take 1 tablet by mouth 2 (Two) Times a Day., Disp: 180 tablet, Rfl: 0  •  MULTIPLE VITAMIN PO, Take 1 tablet by mouth Daily., Disp: , Rfl:   •  oxybutynin XL (DITROPAN-XL) 5 MG 24 hr tablet, TAKE ONE TABLET BY MOUTH DAILY, Disp: 90 tablet, Rfl: 0  •  ranitidine (ZANTAC) 150 MG tablet, Take 150 mg by mouth As Needed for Heartburn or Indigestion., Disp: , Rfl:   •  traZODone (DESYREL) 100 MG tablet, TAKE ONE TABLET BY MOUTH EVERY NIGHT AT BEDTIME, Disp: 30 tablet, Rfl: 2  •  ondansetron (Zofran) 4 MG tablet, Take 1 tablet by mouth Every 6 (Six) Hours As Needed for Nausea or Vomiting for up to 10 doses., Disp: 10 tablet, Rfl: 1    OBJECTIVE    /80 (BP Location: Left arm, Patient Position: Sitting, Cuff Size: Large Adult)   Pulse 67   Temp 97.1 °F (36.2 °C) (Temporal)   " Resp 16   Ht 152.4 cm (60\")   Wt 81.2 kg (179 lb)   SpO2 98%   BMI 34.96 kg/m²     Physical Exam  Constitutional:       General: She is not in acute distress.     Appearance: Normal appearance. She is well-developed.   HENT:      Head: Normocephalic and atraumatic.      Right Ear: Tympanic membrane and external ear normal.      Left Ear: Tympanic membrane and external ear normal.      Nose: Nose normal.      Mouth/Throat:      Mouth: Mucous membranes are moist.   Eyes:      General: No scleral icterus.     Extraocular Movements: Extraocular movements intact.      Conjunctiva/sclera: Conjunctivae normal.      Pupils: Pupils are equal, round, and reactive to light.   Neck:      Musculoskeletal: Normal range of motion and neck supple.      Thyroid: No thyromegaly.   Cardiovascular:      Rate and Rhythm: Normal rate and regular rhythm.      Heart sounds: Normal heart sounds. No murmur. No friction rub. No gallop.    Pulmonary:      Effort: Pulmonary effort is normal. No respiratory distress.      Breath sounds: Normal breath sounds. No wheezing or rales.   Abdominal:      General: Bowel sounds are normal.      Palpations: Abdomen is soft.   Genitourinary:     Exam position: Supine.      Labia:         Right: No lesion.         Left: No lesion.       Vagina: Normal. No vaginal discharge.      Adnexa:         Right: No mass, tenderness or fullness.          Left: No mass, tenderness or fullness.     Musculoskeletal: Normal range of motion.      Right lower leg: No edema.      Left lower leg: No edema.   Lymphadenopathy:      Cervical: No cervical adenopathy.   Skin:     General: Skin is warm and dry.      Capillary Refill: Capillary refill takes less than 2 seconds.      Findings: No lesion (No skin lesions.) or rash.   Neurological:      General: No focal deficit present.      Mental Status: She is alert and oriented to person, place, and time.      Cranial Nerves: No cranial nerve deficit.      Deep Tendon " Reflexes: Reflexes are normal and symmetric. Reflexes normal.   Psychiatric:         Mood and Affect: Mood normal.         Behavior: Behavior normal.         ASSESSMENT AND PLAN  Update vaccines if indicated.    begin progressive daily aerobic exercise program, continue current medications, continue current healthy lifestyle patterns and return for routine annual checkups    Diagnoses and all orders for this visit:    1. Annual physical exam (Primary)  -     Vitamin B12  -     Vitamin D 25 Hydroxy  -     Lipid Panel With / Chol / HDL Ratio  -     CBC & Differential  -     Comprehensive Metabolic Panel  -     Mammo Screening Bilateral With CAD; Future    2. Hypothyroidism, unspecified type  -     T4, Free  -     TSH    3. Depression with anxiety    4. Pulmonary nodule, right  -     CT Chest With Contrast; Future    5. Pelvic pain  -     US Pelvis Complete; Future    6. Arthralgia, unspecified joint  -     C-reactive Protein  -     Sedimentation Rate    7. History of ovarian cyst  -     US Pelvis Complete; Future    Other orders  -     Cancel: Pap IG, HPV-hr; Future      Flu vaccine declined.  Shingrix at pharmacy.  Colonoscopy UTD until 2023.  Mammogram ordered.     1. Hypothyroidism.  On replacement.  Check labs today.     2. Depression with anxiety.  Controlled with bupropion and escitalopram.     3. Pulmonary nodularity.  Radiologist recommended repeat CT chest in 1 year (12/2020) to include prone lung base images.    4. Pelvic pain.  Normal bimanual exam.  Check vaginal u/s.    5. Arthralgia.  Previous diagnosis of rheumatoid arthritis years ago; saw a rheumatologist and took medication.  Has been in remission.  Now c/o increased pain and swelling in the hands.  No morning stiffness.    [unfilled]    Return in about 6 months (around 5/23/2021).

## 2020-11-24 LAB
25(OH)D3+25(OH)D2 SERPL-MCNC: 34.2 NG/ML (ref 30–100)
ALBUMIN SERPL-MCNC: 4.3 G/DL (ref 3.8–4.9)
ALBUMIN/GLOB SERPL: 1.7 {RATIO} (ref 1.2–2.2)
ALP SERPL-CCNC: 84 IU/L (ref 39–117)
ALT SERPL-CCNC: 19 IU/L (ref 0–32)
AST SERPL-CCNC: 19 IU/L (ref 0–40)
BASOPHILS # BLD AUTO: 0.1 X10E3/UL (ref 0–0.2)
BASOPHILS NFR BLD AUTO: 1 %
BILIRUB SERPL-MCNC: 0.4 MG/DL (ref 0–1.2)
BUN SERPL-MCNC: 12 MG/DL (ref 6–24)
BUN/CREAT SERPL: 17 (ref 9–23)
CALCIUM SERPL-MCNC: 9.8 MG/DL (ref 8.7–10.2)
CHLORIDE SERPL-SCNC: 104 MMOL/L (ref 96–106)
CHOLEST SERPL-MCNC: 206 MG/DL (ref 100–199)
CHOLEST/HDLC SERPL: 3.6 RATIO (ref 0–4.4)
CO2 SERPL-SCNC: 25 MMOL/L (ref 20–29)
CREAT SERPL-MCNC: 0.7 MG/DL (ref 0.57–1)
CRP SERPL-MCNC: 3 MG/L (ref 0–10)
EOSINOPHIL # BLD AUTO: 0.1 X10E3/UL (ref 0–0.4)
EOSINOPHIL NFR BLD AUTO: 3 %
ERYTHROCYTE [DISTWIDTH] IN BLOOD BY AUTOMATED COUNT: 11.8 % (ref 11.7–15.4)
ERYTHROCYTE [SEDIMENTATION RATE] IN BLOOD BY WESTERGREN METHOD: 6 MM/HR (ref 0–40)
GLOBULIN SER CALC-MCNC: 2.6 G/DL (ref 1.5–4.5)
GLUCOSE SERPL-MCNC: 87 MG/DL (ref 65–99)
HCT VFR BLD AUTO: 40.7 % (ref 34–46.6)
HDLC SERPL-MCNC: 57 MG/DL
HGB BLD-MCNC: 14.2 G/DL (ref 11.1–15.9)
IMM GRANULOCYTES # BLD AUTO: 0 X10E3/UL (ref 0–0.1)
IMM GRANULOCYTES NFR BLD AUTO: 1 %
LDLC SERPL CALC-MCNC: 132 MG/DL (ref 0–99)
LYMPHOCYTES # BLD AUTO: 1.3 X10E3/UL (ref 0.7–3.1)
LYMPHOCYTES NFR BLD AUTO: 30 %
MCH RBC QN AUTO: 33.1 PG (ref 26.6–33)
MCHC RBC AUTO-ENTMCNC: 34.9 G/DL (ref 31.5–35.7)
MCV RBC AUTO: 95 FL (ref 79–97)
MONOCYTES # BLD AUTO: 0.4 X10E3/UL (ref 0.1–0.9)
MONOCYTES NFR BLD AUTO: 9 %
NEUTROPHILS # BLD AUTO: 2.5 X10E3/UL (ref 1.4–7)
NEUTROPHILS NFR BLD AUTO: 56 %
PLATELET # BLD AUTO: 244 X10E3/UL (ref 150–450)
POTASSIUM SERPL-SCNC: 4.9 MMOL/L (ref 3.5–5.2)
PROT SERPL-MCNC: 6.9 G/DL (ref 6–8.5)
RBC # BLD AUTO: 4.29 X10E6/UL (ref 3.77–5.28)
SODIUM SERPL-SCNC: 143 MMOL/L (ref 134–144)
T4 FREE SERPL-MCNC: 1.83 NG/DL (ref 0.82–1.77)
TRIGL SERPL-MCNC: 95 MG/DL (ref 0–149)
TSH SERPL DL<=0.005 MIU/L-ACNC: 0.03 UIU/ML (ref 0.45–4.5)
VIT B12 SERPL-MCNC: 322 PG/ML (ref 232–1245)
VLDLC SERPL CALC-MCNC: 17 MG/DL (ref 5–40)
WBC # BLD AUTO: 4.4 X10E3/UL (ref 3.4–10.8)

## 2020-11-25 ENCOUNTER — TRANSCRIBE ORDERS (OUTPATIENT)
Dept: ADMINISTRATIVE | Facility: HOSPITAL | Age: 55
End: 2020-11-25

## 2020-11-25 DIAGNOSIS — E03.9 HYPOTHYROIDISM, UNSPECIFIED TYPE: ICD-10-CM

## 2020-11-25 DIAGNOSIS — Z12.31 SCREENING MAMMOGRAM, ENCOUNTER FOR: Primary | ICD-10-CM

## 2020-11-25 LAB
BACTERIA UR CULT: NORMAL
BACTERIA UR CULT: NORMAL

## 2020-11-25 RX ORDER — LEVOTHYROXINE SODIUM 0.12 MG/1
137 TABLET ORAL DAILY
Qty: 30 TABLET | Refills: 2 | Status: SHIPPED | OUTPATIENT
Start: 2020-11-25 | End: 2021-03-11

## 2020-11-30 ENCOUNTER — RESULTS ENCOUNTER (OUTPATIENT)
Dept: INTERNAL MEDICINE | Facility: CLINIC | Age: 55
End: 2020-11-30

## 2020-11-30 DIAGNOSIS — E03.9 HYPOTHYROIDISM, UNSPECIFIED TYPE: ICD-10-CM

## 2020-12-11 DIAGNOSIS — F41.8 DEPRESSION WITH ANXIETY: ICD-10-CM

## 2020-12-13 RX ORDER — BUPROPION HYDROCHLORIDE 300 MG/1
300 TABLET ORAL EVERY MORNING
Qty: 30 TABLET | Refills: 5 | Status: SHIPPED | OUTPATIENT
Start: 2020-12-13 | End: 2021-06-27

## 2020-12-13 RX ORDER — ACYCLOVIR 400 MG/1
400 TABLET ORAL DAILY
Qty: 30 TABLET | Refills: 5 | Status: SHIPPED | OUTPATIENT
Start: 2020-12-13 | End: 2021-06-27

## 2020-12-23 ENCOUNTER — HOSPITAL ENCOUNTER (OUTPATIENT)
Dept: CT IMAGING | Facility: HOSPITAL | Age: 55
Discharge: HOME | End: 2020-12-23

## 2020-12-23 ENCOUNTER — HOSPITAL ENCOUNTER (OUTPATIENT)
Dept: ULTRASOUND IMAGING | Facility: HOSPITAL | Age: 55
Discharge: HOME | End: 2020-12-23

## 2020-12-23 ENCOUNTER — HOSPITAL ENCOUNTER (OUTPATIENT)
Dept: MAMMOGRAPHY | Facility: HOSPITAL | Age: 55
Discharge: HOME | End: 2020-12-23

## 2020-12-23 DIAGNOSIS — R91.1 PULMONARY NODULE, RIGHT: ICD-10-CM

## 2020-12-23 DIAGNOSIS — R10.2 PELVIC PAIN: ICD-10-CM

## 2020-12-23 DIAGNOSIS — Z12.31 SCREENING MAMMOGRAM, ENCOUNTER FOR: ICD-10-CM

## 2020-12-23 DIAGNOSIS — Z87.42 HISTORY OF OVARIAN CYST: ICD-10-CM

## 2020-12-23 PROCEDURE — 76856 US EXAM PELVIC COMPLETE: CPT

## 2020-12-23 PROCEDURE — 77063 BREAST TOMOSYNTHESIS BI: CPT

## 2020-12-23 PROCEDURE — 0 IOPAMIDOL PER 1 ML: Performed by: FAMILY MEDICINE

## 2020-12-23 PROCEDURE — 76830 TRANSVAGINAL US NON-OB: CPT

## 2020-12-23 PROCEDURE — 71260 CT THORAX DX C+: CPT

## 2020-12-23 PROCEDURE — 77067 SCR MAMMO BI INCL CAD: CPT

## 2020-12-23 RX ADMIN — IOPAMIDOL 100 ML: 755 INJECTION, SOLUTION INTRAVENOUS at 10:19

## 2021-02-04 RX ORDER — OXYBUTYNIN CHLORIDE 5 MG/1
TABLET, EXTENDED RELEASE ORAL
Qty: 90 TABLET | Refills: 0 | Status: SHIPPED | OUTPATIENT
Start: 2021-02-04 | End: 2021-05-06

## 2021-02-17 DIAGNOSIS — F41.8 DEPRESSION WITH ANXIETY: ICD-10-CM

## 2021-02-18 RX ORDER — ESCITALOPRAM OXALATE 10 MG/1
TABLET ORAL
Qty: 30 TABLET | Refills: 4 | Status: SHIPPED | OUTPATIENT
Start: 2021-02-18 | End: 2021-07-29

## 2021-03-08 ENCOUNTER — OFFICE VISIT (OUTPATIENT)
Dept: INTERNAL MEDICINE | Facility: CLINIC | Age: 56
End: 2021-03-08

## 2021-03-08 VITALS
SYSTOLIC BLOOD PRESSURE: 126 MMHG | TEMPERATURE: 97.8 F | HEART RATE: 76 BPM | HEIGHT: 60 IN | WEIGHT: 177.8 LBS | BODY MASS INDEX: 34.91 KG/M2 | OXYGEN SATURATION: 98 % | DIASTOLIC BLOOD PRESSURE: 76 MMHG | RESPIRATION RATE: 16 BRPM

## 2021-03-08 DIAGNOSIS — M25.551 CHRONIC PAIN OF BOTH HIPS: ICD-10-CM

## 2021-03-08 DIAGNOSIS — G89.29 CHRONIC PAIN OF BOTH SHOULDERS: ICD-10-CM

## 2021-03-08 DIAGNOSIS — M25.542 ARTHRALGIA OF BOTH HANDS: Primary | ICD-10-CM

## 2021-03-08 DIAGNOSIS — R53.83 FATIGUE, UNSPECIFIED TYPE: ICD-10-CM

## 2021-03-08 DIAGNOSIS — M25.561 CHRONIC PAIN OF BOTH KNEES: ICD-10-CM

## 2021-03-08 DIAGNOSIS — M25.541 ARTHRALGIA OF BOTH HANDS: Primary | ICD-10-CM

## 2021-03-08 DIAGNOSIS — M25.512 CHRONIC PAIN OF BOTH SHOULDERS: ICD-10-CM

## 2021-03-08 DIAGNOSIS — E03.9 HYPOTHYROIDISM, UNSPECIFIED TYPE: Primary | ICD-10-CM

## 2021-03-08 DIAGNOSIS — G89.29 CHRONIC PAIN OF BOTH KNEES: ICD-10-CM

## 2021-03-08 DIAGNOSIS — M25.562 CHRONIC PAIN OF BOTH KNEES: ICD-10-CM

## 2021-03-08 DIAGNOSIS — E03.9 HYPOTHYROIDISM, UNSPECIFIED TYPE: ICD-10-CM

## 2021-03-08 DIAGNOSIS — G56.03 CARPAL TUNNEL SYNDROME ON BOTH SIDES: ICD-10-CM

## 2021-03-08 DIAGNOSIS — R53.82 CHRONIC FATIGUE: ICD-10-CM

## 2021-03-08 DIAGNOSIS — M25.552 CHRONIC PAIN OF BOTH HIPS: ICD-10-CM

## 2021-03-08 DIAGNOSIS — M25.511 CHRONIC PAIN OF BOTH SHOULDERS: ICD-10-CM

## 2021-03-08 DIAGNOSIS — G89.29 CHRONIC PAIN OF BOTH HIPS: ICD-10-CM

## 2021-03-08 PROCEDURE — 99214 OFFICE O/P EST MOD 30 MIN: CPT | Performed by: FAMILY MEDICINE

## 2021-03-08 NOTE — PROGRESS NOTES
Subjective   Aundrea Mcgarry is a 55 y.o. female presenting today for follow up of   Chief Complaint   Patient presents with   • Pain     chronic pain all over and states pain is getting worse over past 3-4 months    • Fatigue       History of Present Illness     Pt presents with the complaint of joint and muscle pains and fatigue.  She c/o pain in the lateral sides of both feet as well as the left great toe.  She has seen Dr. Jarvis in the past and has tried OTC orthotics.  She denies swelling in the joints of the feet.  She also c/o pain in the hands, which occurs throughout the day.  No morning stiffness or swelling but she does sometimes wake up during the night with hand numbness, which resolves with position change.  She paints and works on a computer.  She c/o pain and tenderness in both knees.  Denies history of knee injury.  She reports that several family members, including parents and siblings have had knee OA and joint replacement.  She c/o pain in both shoulders and upper arms.  She also c/o pain in the sides of the hips.  She has a history of left hip surgery.    Pt also reports fatigue.  She states that she sleeps well but is fatigued throughout the day.  She doesn't describe sleepiness, but low energy.  She is currently sedentary and enjoys walking for exercise but this is currently uncomfortable d/t her painful feet.  Her levothyroxine dose was decreased 4 months ago.    Patient Active Problem List   Diagnosis   • Abdominal pain   • Arthritis   • Bunion   • Gastroesophageal reflux disease with esophagitis   • Palpitations   • Dyskinesia of esophagus   • Ventricular premature beats   • Recurrent herpes labialis   • Hypothyroidism   • Dizziness   • Bilateral shoulder pain   • Bursitis/tendonitis, shoulder   • Annual physical exam   • Depression with anxiety   • Dysphagia   • Osteopenia of both thighs   • Hiatal hernia   • OAB (overactive bladder)   • Pulmonary nodule, right   • Umbilical hernia  with obstruction, without gangrene   • Carpal tunnel syndrome on both sides       Current Outpatient Medications on File Prior to Visit   Medication Sig   • acyclovir (ZOVIRAX) 400 MG tablet Take 1 tablet by mouth Daily. Take no more than 5 doses a day.   • aluminum hydroxide-mag carbonate (GAVISCON EXTRA RELIEF) 160-105 MG chewable tablet chewable tablet Chew 1-2 tablets As Needed.   • buPROPion XL (WELLBUTRIN XL) 300 MG 24 hr tablet Take 1 tablet by mouth Every Morning.   • CBD oil (cannabidiol) capsule Take  by mouth.   • dexlansoprazole (DEXILANT) 60 MG capsule Take 60 mg by mouth Every Evening.   • escitalopram (LEXAPRO) 10 MG tablet TAKE ONE TABLET BY MOUTH DAILY   • ibuprofen (ADVIL,MOTRIN) 200 MG tablet Take 600 mg by mouth Every 6 (Six) Hours As Needed for Mild Pain .   • levothyroxine (SYNTHROID, LEVOTHROID) 125 MCG tablet Take 1 tablet by mouth Daily.   • metoprolol tartrate (LOPRESSOR) 25 MG tablet TAKE ONE TABLET BY MOUTH TWICE A DAY   • MULTIPLE VITAMIN PO Take 1 tablet by mouth Daily.   • oxybutynin XL (DITROPAN-XL) 5 MG 24 hr tablet TAKE ONE TABLET BY MOUTH DAILY   • ranitidine (ZANTAC) 150 MG tablet Take 150 mg by mouth As Needed for Heartburn or Indigestion.   • traZODone (DESYREL) 100 MG tablet TAKE ONE TABLET BY MOUTH EVERY NIGHT AT BEDTIME   • [DISCONTINUED] ondansetron (Zofran) 4 MG tablet Take 1 tablet by mouth Every 6 (Six) Hours As Needed for Nausea or Vomiting for up to 10 doses.     No current facility-administered medications on file prior to visit.          The following portions of the patient's history were reviewed and updated as appropriate: allergies, current medications, past family history, past medical history, past social history, past surgical history and problem list.    Review of Systems   Constitutional: Positive for fatigue. Negative for unexpected weight gain and unexpected weight loss.   Musculoskeletal: Positive for arthralgias, gait problem and myalgias.       Objective  "  Vitals:    03/08/21 1108   BP: 126/76   BP Location: Right arm   Patient Position: Sitting   Cuff Size: Adult   Pulse: 76   Resp: 16   Temp: 97.8 °F (36.6 °C)   SpO2: 98%   Weight: 80.6 kg (177 lb 12.8 oz)   Height: 152.4 cm (60\")       BP Readings from Last 3 Encounters:   03/08/21 126/76   11/23/20 120/80   07/31/20 119/61        Wt Readings from Last 3 Encounters:   03/08/21 80.6 kg (177 lb 12.8 oz)   11/23/20 81.2 kg (179 lb)   08/06/20 79.6 kg (175 lb 6.4 oz)        Body mass index is 34.72 kg/m².  Nursing notes and vitals reviewed.    Physical Exam  Vitals and nursing note reviewed.   Constitutional:       General: She is not in acute distress.     Appearance: She is obese. She is not ill-appearing.   HENT:      Head: Normocephalic and atraumatic.      Mouth/Throat:      Mouth: Mucous membranes are moist.   Eyes:      Extraocular Movements: Extraocular movements intact.      Conjunctiva/sclera: Conjunctivae normal.   Pulmonary:      Effort: Pulmonary effort is normal. No respiratory distress.   Musculoskeletal:      Right shoulder: Normal range of motion ( + lift off test).      Left shoulder: Normal range of motion ( + lift off test).      Right wrist: No swelling, deformity, effusion, tenderness or bony tenderness. Normal range of motion.      Left wrist: No swelling, deformity, effusion, tenderness or bony tenderness. Normal range of motion.      Right hand: Normal. No swelling or tenderness. Normal range of motion.      Left hand: Normal. No swelling or tenderness. Normal range of motion.      Cervical back: Neck supple. No rigidity.      Right lower leg: No edema.      Left lower leg: No edema.      Comments: + tinels and phalens at bilateral wrists   Skin:     General: Skin is warm and dry.   Neurological:      General: No focal deficit present.      Mental Status: She is alert and oriented to person, place, and time.   Psychiatric:         Mood and Affect: Mood normal.         Behavior: Behavior " normal.         No results found for this or any previous visit (from the past 672 hour(s)).      Assessment/Plan   Diagnoses and all orders for this visit:    1. Arthralgia of both hands (Primary)  -     CBC & Differential  -     Comprehensive Metabolic Panel  -     C-reactive Protein  -     Sedimentation Rate  -     Rheumatoid Factor, Quant  -     Cyclic Citrul Peptide Antibody, IgG / IgA  -     KAVITA Direct Reflex to 11 Biomarker  -     XR Hand 3+ View Left; Future  -     XR Hand 3+ View Right; Future    2. Chronic pain of both knees  -     XR Knee 3 View Bilateral; Future    3. Chronic pain of both hips  -     XR Hips Bilateral With or Without Pelvis 2 View; Future    4. Chronic pain of both shoulders  -     XR Shoulder 2+ View Left; Future  -     XR Shoulder 1 View Right; Future  -     Ambulatory Referral to Physical Therapy Evaluate and treat    5. Carpal tunnel syndrome on both sides    6. Chronic fatigue    7. Hypothyroidism, unspecified type        No evidence of synovitis on exam.  ROM normal in hands/wrists.  I don't think this is an inflammatory arthritis.  Exam and history more c/w OA; also carpel tunnel.  Strong family history of OA and joint replacement in parents.  Check labs and x-rays.  She may benefit from steroid injections in the knees.  PT for the shoulders.  Discussed wrist splints and activity modification for the carpal tunnel. She sees Dr. Jarvis for feet pain and I recommended she see him again for possible injections or custom orthotics.  Add Tylenol Arthritis; (she doesn't tolerate NSAIDs).  Needs increase in physical activity and she is looking into water aerobics.    Fatigue.  Hypothyroidism.  We decreased the dose of levothyroxine a few months ago and we'll recheck levels today.  Also check vit D, B12, ferritin.    Medications, including side effects, were discussed with the patient. Patient verbalized understanding.  The plan of care was discussed. All questions were answered.  Patient verbalized understanding.      Return in about 4 weeks (around 4/5/2021).

## 2021-03-09 ENCOUNTER — HOSPITAL ENCOUNTER (OUTPATIENT)
Dept: GENERAL RADIOLOGY | Facility: HOSPITAL | Age: 56
Discharge: HOME | End: 2021-03-09

## 2021-03-09 DIAGNOSIS — M25.512 CHRONIC PAIN OF BOTH SHOULDERS: ICD-10-CM

## 2021-03-09 DIAGNOSIS — M25.542 ARTHRALGIA OF BOTH HANDS: ICD-10-CM

## 2021-03-09 DIAGNOSIS — M25.561 CHRONIC PAIN OF BOTH KNEES: ICD-10-CM

## 2021-03-09 DIAGNOSIS — G89.29 CHRONIC PAIN OF BOTH KNEES: ICD-10-CM

## 2021-03-09 DIAGNOSIS — M25.511 CHRONIC PAIN OF BOTH SHOULDERS: ICD-10-CM

## 2021-03-09 DIAGNOSIS — M25.551 CHRONIC PAIN OF BOTH HIPS: ICD-10-CM

## 2021-03-09 DIAGNOSIS — M25.541 ARTHRALGIA OF BOTH HANDS: ICD-10-CM

## 2021-03-09 DIAGNOSIS — G89.29 CHRONIC PAIN OF BOTH SHOULDERS: ICD-10-CM

## 2021-03-09 DIAGNOSIS — M25.562 CHRONIC PAIN OF BOTH KNEES: ICD-10-CM

## 2021-03-09 DIAGNOSIS — M25.552 CHRONIC PAIN OF BOTH HIPS: ICD-10-CM

## 2021-03-09 DIAGNOSIS — G89.29 CHRONIC PAIN OF BOTH HIPS: ICD-10-CM

## 2021-03-09 PROCEDURE — 73030 X-RAY EXAM OF SHOULDER: CPT

## 2021-03-09 PROCEDURE — 73521 X-RAY EXAM HIPS BI 2 VIEWS: CPT

## 2021-03-09 PROCEDURE — 73130 X-RAY EXAM OF HAND: CPT

## 2021-03-09 PROCEDURE — 73562 X-RAY EXAM OF KNEE 3: CPT

## 2021-03-10 DIAGNOSIS — M79.7 FIBROMYALGIA: Primary | ICD-10-CM

## 2021-03-10 LAB
25(OH)D3+25(OH)D2 SERPL-MCNC: 46.8 NG/ML (ref 30–100)
ALBUMIN SERPL-MCNC: 4.2 G/DL (ref 3.5–5.2)
ALBUMIN/GLOB SERPL: 1.9 G/DL
ALP SERPL-CCNC: 79 U/L (ref 39–117)
ALT SERPL-CCNC: 16 U/L (ref 1–33)
ANA SER QL: NEGATIVE
AST SERPL-CCNC: 19 U/L (ref 1–32)
BASOPHILS # BLD AUTO: 0.05 10*3/MM3 (ref 0–0.2)
BASOPHILS NFR BLD AUTO: 0.9 % (ref 0–1.5)
BILIRUB SERPL-MCNC: 0.4 MG/DL (ref 0–1.2)
BUN SERPL-MCNC: 16 MG/DL (ref 6–20)
BUN/CREAT SERPL: 17.6 (ref 7–25)
CALCIUM SERPL-MCNC: 9.4 MG/DL (ref 8.6–10.5)
CCP IGA+IGG SERPL IA-ACNC: 4 UNITS (ref 0–19)
CHLORIDE SERPL-SCNC: 101 MMOL/L (ref 98–107)
CO2 SERPL-SCNC: 26.1 MMOL/L (ref 22–29)
CREAT SERPL-MCNC: 0.91 MG/DL (ref 0.57–1)
CRP SERPL-MCNC: <0.3 MG/DL (ref 0–0.5)
EOSINOPHIL # BLD AUTO: 0.16 10*3/MM3 (ref 0–0.4)
EOSINOPHIL NFR BLD AUTO: 3 % (ref 0.3–6.2)
ERYTHROCYTE [DISTWIDTH] IN BLOOD BY AUTOMATED COUNT: 12.5 % (ref 12.3–15.4)
ERYTHROCYTE [SEDIMENTATION RATE] IN BLOOD BY WESTERGREN METHOD: 4 MM/HR (ref 0–30)
FERRITIN SERPL-MCNC: 142 NG/ML (ref 13–150)
GLOBULIN SER CALC-MCNC: 2.2 GM/DL
GLUCOSE SERPL-MCNC: 88 MG/DL (ref 65–99)
HCT VFR BLD AUTO: 40.9 % (ref 34–46.6)
HGB BLD-MCNC: 13.9 G/DL (ref 12–15.9)
IMM GRANULOCYTES # BLD AUTO: 0.01 10*3/MM3 (ref 0–0.05)
IMM GRANULOCYTES NFR BLD AUTO: 0.2 % (ref 0–0.5)
LYMPHOCYTES # BLD AUTO: 1.38 10*3/MM3 (ref 0.7–3.1)
LYMPHOCYTES NFR BLD AUTO: 26.1 % (ref 19.6–45.3)
MCH RBC QN AUTO: 32.7 PG (ref 26.6–33)
MCHC RBC AUTO-ENTMCNC: 34 G/DL (ref 31.5–35.7)
MCV RBC AUTO: 96.2 FL (ref 79–97)
MONOCYTES # BLD AUTO: 0.41 10*3/MM3 (ref 0.1–0.9)
MONOCYTES NFR BLD AUTO: 7.8 % (ref 5–12)
NEUTROPHILS # BLD AUTO: 3.28 10*3/MM3 (ref 1.7–7)
NEUTROPHILS NFR BLD AUTO: 62 % (ref 42.7–76)
NRBC BLD AUTO-RTO: 0 /100 WBC (ref 0–0.2)
PLATELET # BLD AUTO: 241 10*3/MM3 (ref 140–450)
POTASSIUM SERPL-SCNC: 4.9 MMOL/L (ref 3.5–5.2)
PROT SERPL-MCNC: 6.4 G/DL (ref 6–8.5)
RBC # BLD AUTO: 4.25 10*6/MM3 (ref 3.77–5.28)
RHEUMATOID FACT SERPL-ACNC: <10 IU/ML (ref 0–13.9)
SODIUM SERPL-SCNC: 137 MMOL/L (ref 136–145)
T4 FREE SERPL-MCNC: 1.7 NG/DL (ref 0.93–1.7)
TSH SERPL DL<=0.005 MIU/L-ACNC: 0.63 UIU/ML (ref 0.27–4.2)
VIT B12 SERPL-MCNC: 412 PG/ML (ref 211–946)
WBC # BLD AUTO: 5.29 10*3/MM3 (ref 3.4–10.8)

## 2021-03-10 RX ORDER — PREGABALIN 75 MG/1
75 CAPSULE ORAL 2 TIMES DAILY
Qty: 120 CAPSULE | Refills: 5 | Status: SHIPPED | OUTPATIENT
Start: 2021-03-10 | End: 2021-06-07

## 2021-03-11 DIAGNOSIS — E03.9 HYPOTHYROIDISM, UNSPECIFIED TYPE: ICD-10-CM

## 2021-03-11 RX ORDER — LEVOTHYROXINE SODIUM 0.12 MG/1
TABLET ORAL
Qty: 30 TABLET | Refills: 1 | Status: SHIPPED | OUTPATIENT
Start: 2021-03-11 | End: 2021-05-06

## 2021-03-17 RX ORDER — GABAPENTIN 100 MG/1
100 CAPSULE ORAL 2 TIMES DAILY
Qty: 50 CAPSULE | Refills: 5 | Status: SHIPPED | OUTPATIENT
Start: 2021-03-17 | End: 2021-09-01 | Stop reason: SDUPTHER

## 2021-03-25 RX ORDER — TRAZODONE HYDROCHLORIDE 100 MG/1
TABLET ORAL
Qty: 30 TABLET | Refills: 1 | Status: SHIPPED | OUTPATIENT
Start: 2021-03-25 | End: 2021-08-05

## 2021-05-05 DIAGNOSIS — E03.9 HYPOTHYROIDISM, UNSPECIFIED TYPE: ICD-10-CM

## 2021-05-06 RX ORDER — LEVOTHYROXINE SODIUM 0.12 MG/1
TABLET ORAL
Qty: 90 TABLET | Refills: 1 | Status: SHIPPED | OUTPATIENT
Start: 2021-05-06 | End: 2021-11-18 | Stop reason: SDUPTHER

## 2021-05-06 RX ORDER — OXYBUTYNIN CHLORIDE 5 MG/1
TABLET, EXTENDED RELEASE ORAL
Qty: 90 TABLET | Refills: 1 | Status: SHIPPED | OUTPATIENT
Start: 2021-05-06 | End: 2021-11-18 | Stop reason: SDUPTHER

## 2021-05-25 ENCOUNTER — HOSPITAL ENCOUNTER (EMERGENCY)
Facility: HOSPITAL | Age: 56
Discharge: HOME | End: 2021-05-25
Attending: EMERGENCY MEDICINE | Admitting: EMERGENCY MEDICINE

## 2021-05-25 ENCOUNTER — APPOINTMENT (OUTPATIENT)
Dept: GENERAL RADIOLOGY | Facility: HOSPITAL | Age: 56
End: 2021-05-25

## 2021-05-25 VITALS
SYSTOLIC BLOOD PRESSURE: 117 MMHG | HEIGHT: 60 IN | TEMPERATURE: 99.7 F | WEIGHT: 170 LBS | DIASTOLIC BLOOD PRESSURE: 52 MMHG | OXYGEN SATURATION: 92 % | RESPIRATION RATE: 22 BRPM | BODY MASS INDEX: 33.38 KG/M2 | HEART RATE: 75 BPM

## 2021-05-25 DIAGNOSIS — J18.9 PNEUMONIA OF LOWER LOBE DUE TO INFECTIOUS ORGANISM, UNSPECIFIED LATERALITY: Primary | ICD-10-CM

## 2021-05-25 LAB
ALBUMIN SERPL-MCNC: 3.9 G/DL (ref 3.5–5.2)
ALBUMIN/GLOB SERPL: 1.3 G/DL
ALP SERPL-CCNC: 117 U/L (ref 39–117)
ALT SERPL W P-5'-P-CCNC: 28 U/L (ref 1–33)
ANION GAP SERPL CALCULATED.3IONS-SCNC: 11.1 MMOL/L (ref 5–15)
AST SERPL-CCNC: 26 U/L (ref 1–32)
BACTERIA UR QL AUTO: ABNORMAL /HPF
BASOPHILS # BLD AUTO: 0.04 10*3/MM3 (ref 0–0.2)
BASOPHILS NFR BLD AUTO: 0.4 % (ref 0–1.5)
BILIRUB SERPL-MCNC: 0.6 MG/DL (ref 0–1.2)
BILIRUB UR QL STRIP: NEGATIVE
BUN SERPL-MCNC: 5 MG/DL (ref 6–20)
BUN/CREAT SERPL: 7.2 (ref 7–25)
CALCIUM SPEC-SCNC: 9.2 MG/DL (ref 8.6–10.5)
CHLORIDE SERPL-SCNC: 97 MMOL/L (ref 98–107)
CLARITY UR: CLEAR
CO2 SERPL-SCNC: 23.9 MMOL/L (ref 22–29)
COLOR UR: YELLOW
CREAT SERPL-MCNC: 0.69 MG/DL (ref 0.57–1)
DEPRECATED RDW RBC AUTO: 42.1 FL (ref 37–54)
EOSINOPHIL # BLD AUTO: 0.01 10*3/MM3 (ref 0–0.4)
EOSINOPHIL NFR BLD AUTO: 0.1 % (ref 0.3–6.2)
ERYTHROCYTE [DISTWIDTH] IN BLOOD BY AUTOMATED COUNT: 12 % (ref 12.3–15.4)
FLUAV RNA RESP QL NAA+PROBE: NOT DETECTED
FLUBV RNA RESP QL NAA+PROBE: NOT DETECTED
GFR SERPL CREATININE-BSD FRML MDRD: 88 ML/MIN/1.73
GLOBULIN UR ELPH-MCNC: 3.1 GM/DL
GLUCOSE SERPL-MCNC: 114 MG/DL (ref 65–99)
GLUCOSE UR STRIP-MCNC: NEGATIVE MG/DL
HCT VFR BLD AUTO: 39.4 % (ref 34–46.6)
HGB BLD-MCNC: 13.5 G/DL (ref 12–15.9)
HGB UR QL STRIP.AUTO: ABNORMAL
HYALINE CASTS UR QL AUTO: ABNORMAL /LPF
IMM GRANULOCYTES # BLD AUTO: 0.03 10*3/MM3 (ref 0–0.05)
IMM GRANULOCYTES NFR BLD AUTO: 0.3 % (ref 0–0.5)
KETONES UR QL STRIP: ABNORMAL
LEUKOCYTE ESTERASE UR QL STRIP.AUTO: NEGATIVE
LYMPHOCYTES # BLD AUTO: 1.05 10*3/MM3 (ref 0.7–3.1)
LYMPHOCYTES NFR BLD AUTO: 11 % (ref 19.6–45.3)
MCH RBC QN AUTO: 32.8 PG (ref 26.6–33)
MCHC RBC AUTO-ENTMCNC: 34.3 G/DL (ref 31.5–35.7)
MCV RBC AUTO: 95.6 FL (ref 79–97)
MONOCYTES # BLD AUTO: 0.84 10*3/MM3 (ref 0.1–0.9)
MONOCYTES NFR BLD AUTO: 8.8 % (ref 5–12)
NEUTROPHILS NFR BLD AUTO: 7.59 10*3/MM3 (ref 1.7–7)
NEUTROPHILS NFR BLD AUTO: 79.4 % (ref 42.7–76)
NITRITE UR QL STRIP: NEGATIVE
NRBC BLD AUTO-RTO: 0 /100 WBC (ref 0–0.2)
PH UR STRIP.AUTO: 7 [PH] (ref 4.5–8)
PLATELET # BLD AUTO: 213 10*3/MM3 (ref 140–450)
PMV BLD AUTO: 12 FL (ref 6–12)
POTASSIUM SERPL-SCNC: 3.9 MMOL/L (ref 3.5–5.2)
PROCALCITONIN SERPL-MCNC: 0.09 NG/ML (ref 0–0.25)
PROT SERPL-MCNC: 7 G/DL (ref 6–8.5)
PROT UR QL STRIP: NEGATIVE
RBC # BLD AUTO: 4.12 10*6/MM3 (ref 3.77–5.28)
RBC # UR: ABNORMAL /HPF
REF LAB TEST METHOD: ABNORMAL
S PYO AG THROAT QL: NEGATIVE
SARS-COV-2 RNA RESP QL NAA+PROBE: NOT DETECTED
SODIUM SERPL-SCNC: 132 MMOL/L (ref 136–145)
SP GR UR STRIP: 1.01 (ref 1–1.03)
SQUAMOUS #/AREA URNS HPF: ABNORMAL /HPF
UROBILINOGEN UR QL STRIP: ABNORMAL
WBC # BLD AUTO: 9.56 10*3/MM3 (ref 3.4–10.8)
WBC UR QL AUTO: ABNORMAL /HPF

## 2021-05-25 PROCEDURE — 84145 PROCALCITONIN (PCT): CPT | Performed by: EMERGENCY MEDICINE

## 2021-05-25 PROCEDURE — 99283 EMERGENCY DEPT VISIT LOW MDM: CPT | Performed by: EMERGENCY MEDICINE

## 2021-05-25 PROCEDURE — 71046 X-RAY EXAM CHEST 2 VIEWS: CPT

## 2021-05-25 PROCEDURE — 87636 SARSCOV2 & INF A&B AMP PRB: CPT | Performed by: EMERGENCY MEDICINE

## 2021-05-25 PROCEDURE — 99283 EMERGENCY DEPT VISIT LOW MDM: CPT

## 2021-05-25 PROCEDURE — 87081 CULTURE SCREEN ONLY: CPT | Performed by: EMERGENCY MEDICINE

## 2021-05-25 PROCEDURE — 81001 URINALYSIS AUTO W/SCOPE: CPT | Performed by: EMERGENCY MEDICINE

## 2021-05-25 PROCEDURE — 85025 COMPLETE CBC W/AUTO DIFF WBC: CPT | Performed by: EMERGENCY MEDICINE

## 2021-05-25 PROCEDURE — 87880 STREP A ASSAY W/OPTIC: CPT | Performed by: EMERGENCY MEDICINE

## 2021-05-25 PROCEDURE — 80053 COMPREHEN METABOLIC PANEL: CPT | Performed by: EMERGENCY MEDICINE

## 2021-05-25 RX ORDER — BENZONATATE 100 MG/1
100 CAPSULE ORAL 3 TIMES DAILY PRN
Qty: 30 CAPSULE | Refills: 0 | Status: SHIPPED | OUTPATIENT
Start: 2021-05-25 | End: 2021-06-07

## 2021-05-25 RX ORDER — FAMOTIDINE 10 MG
10 TABLET ORAL 2 TIMES DAILY
COMMUNITY
End: 2021-12-16

## 2021-05-25 RX ORDER — CEFDINIR 300 MG/1
300 CAPSULE ORAL EVERY 12 HOURS SCHEDULED
Status: DISCONTINUED | OUTPATIENT
Start: 2021-05-25 | End: 2021-05-25 | Stop reason: HOSPADM

## 2021-05-25 RX ORDER — FLUCONAZOLE 200 MG/1
200 TABLET ORAL DAILY
Qty: 1 TABLET | Refills: 0 | Status: SHIPPED | OUTPATIENT
Start: 2021-05-25 | End: 2021-05-26

## 2021-05-25 RX ORDER — ACETAMINOPHEN 500 MG
1000 TABLET ORAL ONCE
Status: COMPLETED | OUTPATIENT
Start: 2021-05-25 | End: 2021-05-25

## 2021-05-25 RX ORDER — CEFDINIR 300 MG/1
300 CAPSULE ORAL 2 TIMES DAILY
Qty: 20 CAPSULE | Refills: 0 | Status: SHIPPED | OUTPATIENT
Start: 2021-05-25 | End: 2021-06-04

## 2021-05-25 RX ORDER — ONDANSETRON 8 MG/1
8 TABLET, ORALLY DISINTEGRATING ORAL EVERY 8 HOURS PRN
Qty: 10 TABLET | Refills: 0 | Status: SHIPPED | OUTPATIENT
Start: 2021-05-25 | End: 2021-06-07

## 2021-05-25 RX ADMIN — CEFDINIR 300 MG: 300 CAPSULE ORAL at 21:17

## 2021-05-25 RX ADMIN — ACETAMINOPHEN 1000 MG: 500 TABLET, FILM COATED ORAL at 19:32

## 2021-05-27 ENCOUNTER — TELEPHONE (OUTPATIENT)
Dept: INTERNAL MEDICINE | Facility: CLINIC | Age: 56
End: 2021-05-27

## 2021-05-27 LAB — BACTERIA SPEC AEROBE CULT: NORMAL

## 2021-05-27 NOTE — TELEPHONE ENCOUNTER
Caller: Justus Mcgarry    Relationship: Emergency Contact    Best call back number:[phone] (M)    What is the best time to reach you: ANYTIME    Who are you requesting to speak with: ANYONE  Do you know the name of the person who called: PATIENTS      What was the call regarding: PATIENTS  CALLED IN STATING THE PATIENT WAS SEEN AT THE ER ON 5/25/21. THEY DIAGNOSED HER WITH PNEUMONIA AND A UTI. PATIENT WAS GIVEN MEDICATION, BUT SHE IS UNABLE TO KEEP ANYTHING DOWN, AND STILL HAS THE FEVER OFF AND ON. PATIENTS  STATES THE PATIENT WOULD LIKE TO KNOW WHAT SHE SHOULD DO. DIDN'T KNOW IF SHE NEEDED TO GO BACK TO THE EMERGENCY ROOM OR NOT. OFFERED TO SCHEDULE PATIENT AND APPOINTMENT BUT PATIENT TOLD  SHE WOULD LIKE TO WAIT TO SEE WHAT MD JULIETTE BEE WOULD LIKE HER TO DO. PLEASE ADVISE, THANK YOU.    Do you require a callback: YES

## 2021-05-27 NOTE — TELEPHONE ENCOUNTER
Caller: Justus Mcgarry    Relationship to patient: Emergency Contact    Best call back number: [phone] (M)  Chief complaint:PATIENTS  CALLED IN STATING THE PATIENT WAS SEEN AT THE ER ON 5/25/21. THEY DIAGNOSED HER WITH PNEUMONIA AND A UTI. NOTHING AVAILABLE IN THE NEXT 7 DAYS WITH HER DOCTOR OF A ANRP. PATIENT WOULD LIKE A MORNING APPOINTMENT. PLEASE CALL PATIENT TO SCHEDULE     Type of visit: HOSPITAL FOLLOW UP    Requested date: 05/27/21

## 2021-06-02 ENCOUNTER — TELEPHONE (OUTPATIENT)
Dept: INTERNAL MEDICINE | Facility: CLINIC | Age: 56
End: 2021-06-02

## 2021-06-02 ENCOUNTER — TRANSITIONAL CARE MANAGEMENT TELEPHONE ENCOUNTER (OUTPATIENT)
Dept: CALL CENTER | Facility: HOSPITAL | Age: 56
End: 2021-06-02

## 2021-06-02 ENCOUNTER — READMISSION MANAGEMENT (OUTPATIENT)
Dept: CALL CENTER | Facility: HOSPITAL | Age: 56
End: 2021-06-02

## 2021-06-02 NOTE — OUTREACH NOTE
Call Center TCM Note      Responses   [facility], [place] patient discharged from?  Non-BH   Does the patient have one of the following disease processes/diagnoses(primary or secondary)?  Other   TCM attempt successful?  No   Unsuccessful attempts  Attempt 2          Brianna Royal LPN    6/2/2021, 15:28 EDT

## 2021-06-02 NOTE — OUTREACH NOTE
Prep Survey      Responses   [facility]?  [facility]   Is LACE score < 7 ?  Non-BH Discharge   Emergency Room discharge w/ pulse ox?  No   Eligibility  [facility]   Date of Discharge  06/01/21   Discharge diagnosis  bronchitis   Does the patient have one of the following disease processes/diagnoses(primary or secondary)?  Other   Prep survey completed?  Yes          Irina Mejia RN

## 2021-06-02 NOTE — OUTREACH NOTE
Call Center TCM Note      Responses   [facility], [place] patient discharged from?  Non-BH   Does the patient have one of the following disease processes/diagnoses(primary or secondary)?  Other   TCM attempt successful?  No   Unsuccessful attempts  Attempt 1          Sultana Camilo MA    6/2/2021, 14:55 EDT

## 2021-06-02 NOTE — TELEPHONE ENCOUNTER
Caller: Aundrea Mcgarry    Relationship to patient: Self    Best call back number: [phone]    New or established patient?  [] New  [x] Established    Date of discharge: 06/01/2021    Facility discharged from: [facility]    Diagnosis/Symptoms: BRONCHITIS    Length of stay (If applicable): ONE WEEK    HOSPITAL FOLLOW UP APPT ON 6/7/21.

## 2021-06-03 ENCOUNTER — TRANSITIONAL CARE MANAGEMENT TELEPHONE ENCOUNTER (OUTPATIENT)
Dept: CALL CENTER | Facility: HOSPITAL | Age: 56
End: 2021-06-03

## 2021-06-03 NOTE — OUTREACH NOTE
Call Center TCM Note      Responses   [facility] patient discharged from?  Non-BH   Does the patient have one of the following disease processes/diagnoses(primary or secondary)?  Other   TCM attempt successful?  No   Unsuccessful attempts  Attempt 3          Karlie Cruz LPN    6/3/2021, 14:34 EDT

## 2021-06-07 ENCOUNTER — OFFICE VISIT (OUTPATIENT)
Dept: INTERNAL MEDICINE | Facility: CLINIC | Age: 56
End: 2021-06-07

## 2021-06-07 VITALS
HEART RATE: 65 BPM | HEIGHT: 60 IN | RESPIRATION RATE: 17 BRPM | SYSTOLIC BLOOD PRESSURE: 125 MMHG | DIASTOLIC BLOOD PRESSURE: 60 MMHG | OXYGEN SATURATION: 98 % | BODY MASS INDEX: 33.77 KG/M2 | WEIGHT: 172 LBS | TEMPERATURE: 96.4 F

## 2021-06-07 DIAGNOSIS — J18.9 PNEUMONIA DUE TO INFECTIOUS ORGANISM, UNSPECIFIED LATERALITY, UNSPECIFIED PART OF LUNG: Primary | ICD-10-CM

## 2021-06-07 PROCEDURE — 99213 OFFICE O/P EST LOW 20 MIN: CPT | Performed by: FAMILY MEDICINE

## 2021-06-07 RX ORDER — AMOXICILLIN AND CLAVULANATE POTASSIUM 875; 125 MG/1; MG/1
1 TABLET, FILM COATED ORAL
COMMUNITY
Start: 2021-06-01 | End: 2021-06-07

## 2021-06-07 RX ORDER — FLUCONAZOLE 200 MG/1
TABLET ORAL
COMMUNITY
Start: 2021-05-26 | End: 2021-06-07

## 2021-06-07 NOTE — PROGRESS NOTES
"Subjective   Aundrea Mcgarry is a 55 y.o. female presenting today for follow up of   Chief Complaint   Patient presents with   • Hospital Follow Up Visit     pneumonia   • Pneumonia       History of Present Illness     Pt here for hospital follow-up.  She was admitted to [facility] 5/27-6/2/21 with pneumonia, left mid lobe.  She didn't require oxygen therapy but required IV antibiotics and IV fluids due to nausea and vomiting. She has completed a 7 day course of Augmentin and doxycycline.  She reports that the cough has resolved.  The shortness of breath has improved.  She gets \"a little winded\" when she exerts herself, but this is improving.  She has left flank pain.  She thinks it's a rib injury and it's tender.  It improves with moving around.  Appetite is improved.      Patient Active Problem List   Diagnosis   • Abdominal pain   • Arthritis   • Bunion   • Gastroesophageal reflux disease with esophagitis   • Palpitations   • Dyskinesia of esophagus   • Ventricular premature beats   • Recurrent herpes labialis   • Hypothyroidism   • Dizziness   • Bilateral shoulder pain   • Bursitis/tendonitis, shoulder   • Annual physical exam   • Depression with anxiety   • Dysphagia   • Osteopenia of both thighs   • Hiatal hernia   • OAB (overactive bladder)   • Pulmonary nodule, right   • Umbilical hernia with obstruction, without gangrene   • Carpal tunnel syndrome on both sides       Current Outpatient Medications on File Prior to Visit   Medication Sig   • acyclovir (ZOVIRAX) 400 MG tablet Take 1 tablet by mouth Daily. Take no more than 5 doses a day.   • aluminum hydroxide-mag carbonate (GAVISCON EXTRA RELIEF) 160-105 MG chewable tablet chewable tablet Chew 1-2 tablets As Needed.   • buPROPion XL (WELLBUTRIN XL) 300 MG 24 hr tablet Take 1 tablet by mouth Every Morning.   • dexlansoprazole (DEXILANT) 60 MG capsule Take 60 mg by mouth Every Evening.   • escitalopram (LEXAPRO) 10 MG tablet TAKE ONE TABLET BY MOUTH " DAILY   • famotidine (PEPCID) 10 MG tablet Take 10 mg by mouth 2 (Two) Times a Day.   • gabapentin (NEURONTIN) 100 MG capsule Take 1 capsule by mouth 2 (two) times a day. Start with 1 capsule nightly X 2 weeks, then increase to 1 capsule BID.   • ibuprofen (ADVIL,MOTRIN) 200 MG tablet Take 600 mg by mouth Every 6 (Six) Hours As Needed for Mild Pain .   • levothyroxine (SYNTHROID, LEVOTHROID) 125 MCG tablet TAKE ONE TABLET BY MOUTH DAILY   • metoprolol tartrate (LOPRESSOR) 25 MG tablet Take 1 tablet by mouth 2 (Two) Times a Day.   • MULTIPLE VITAMIN PO Take 1 tablet by mouth Daily.   • oxybutynin XL (DITROPAN-XL) 5 MG 24 hr tablet TAKE ONE TABLET BY MOUTH DAILY   • traZODone (DESYREL) 100 MG tablet TAKE ONE TABLET BY MOUTH EVERY NIGHT AT BEDTIME   • [DISCONTINUED] amoxicillin-clavulanate (AUGMENTIN) 875-125 MG per tablet Take 1 tablet by mouth.   • [DISCONTINUED] benzonatate (TESSALON) 100 MG capsule Take 1 capsule by mouth 3 (Three) Times a Day As Needed for Cough for up to 30 doses.   • [DISCONTINUED] CBD oil (cannabidiol) capsule Take  by mouth.   • [DISCONTINUED] fluconazole (DIFLUCAN) 200 MG tablet    • [DISCONTINUED] ondansetron ODT (Zofran ODT) 8 MG disintegrating tablet Place 1 tablet on the tongue Every 8 (Eight) Hours As Needed for Nausea or Vomiting.   • [DISCONTINUED] pregabalin (Lyrica) 75 MG capsule Take 1 capsule by mouth 2 (Two) Times a Day. Start 75 mg BID x 1 week, then 75 mg a.m. and 150 mg p.m. X 1 week, then 150 mg BID thereafter.   • [DISCONTINUED] ranitidine (ZANTAC) 150 MG tablet Take 150 mg by mouth As Needed for Heartburn or Indigestion.     No current facility-administered medications on file prior to visit.          The following portions of the patient's history were reviewed and updated as appropriate: allergies, current medications, past family history, past medical history, past social history, past surgical history and problem list.    Review of Systems   Constitutional: Negative.   "  HENT: Negative.    Eyes: Negative.    Respiratory: Positive for shortness of breath (mild, improved).    Cardiovascular: Negative.    Gastrointestinal: Negative.    Endocrine: Negative.    Genitourinary: Negative.    Skin: Negative.    Neurological: Negative.    Hematological: Negative.    Psychiatric/Behavioral: Negative.        Objective   Vitals:    06/07/21 1427   BP: 125/60   Pulse: 65   Resp: 17   Temp: 96.4 °F (35.8 °C)   TempSrc: Infrared   SpO2: 98%   Weight: 78 kg (172 lb)   Height: 152.4 cm (60\")       BP Readings from Last 3 Encounters:   06/07/21 125/60   05/25/21 117/52   03/08/21 126/76        Wt Readings from Last 3 Encounters:   06/07/21 78 kg (172 lb)   05/25/21 77.1 kg (170 lb)   03/08/21 80.6 kg (177 lb 12.8 oz)        Body mass index is 33.59 kg/m².  Nursing notes and vitals reviewed.    Physical Exam  Vitals and nursing note reviewed.   Constitutional:       General: She is not in acute distress.     Appearance: Normal appearance. She is not ill-appearing.   HENT:      Head: Normocephalic and atraumatic.      Mouth/Throat:      Mouth: Mucous membranes are moist.   Eyes:      Extraocular Movements: Extraocular movements intact.      Conjunctiva/sclera: Conjunctivae normal.   Cardiovascular:      Rate and Rhythm: Normal rate and regular rhythm.      Heart sounds: Normal heart sounds.   Pulmonary:      Effort: Pulmonary effort is normal.      Breath sounds: Rales (left mid, a few scattered.) present.   Musculoskeletal:      Cervical back: Neck supple. No rigidity.      Right lower leg: No edema.      Left lower leg: No edema.   Neurological:      General: No focal deficit present.      Mental Status: She is alert and oriented to person, place, and time.   Psychiatric:         Mood and Affect: Mood normal.         Behavior: Behavior normal.         Recent Results (from the past 672 hour(s))   Rapid Strep A Screen - Swab, Throat    Collection Time: 05/25/21  7:50 PM    Specimen: Throat; Swab "   Result Value Ref Range    Strep A Ag Negative Negative   Beta Strep Culture, Throat - Swab, Throat    Collection Time: 05/25/21  7:50 PM    Specimen: Throat; Swab   Result Value Ref Range    Throat Culture, Beta Strep No Beta Hemolytic Streptococcus Isolated    COVID-19 and FLU A/B PCR - Swab, Nasopharynx    Collection Time: 05/25/21  7:51 PM    Specimen: Nasopharynx; Swab   Result Value Ref Range    COVID19 Not Detected Not Detected - Ref. Range    Influenza A PCR Not Detected Not Detected    Influenza B PCR Not Detected Not Detected   Comprehensive Metabolic Panel    Collection Time: 05/25/21  7:51 PM    Specimen: Blood   Result Value Ref Range    Glucose 114 (H) 65 - 99 mg/dL    BUN 5 (L) 6 - 20 mg/dL    Creatinine 0.69 0.57 - 1.00 mg/dL    Sodium 132 (L) 136 - 145 mmol/L    Potassium 3.9 3.5 - 5.2 mmol/L    Chloride 97 (L) 98 - 107 mmol/L    CO2 23.9 22.0 - 29.0 mmol/L    Calcium 9.2 8.6 - 10.5 mg/dL    Total Protein 7.0 6.0 - 8.5 g/dL    Albumin 3.90 3.50 - 5.20 g/dL    ALT (SGPT) 28 1 - 33 U/L    AST (SGOT) 26 1 - 32 U/L    Alkaline Phosphatase 117 39 - 117 U/L    Total Bilirubin 0.6 0.0 - 1.2 mg/dL    eGFR Non African Amer 88 >60 mL/min/1.73    Globulin 3.1 gm/dL    A/G Ratio 1.3 g/dL    BUN/Creatinine Ratio 7.2 7.0 - 25.0    Anion Gap 11.1 5.0 - 15.0 mmol/L   Urinalysis With Microscopic If Indicated (No Culture) - Urine, Clean Catch    Collection Time: 05/25/21  7:51 PM    Specimen: Urine, Clean Catch   Result Value Ref Range    Color, UA Yellow Yellow, Straw    Appearance, UA Clear Clear    pH, UA 7.0 4.5 - 8.0    Specific Gravity, UA 1.015 1.003 - 1.030    Glucose, UA Negative Negative    Ketones, UA 40 mg/dL (2+) (A) Negative    Bilirubin, UA Negative Negative    Blood, UA Trace (A) Negative    Protein, UA Negative Negative    Leuk Esterase, UA Negative Negative    Nitrite, UA Negative Negative    Urobilinogen, UA 1.0 E.U./dL 0.2 - 1.0 E.U./dL   CBC Auto Differential    Collection Time: 05/25/21  7:51  PM    Specimen: Blood   Result Value Ref Range    WBC 9.56 3.40 - 10.80 10*3/mm3    RBC 4.12 3.77 - 5.28 10*6/mm3    Hemoglobin 13.5 12.0 - 15.9 g/dL    Hematocrit 39.4 34.0 - 46.6 %    MCV 95.6 79.0 - 97.0 fL    MCH 32.8 26.6 - 33.0 pg    MCHC 34.3 31.5 - 35.7 g/dL    RDW 12.0 (L) 12.3 - 15.4 %    RDW-SD 42.1 37.0 - 54.0 fl    MPV 12.0 6.0 - 12.0 fL    Platelets 213 140 - 450 10*3/mm3    Neutrophil % 79.4 (H) 42.7 - 76.0 %    Lymphocyte % 11.0 (L) 19.6 - 45.3 %    Monocyte % 8.8 5.0 - 12.0 %    Eosinophil % 0.1 (L) 0.3 - 6.2 %    Basophil % 0.4 0.0 - 1.5 %    Immature Grans % 0.3 0.0 - 0.5 %    Neutrophils, Absolute 7.59 (H) 1.70 - 7.00 10*3/mm3    Lymphocytes, Absolute 1.05 0.70 - 3.10 10*3/mm3    Monocytes, Absolute 0.84 0.10 - 0.90 10*3/mm3    Eosinophils, Absolute 0.01 0.00 - 0.40 10*3/mm3    Basophils, Absolute 0.04 0.00 - 0.20 10*3/mm3    Immature Grans, Absolute 0.03 0.00 - 0.05 10*3/mm3    nRBC 0.0 0.0 - 0.2 /100 WBC   Urinalysis, Microscopic Only - Urine, Clean Catch    Collection Time: 05/25/21  7:51 PM    Specimen: Urine, Clean Catch   Result Value Ref Range    RBC, UA 3-5 (A) None Seen /HPF    WBC, UA 3-5 (A) None Seen /HPF    Bacteria, UA 1+ (A) None Seen /HPF    Squamous Epithelial Cells, UA 3-6 (A) None Seen, 0-2 /HPF    Hyaline Casts, UA None Seen None Seen /LPF    Methodology Manual Light Microscopy    Procalcitonin    Collection Time: 05/25/21  7:51 PM    Specimen: Blood   Result Value Ref Range    Procalcitonin 0.09 0.00 - 0.25 ng/mL   CBC AND DIFFERENTIAL    Collection Time: 05/27/21  3:40 PM    Specimen type and source: Whole Blood, Blood   Result Value Ref Range    WBC 10.50 4.5 - 11.0 10*3/uL    RBC 4.28 4.0 - 5.2 10*6/uL    Hemoglobin 13.9 12.0 - 16.0 g/dL    Hematocrit 39.8 36.0 - 46.0 %    MCV 93.0 80.0 - 100.0 fL    MCH 32.5 26.0 - 34.0 pg    MCHC 34.9 31.0 - 37.0 g/dL    RDW 12.2 12.0 - 16.8 %    Platelets 248 140 - 440 10*3/uL    MPV 11.6 8.4 - 12.4 fL    Differential Type Hospital CBC  w/AutoDiff (arb'U)    Neutrophil Rel % 91.2 (H) 45 - 80 %    Lymphocyte Rel % 5.3 (L) 15 - 50 %    Monocyte Rel % 2.5 0 - 15 %    Eosinophil % 0.1 0 - 7 %    Basophil Rel % 0.4 0 - 2 %    Immature Grans % 0.5 0.0 - 1.0 %    nRBC 0 0 /100(WBC)    Neutrophils Absolute 9.58 (H) 2.0 - 8.8 10*3/uL    Lymphocytes Absolute 0.56 (L) 0.7 - 5.5 10*3/uL    Monocytes Absolute 0.26 0.0 - 1.7 10*3/uL    Eosinophils Absolute 0.01 0.0 - 0.8 10*3/uL    Basophils Absolute 0.04 0.0 - 0.2 10*3/uL    Immature Grans, Absolute 0.05 0.00 - 0.10 10*3/uL   PROCALCITONIN    Collection Time: 05/27/21  7:32 PM    Specimen: Fresh Frozen Plasma    Specimen type and source: Plasma, Blood   Result Value Ref Range    Procalcitonin 65.62 (H) 0.1 - 0.5 ng/mL   HEPATITIS PANEL, ACUTE    Collection Time: 05/28/21  4:15 AM    Specimen type and source: Serum, Blood   Result Value Ref Range    Hepatitis B Surface Ag Nonreactive Nonreactive    Hep B Core IgM Nonreactive Nonreactive    Hep A IgM Interp Nonreactive Nonreactive    External Hepatitis C Ab Nonreactive Nonreactive   CBC (NO DIFF)    Collection Time: 05/28/21  4:15 AM    Specimen type and source: Whole Blood, Blood   Result Value Ref Range    WBC 10.77 4.5 - 11.0 10*3/uL    RBC 3.88 (L) 4.0 - 5.2 10*6/uL    Hemoglobin 12.5 12.0 - 16.0 g/dL    Hematocrit 36.2 36.0 - 46.0 %    MCV 93.3 80.0 - 100.0 fL    MCH 32.2 26.0 - 34.0 pg    MCHC 34.5 31.0 - 37.0 g/dL    RDW 12.5 12.0 - 16.8 %    Platelets 227 140 - 440 10*3/uL    MPV 11.5 8.4 - 12.4 fL   MAGNESIUM    Collection Time: 05/28/21  8:58 AM    Specimen: Fresh Frozen Plasma    Specimen type and source: Plasma, Blood   Result Value Ref Range    Magnesium 1.5 (L) 1.6 - 2.6 mg/dL   CBC (NO DIFF)    Collection Time: 05/29/21  3:15 AM    Specimen type and source: Whole Blood, Blood   Result Value Ref Range    WBC 8.11 4.5 - 11.0 10*3/uL    RBC 3.35 (L) 4.0 - 5.2 10*6/uL    Hemoglobin 10.7 (L) 12.0 - 16.0 g/dL    Hematocrit 31.1 (L) 36.0 - 46.0 %    MCV  92.8 80.0 - 100.0 fL    MCH 31.9 26.0 - 34.0 pg    MCHC 34.4 31.0 - 37.0 g/dL    RDW 12.3 12.0 - 16.8 %    Platelets 251 140 - 440 10*3/uL    MPV 11.2 8.4 - 12.4 fL   MAGNESIUM    Collection Time: 05/29/21  3:15 AM    Specimen: Fresh Frozen Plasma    Specimen type and source: Plasma, Blood   Result Value Ref Range    Magnesium 1.8 1.6 - 2.6 mg/dL   PROCALCITONIN    Collection Time: 05/30/21  3:34 AM    Specimen: Fresh Frozen Plasma    Specimen type and source: Plasma, Blood   Result Value Ref Range    Procalcitonin 10.45 (H) 0.1 - 0.5 ng/mL   CBC (NO DIFF)    Collection Time: 05/30/21  3:34 AM    Specimen type and source: Whole Blood, Blood   Result Value Ref Range    WBC 6.35 4.5 - 11.0 10*3/uL    RBC 3.11 (L) 4.0 - 5.2 10*6/uL    Hemoglobin 10.1 (L) 12.0 - 16.0 g/dL    Hematocrit 28.2 (L) 36.0 - 46.0 %    MCV 90.7 80.0 - 100.0 fL    MCH 32.5 26.0 - 34.0 pg    MCHC 35.8 31.0 - 37.0 g/dL    RDW 12.7 12.0 - 16.8 %    Platelets 247 140 - 440 10*3/uL    MPV 11.1 8.4 - 12.4 fL   MAGNESIUM    Collection Time: 05/30/21  3:34 AM    Specimen: Fresh Frozen Plasma    Specimen type and source: Plasma, Blood   Result Value Ref Range    Magnesium 1.8 1.6 - 2.6 mg/dL   CBC (NO DIFF)    Collection Time: 05/31/21  8:37 AM    Specimen type and source: Whole Blood, Blood   Result Value Ref Range    WBC 7.37 4.5 - 11.0 10*3/uL    RBC 3.48 (L) 4.0 - 5.2 10*6/uL    Hemoglobin 11.3 (L) 12.0 - 16.0 g/dL    Hematocrit 31.9 (L) 36.0 - 46.0 %    MCV 91.7 80.0 - 100.0 fL    MCH 32.5 26.0 - 34.0 pg    MCHC 35.4 31.0 - 37.0 g/dL    RDW 13.0 12.0 - 16.8 %    Platelets 319 140 - 440 10*3/uL    MPV 11.0 8.4 - 12.4 fL   MAGNESIUM    Collection Time: 05/31/21  8:37 AM    Specimen: Fresh Frozen Plasma    Specimen type and source: Plasma, Blood   Result Value Ref Range    Magnesium 1.9 1.6 - 2.6 mg/dL   MAGNESIUM    Collection Time: 06/01/21  7:25 AM    Specimen: Fresh Frozen Plasma    Specimen type and source: Plasma, Blood   Result Value Ref Range     Magnesium 1.8 1.6 - 2.6 mg/dL   CBC (NO DIFF)    Collection Time: 06/01/21  7:25 AM    Specimen type and source: Whole Blood, Blood   Result Value Ref Range    WBC 6.15 4.5 - 11.0 10*3/uL    RBC 3.19 (L) 4.0 - 5.2 10*6/uL    Hemoglobin 10.2 (L) 12.0 - 16.0 g/dL    Hematocrit 29.4 (L) 36.0 - 46.0 %    MCV 92.2 80.0 - 100.0 fL    MCH 32.0 26.0 - 34.0 pg    MCHC 34.7 31.0 - 37.0 g/dL    RDW 13.2 12.0 - 16.8 %    Platelets 335 140 - 440 10*3/uL    MPV 10.9 8.4 - 12.4 fL         Assessment/Plan   Diagnoses and all orders for this visit:    1. Pneumonia due to infectious organism, unspecified laterality, unspecified part of lung (Primary)  -     XR Chest PA & Lateral; Future  -     CBC & Differential  -     Comprehensive Metabolic Panel        Much improved.  Finished antibiotics.  Repeat CXR in 3 weeks.  Check CBC and CMP due to anemia and low potassium noted in the hospital.      Medications, including side effects, were discussed with the patient. Patient verbalized understanding.  The plan of care was discussed. All questions were answered. Patient verbalized understanding.      No follow-ups on file.

## 2021-06-08 LAB
ALBUMIN SERPL-MCNC: 4.5 G/DL (ref 3.5–5.2)
ALBUMIN/GLOB SERPL: 1.7 G/DL
ALP SERPL-CCNC: 118 U/L (ref 39–117)
ALT SERPL-CCNC: 33 U/L (ref 1–33)
AST SERPL-CCNC: 20 U/L (ref 1–32)
BASOPHILS # BLD AUTO: 0.06 10*3/MM3 (ref 0–0.2)
BASOPHILS NFR BLD AUTO: 0.9 % (ref 0–1.5)
BILIRUB SERPL-MCNC: 0.4 MG/DL (ref 0–1.2)
BUN SERPL-MCNC: 14 MG/DL (ref 6–20)
BUN/CREAT SERPL: 20.3 (ref 7–25)
CALCIUM SERPL-MCNC: 9.8 MG/DL (ref 8.6–10.5)
CHLORIDE SERPL-SCNC: 98 MMOL/L (ref 98–107)
CO2 SERPL-SCNC: 26.1 MMOL/L (ref 22–29)
CREAT SERPL-MCNC: 0.69 MG/DL (ref 0.57–1)
EOSINOPHIL # BLD AUTO: 0.29 10*3/MM3 (ref 0–0.4)
EOSINOPHIL NFR BLD AUTO: 4.5 % (ref 0.3–6.2)
ERYTHROCYTE [DISTWIDTH] IN BLOOD BY AUTOMATED COUNT: 12.5 % (ref 12.3–15.4)
GLOBULIN SER CALC-MCNC: 2.6 GM/DL
GLUCOSE SERPL-MCNC: 86 MG/DL (ref 65–99)
HCT VFR BLD AUTO: 38.6 % (ref 34–46.6)
HGB BLD-MCNC: 13.6 G/DL (ref 12–15.9)
IMM GRANULOCYTES # BLD AUTO: 0.03 10*3/MM3 (ref 0–0.05)
IMM GRANULOCYTES NFR BLD AUTO: 0.5 % (ref 0–0.5)
LYMPHOCYTES # BLD AUTO: 1.8 10*3/MM3 (ref 0.7–3.1)
LYMPHOCYTES NFR BLD AUTO: 27.9 % (ref 19.6–45.3)
MCH RBC QN AUTO: 33.3 PG (ref 26.6–33)
MCHC RBC AUTO-ENTMCNC: 35.2 G/DL (ref 31.5–35.7)
MCV RBC AUTO: 94.4 FL (ref 79–97)
MONOCYTES # BLD AUTO: 0.65 10*3/MM3 (ref 0.1–0.9)
MONOCYTES NFR BLD AUTO: 10.1 % (ref 5–12)
NEUTROPHILS # BLD AUTO: 3.62 10*3/MM3 (ref 1.7–7)
NEUTROPHILS NFR BLD AUTO: 56.1 % (ref 42.7–76)
NRBC BLD AUTO-RTO: 0 /100 WBC (ref 0–0.2)
PLATELET # BLD AUTO: 668 10*3/MM3 (ref 140–450)
POTASSIUM SERPL-SCNC: 4.9 MMOL/L (ref 3.5–5.2)
PROT SERPL-MCNC: 7.1 G/DL (ref 6–8.5)
RBC # BLD AUTO: 4.09 10*6/MM3 (ref 3.77–5.28)
SODIUM SERPL-SCNC: 134 MMOL/L (ref 136–145)
WBC # BLD AUTO: 6.45 10*3/MM3 (ref 3.4–10.8)

## 2021-06-17 DIAGNOSIS — D75.839 THROMBOCYTOSIS: Primary | ICD-10-CM

## 2021-06-24 DIAGNOSIS — F41.8 DEPRESSION WITH ANXIETY: ICD-10-CM

## 2021-06-25 ENCOUNTER — TELEPHONE (OUTPATIENT)
Dept: INTERNAL MEDICINE | Facility: CLINIC | Age: 56
End: 2021-06-25

## 2021-06-27 RX ORDER — ACYCLOVIR 400 MG/1
TABLET ORAL
Qty: 30 TABLET | Refills: 4 | Status: SHIPPED | OUTPATIENT
Start: 2021-06-27 | End: 2021-11-23 | Stop reason: SDUPTHER

## 2021-06-27 RX ORDER — BUPROPION HYDROCHLORIDE 300 MG/1
TABLET ORAL
Qty: 30 TABLET | Refills: 4 | Status: SHIPPED | OUTPATIENT
Start: 2021-06-27 | End: 2021-11-24 | Stop reason: SDUPTHER

## 2021-07-29 DIAGNOSIS — F41.8 DEPRESSION WITH ANXIETY: ICD-10-CM

## 2021-07-29 RX ORDER — ESCITALOPRAM OXALATE 10 MG/1
TABLET ORAL
Qty: 30 TABLET | Refills: 4 | Status: SHIPPED | OUTPATIENT
Start: 2021-07-29 | End: 2021-12-03 | Stop reason: SDUPTHER

## 2021-08-05 RX ORDER — TRAZODONE HYDROCHLORIDE 100 MG/1
TABLET ORAL
Qty: 30 TABLET | Refills: 1 | Status: SHIPPED | OUTPATIENT
Start: 2021-08-05 | End: 2021-12-06 | Stop reason: SDUPTHER

## 2021-08-06 DIAGNOSIS — M19.041 PRIMARY OSTEOARTHRITIS OF BOTH HANDS: Primary | ICD-10-CM

## 2021-08-06 DIAGNOSIS — M19.042 PRIMARY OSTEOARTHRITIS OF BOTH HANDS: Primary | ICD-10-CM

## 2021-08-30 ENCOUNTER — TELEPHONE (OUTPATIENT)
Dept: GASTROENTEROLOGY | Facility: CLINIC | Age: 56
End: 2021-08-30

## 2021-08-30 NOTE — TELEPHONE ENCOUNTER
----- Message from Aundrea Mcgarry sent at 8/27/2021  2:15 PM EDT -----  Regarding: Prescription Question  Contact: [phone]  Jewel Lynn,    Also, I would like to schedule an appointment with Dr. Lorenzo. I have had severe esophageal spasms every night this week, so I need to see what's going on.  Would it be possible for me to  some Dexilant samples next week?    Thank you!    Aundrea Mcgarry  [phone]

## 2021-09-01 RX ORDER — DEXLANSOPRAZOLE 60 MG/1
60 CAPSULE, DELAYED RELEASE ORAL EVERY EVENING
Qty: 30 CAPSULE | Refills: 1 | Status: SHIPPED | OUTPATIENT
Start: 2021-09-01 | End: 2021-09-02 | Stop reason: SDUPTHER

## 2021-09-02 RX ORDER — GABAPENTIN 100 MG/1
100 CAPSULE ORAL 2 TIMES DAILY
Qty: 60 CAPSULE | Refills: 5 | Status: SHIPPED | OUTPATIENT
Start: 2021-09-02 | End: 2021-09-03 | Stop reason: SDUPTHER

## 2021-09-02 RX ORDER — DEXLANSOPRAZOLE 60 MG/1
60 CAPSULE, DELAYED RELEASE ORAL EVERY EVENING
Qty: 30 CAPSULE | Refills: 1 | Status: SHIPPED | OUTPATIENT
Start: 2021-09-02 | End: 2021-12-16

## 2021-09-02 NOTE — TELEPHONE ENCOUNTER
Rx Refill Note  Requested Prescriptions     Pending Prescriptions Disp Refills    gabapentin (NEURONTIN) 100 MG capsule 50 capsule 5     Sig: Take 1 capsule by mouth 2 (two) times a day. Start with 1 capsule nightly X 2 weeks, then increase to 1 capsule BID.      Last office visit with prescribing clinician: 6/7/2021      Next office visit with prescribing clinician: She does not have an upcoming appointment and they last time see was seen concering these DX was 3/2021 which you wanted her to follow up in a month. I did send her a message to call and schedule an appointment.     Kari Estrella  09/02/21, 08:22 EDT

## 2021-09-02 NOTE — TELEPHONE ENCOUNTER
PATIENT CALLED STATING THAT HAS ALREADY SCHEDULED AN APPT AND NEEDS HER MEDS REFILLED     Caller: Aundrea Mcgarry    Relationship: Self    Medication needed:   Requested Prescriptions     Pending Prescriptions Disp Refills   • gabapentin (NEURONTIN) 100 MG capsule 50 capsule 5     Sig: Take 1 capsule by mouth 2 (two) times a day. Start with 1 capsule nightly X 2 weeks, then increase to 1 capsule BID.   • dexlansoprazole (DEXILANT) 60 MG capsule 30 capsule 1     Sig: Take 1 capsule by mouth Every Evening.     [address] [facility] - [phone]  - [phone]

## 2021-09-04 RX ORDER — GABAPENTIN 100 MG/1
100 CAPSULE ORAL 2 TIMES DAILY
Qty: 60 CAPSULE | Refills: 5 | Status: SHIPPED | OUTPATIENT
Start: 2021-09-04 | End: 2021-11-23 | Stop reason: SDUPTHER

## 2021-09-07 RX ORDER — LANSOPRAZOLE 30 MG/1
30 CAPSULE, DELAYED RELEASE ORAL 2 TIMES DAILY
Qty: 180 CAPSULE | Refills: 3 | Status: SHIPPED | OUTPATIENT
Start: 2021-09-07

## 2021-09-22 ENCOUNTER — OFFICE VISIT (OUTPATIENT)
Dept: INTERNAL MEDICINE | Facility: CLINIC | Age: 56
End: 2021-09-22

## 2021-09-22 VITALS
DIASTOLIC BLOOD PRESSURE: 70 MMHG | OXYGEN SATURATION: 98 % | HEART RATE: 59 BPM | HEIGHT: 60 IN | SYSTOLIC BLOOD PRESSURE: 120 MMHG | WEIGHT: 180 LBS | RESPIRATION RATE: 16 BRPM | BODY MASS INDEX: 35.34 KG/M2 | TEMPERATURE: 97.5 F

## 2021-09-22 DIAGNOSIS — Z00.00 ROUTINE HEALTH MAINTENANCE: ICD-10-CM

## 2021-09-22 DIAGNOSIS — G56.03 CARPAL TUNNEL SYNDROME ON BOTH SIDES: ICD-10-CM

## 2021-09-22 DIAGNOSIS — M79.10 MYALGIA: Primary | ICD-10-CM

## 2021-09-22 PROCEDURE — 90715 TDAP VACCINE 7 YRS/> IM: CPT | Performed by: FAMILY MEDICINE

## 2021-09-22 PROCEDURE — 99214 OFFICE O/P EST MOD 30 MIN: CPT | Performed by: FAMILY MEDICINE

## 2021-09-22 PROCEDURE — 90471 IMMUNIZATION ADMIN: CPT | Performed by: FAMILY MEDICINE

## 2021-09-22 NOTE — PROGRESS NOTES
Injection  Injection performed by Tea Abbott MA. Patient tolerated the procedure well without complications.  09/22/21   Tea Abbott MA   "Chief Complaint   Patient presents with     Cough       Initial Temp 97.4  F (36.3  C) (Axillary)  Wt 27 lb 10.5 oz (12.5 kg) Estimated body mass index is 16.56 kg/(m^2) as calculated from the following:    Height as of 1/19/17: 2' 9.5\" (0.851 m).    Weight as of 1/19/17: 26 lb 7 oz (12 kg).  Medication Reconciliation: complete   Yasmeen Charley      "

## 2021-09-22 NOTE — PROGRESS NOTES
"Subjective   Aundrea Mcgarry is a 55 y.o. female presenting today for follow up of   Chief Complaint   Patient presents with   • Joint Pain     medication management       History of Present Illness     1. Myalgias.  Pt had negative lab and x-ray workup other than mild degenerative changes in the hands.  She started Lyrica, which she didn't tolerate well, and we switched to gabapentin.  Pt reports that the gabapentin is effective at improving her pain and helping her function well and complete daily activities.  She tolerates it well.    2. Carpal tunnel syndrome.  She has surgery scheduled on 10/12 with Dr. Das on the left.  She plans to have the right side done sometime before the end of the year.    3. Routine health maint.  Pt has questions about vaccines.  She is welcoming a grandchild in December and may be due for Tdap.  She also wonders about a \"pneumonia vaccine\" given her history of pneumonia this past summer.    Patient Active Problem List   Diagnosis   • Abdominal pain   • Arthritis   • Bunion   • Gastroesophageal reflux disease with esophagitis   • Palpitations   • Dyskinesia of esophagus   • Ventricular premature beats   • Recurrent herpes labialis   • Hypothyroidism   • Dizziness   • Bilateral shoulder pain   • Bursitis/tendonitis, shoulder   • Annual physical exam   • Depression with anxiety   • Dysphagia   • Osteopenia of both thighs   • Hiatal hernia   • OAB (overactive bladder)   • Pulmonary nodule, right   • Umbilical hernia with obstruction, without gangrene   • Carpal tunnel syndrome on both sides   • Myalgia       Current Outpatient Medications on File Prior to Visit   Medication Sig   • acyclovir (ZOVIRAX) 400 MG tablet TAKE ONE TABLET BY MOUTH DAILY. TAKE NO MORE THAN 5 DOSES A DAY   • buPROPion XL (WELLBUTRIN XL) 300 MG 24 hr tablet TAKE ONE TABLET BY MOUTH EVERY MORNING   • CBD (cannabidiol) oral oil    • dexlansoprazole (DEXILANT) 60 MG capsule Take 1 capsule by mouth Every Evening. " "  • escitalopram (LEXAPRO) 10 MG tablet TAKE ONE TABLET BY MOUTH DAILY   • gabapentin (NEURONTIN) 100 MG capsule Take 1 capsule by mouth 2 (two) times a day.   • levothyroxine (SYNTHROID, LEVOTHROID) 125 MCG tablet TAKE ONE TABLET BY MOUTH DAILY   • metoprolol tartrate (LOPRESSOR) 25 MG tablet Take 1 tablet by mouth 2 (Two) Times a Day.   • MULTIPLE VITAMIN PO Take 1 tablet by mouth Daily.   • oxybutynin XL (DITROPAN-XL) 5 MG 24 hr tablet TAKE ONE TABLET BY MOUTH DAILY   • traZODone (DESYREL) 100 MG tablet TAKE ONE TABLET BY MOUTH EVERY NIGHT AT BEDTIME   • aluminum hydroxide-mag carbonate (GAVISCON EXTRA RELIEF) 160-105 MG chewable tablet chewable tablet Chew 1-2 tablets As Needed.   • famotidine (PEPCID) 10 MG tablet Take 10 mg by mouth 2 (Two) Times a Day.   • ibuprofen (ADVIL,MOTRIN) 200 MG tablet Take 600 mg by mouth Every 6 (Six) Hours As Needed for Mild Pain .   • lansoprazole (PREVACID) 30 MG capsule Take 1 capsule by mouth 2 (Two) Times a Day.     No current facility-administered medications on file prior to visit.          The following portions of the patient's history were reviewed and updated as appropriate: allergies, current medications, past family history, past medical history, past social history, past surgical history and problem list.    Review of Systems    Objective   Vitals:    09/22/21 0906   BP: 120/70   Pulse: 59   Resp: 16   Temp: 97.5 °F (36.4 °C)   TempSrc: Temporal   SpO2: 98%   Weight: 81.6 kg (180 lb)   Height: 152.4 cm (60\")       BP Readings from Last 3 Encounters:   09/22/21 120/70   06/07/21 125/60   05/25/21 117/52        Wt Readings from Last 3 Encounters:   09/22/21 81.6 kg (180 lb)   06/07/21 78 kg (172 lb)   05/25/21 77.1 kg (170 lb)        Body mass index is 35.15 kg/m².  Nursing notes and vitals reviewed.    Physical Exam  Vitals and nursing note reviewed.   Constitutional:       Appearance: Normal appearance.   HENT:      Head: Normocephalic and atraumatic.      " Mouth/Throat:      Mouth: Mucous membranes are moist.   Eyes:      Extraocular Movements: Extraocular movements intact.      Conjunctiva/sclera: Conjunctivae normal.   Pulmonary:      Effort: Pulmonary effort is normal. No respiratory distress.   Musculoskeletal:      Cervical back: Neck supple. No rigidity.      Right lower leg: No edema.      Left lower leg: No edema.   Skin:     General: Skin is warm and dry.   Neurological:      General: No focal deficit present.      Mental Status: She is alert and oriented to person, place, and time.   Psychiatric:         Mood and Affect: Mood normal.         Behavior: Behavior normal.         No results found for this or any previous visit (from the past 672 hour(s)).      Assessment/Plan   Diagnoses and all orders for this visit:    1. Myalgia (Primary)    2. Carpal tunnel syndrome on both sides    3. Routine health maintenance      1. Chronic myalgias.  Improved with gabapentin.  She is able to function well and complete her daily activities.  Med management agreement and Jacob obtained.    2. Carpal tunnel syndrome.  Surgery pending with Dr. Das.    3. RHM. Tdap and Pneumovax today.  Shingrix at pharmacy.  Flu vaccine declined.  Return for annual physical exam.      Medications, including side effects, were discussed with the patient. Patient verbalized understanding.  The plan of care was discussed. All questions were answered. Patient verbalized understanding.      Return for Annual physical.

## 2021-11-18 DIAGNOSIS — E03.9 HYPOTHYROIDISM, UNSPECIFIED TYPE: ICD-10-CM

## 2021-11-18 RX ORDER — OXYBUTYNIN CHLORIDE 5 MG/1
5 TABLET, EXTENDED RELEASE ORAL DAILY
Qty: 90 TABLET | Refills: 1 | Status: SHIPPED | OUTPATIENT
Start: 2021-11-18 | End: 2021-11-24 | Stop reason: SDUPTHER

## 2021-11-18 RX ORDER — LEVOTHYROXINE SODIUM 0.12 MG/1
125 TABLET ORAL DAILY
Qty: 90 TABLET | Refills: 1 | Status: SHIPPED | OUTPATIENT
Start: 2021-11-18 | End: 2021-11-24 | Stop reason: SDUPTHER

## 2021-11-23 DIAGNOSIS — F41.8 DEPRESSION WITH ANXIETY: ICD-10-CM

## 2021-11-23 RX ORDER — BUPROPION HYDROCHLORIDE 300 MG/1
300 TABLET ORAL EVERY MORNING
Qty: 30 TABLET | Refills: 4 | Status: CANCELLED | OUTPATIENT
Start: 2021-11-23

## 2021-11-24 DIAGNOSIS — N32.81 OAB (OVERACTIVE BLADDER): Primary | ICD-10-CM

## 2021-11-24 DIAGNOSIS — F41.8 DEPRESSION WITH ANXIETY: ICD-10-CM

## 2021-11-24 DIAGNOSIS — E03.9 HYPOTHYROIDISM, UNSPECIFIED TYPE: ICD-10-CM

## 2021-11-24 RX ORDER — BUPROPION HYDROCHLORIDE 300 MG/1
300 TABLET ORAL EVERY MORNING
Qty: 30 TABLET | Refills: 4 | Status: SHIPPED | OUTPATIENT
Start: 2021-11-24 | End: 2021-12-31 | Stop reason: SDUPTHER

## 2021-11-24 RX ORDER — OXYBUTYNIN CHLORIDE 5 MG/1
5 TABLET, EXTENDED RELEASE ORAL DAILY
Qty: 90 TABLET | Refills: 1 | Status: SHIPPED | OUTPATIENT
Start: 2021-11-24 | End: 2021-12-31 | Stop reason: SDUPTHER

## 2021-11-24 RX ORDER — GABAPENTIN 100 MG/1
100 CAPSULE ORAL 2 TIMES DAILY
Qty: 60 CAPSULE | Refills: 5 | Status: SHIPPED | OUTPATIENT
Start: 2021-11-24 | End: 2021-12-31 | Stop reason: SDUPTHER

## 2021-11-24 RX ORDER — ACYCLOVIR 400 MG/1
400 TABLET ORAL
Qty: 30 TABLET | Refills: 4 | Status: SHIPPED | OUTPATIENT
Start: 2021-11-24 | End: 2021-12-31 | Stop reason: SDUPTHER

## 2021-11-24 RX ORDER — LEVOTHYROXINE SODIUM 0.12 MG/1
125 TABLET ORAL DAILY
Qty: 90 TABLET | Refills: 1 | Status: SHIPPED | OUTPATIENT
Start: 2021-11-24 | End: 2021-12-31 | Stop reason: SDUPTHER

## 2021-12-03 DIAGNOSIS — F41.8 DEPRESSION WITH ANXIETY: ICD-10-CM

## 2021-12-03 RX ORDER — GABAPENTIN 100 MG/1
100 CAPSULE ORAL 2 TIMES DAILY
Qty: 60 CAPSULE | Refills: 5 | Status: CANCELLED | OUTPATIENT
Start: 2021-12-03

## 2021-12-06 RX ORDER — ESCITALOPRAM OXALATE 10 MG/1
10 TABLET ORAL DAILY
Qty: 30 TABLET | Refills: 4 | Status: SHIPPED | OUTPATIENT
Start: 2021-12-06 | End: 2021-12-31 | Stop reason: SDUPTHER

## 2021-12-06 RX ORDER — TRAZODONE HYDROCHLORIDE 100 MG/1
100 TABLET ORAL
Qty: 30 TABLET | Refills: 5 | Status: SHIPPED | OUTPATIENT
Start: 2021-12-06 | End: 2021-12-06

## 2021-12-06 RX ORDER — TRAZODONE HYDROCHLORIDE 100 MG/1
100 TABLET ORAL
Qty: 30 TABLET | Refills: 1 | Status: SHIPPED | OUTPATIENT
Start: 2021-12-06 | End: 2021-12-16

## 2021-12-06 NOTE — TELEPHONE ENCOUNTER
Rx Refill Note  Requested Prescriptions     Pending Prescriptions Disp Refills    traZODone (DESYREL) 100 MG tablet 30 tablet 1     Sig: Take 1 tablet by mouth every night at bedtime.      Last office visit with prescribing clinician: 9/22/2021      Next office visit with prescribing clinician: 12/16/2021            Lis Topete MA  12/06/21, 09:35 EST

## 2021-12-13 ENCOUNTER — TELEPHONE (OUTPATIENT)
Dept: INTERNAL MEDICINE | Facility: CLINIC | Age: 56
End: 2021-12-13

## 2021-12-13 DIAGNOSIS — Z00.00 ROUTINE HEALTH MAINTENANCE: Primary | ICD-10-CM

## 2021-12-13 NOTE — TELEPHONE ENCOUNTER
Hub staff attempted to follow warm transfer process and was unsuccessful     Caller: Aundrea Mcgarry    Relationship to patient: Self    Best call back number: [phone]    Patient is needing: PATIENT CALLED IN TO SCHEDULE HER LABS. SHE RECEIVED A MESSAGE ON HER [employer]. [employer]BS ATTEMPTED TO WARM TRANSFER AND WAS UNSUCCESSFUL. PLEASE CALL PATIENT AND ADVISE

## 2021-12-13 NOTE — TELEPHONE ENCOUNTER
Patient made appt to see Dr. Hodgson on 12/16 and unable to come before Tuesday 12/14 for labs. Appt for labs is on 12/14/21.

## 2021-12-14 ENCOUNTER — LAB (OUTPATIENT)
Dept: INTERNAL MEDICINE | Facility: CLINIC | Age: 56
End: 2021-12-14

## 2021-12-14 DIAGNOSIS — Z00.00 ROUTINE HEALTH MAINTENANCE: ICD-10-CM

## 2021-12-15 LAB
25(OH)D3+25(OH)D2 SERPL-MCNC: 35.2 NG/ML (ref 30–100)
ALBUMIN SERPL-MCNC: 4.2 G/DL (ref 3.8–4.9)
ALBUMIN/GLOB SERPL: 1.9 {RATIO} (ref 1.2–2.2)
ALP SERPL-CCNC: 79 IU/L (ref 44–121)
ALT SERPL-CCNC: 14 IU/L (ref 0–32)
AST SERPL-CCNC: 18 IU/L (ref 0–40)
BILIRUB SERPL-MCNC: 0.3 MG/DL (ref 0–1.2)
BUN SERPL-MCNC: 12 MG/DL (ref 6–24)
BUN/CREAT SERPL: 16 (ref 9–23)
CALCIUM SERPL-MCNC: 8.9 MG/DL (ref 8.7–10.2)
CHLORIDE SERPL-SCNC: 102 MMOL/L (ref 96–106)
CHOLEST SERPL-MCNC: 208 MG/DL (ref 100–199)
CHOLEST/HDLC SERPL: 3.9 RATIO (ref 0–4.4)
CO2 SERPL-SCNC: 22 MMOL/L (ref 20–29)
CREAT SERPL-MCNC: 0.77 MG/DL (ref 0.57–1)
GLOBULIN SER CALC-MCNC: 2.2 G/DL (ref 1.5–4.5)
GLUCOSE SERPL-MCNC: 97 MG/DL (ref 65–99)
HBA1C MFR BLD: 5.3 % (ref 4.8–5.6)
HDLC SERPL-MCNC: 53 MG/DL
LDLC SERPL CALC-MCNC: 137 MG/DL (ref 0–99)
POTASSIUM SERPL-SCNC: 4.3 MMOL/L (ref 3.5–5.2)
PROT SERPL-MCNC: 6.4 G/DL (ref 6–8.5)
SODIUM SERPL-SCNC: 138 MMOL/L (ref 134–144)
TRIGL SERPL-MCNC: 103 MG/DL (ref 0–149)
TSH SERPL DL<=0.005 MIU/L-ACNC: 2.3 UIU/ML (ref 0.45–4.5)
VIT B12 SERPL-MCNC: 347 PG/ML (ref 232–1245)
VLDLC SERPL CALC-MCNC: 18 MG/DL (ref 5–40)

## 2021-12-16 ENCOUNTER — OFFICE VISIT (OUTPATIENT)
Dept: INTERNAL MEDICINE | Facility: CLINIC | Age: 56
End: 2021-12-16

## 2021-12-16 VITALS
TEMPERATURE: 98 F | RESPIRATION RATE: 19 BRPM | BODY MASS INDEX: 36.2 KG/M2 | SYSTOLIC BLOOD PRESSURE: 140 MMHG | HEART RATE: 68 BPM | DIASTOLIC BLOOD PRESSURE: 74 MMHG | OXYGEN SATURATION: 98 % | HEIGHT: 60 IN | WEIGHT: 184.4 LBS

## 2021-12-16 DIAGNOSIS — M79.10 MYALGIA: ICD-10-CM

## 2021-12-16 DIAGNOSIS — B00.1 RECURRENT HERPES LABIALIS: ICD-10-CM

## 2021-12-16 DIAGNOSIS — F41.8 DEPRESSION WITH ANXIETY: ICD-10-CM

## 2021-12-16 DIAGNOSIS — G89.29 CHRONIC LEFT SHOULDER PAIN: ICD-10-CM

## 2021-12-16 DIAGNOSIS — N32.81 OAB (OVERACTIVE BLADDER): ICD-10-CM

## 2021-12-16 DIAGNOSIS — Z23 ENCOUNTER FOR IMMUNIZATION: ICD-10-CM

## 2021-12-16 DIAGNOSIS — M25.512 CHRONIC LEFT SHOULDER PAIN: ICD-10-CM

## 2021-12-16 DIAGNOSIS — Z00.00 ANNUAL PHYSICAL EXAM: Primary | ICD-10-CM

## 2021-12-16 PROCEDURE — 90471 IMMUNIZATION ADMIN: CPT | Performed by: FAMILY MEDICINE

## 2021-12-16 PROCEDURE — 99396 PREV VISIT EST AGE 40-64: CPT | Performed by: FAMILY MEDICINE

## 2021-12-16 PROCEDURE — 90686 IIV4 VACC NO PRSV 0.5 ML IM: CPT | Performed by: FAMILY MEDICINE

## 2021-12-16 RX ORDER — TRAZODONE HYDROCHLORIDE 100 MG/1
100 TABLET ORAL NIGHTLY
COMMUNITY
End: 2022-12-19 | Stop reason: SDUPTHER

## 2021-12-16 RX ORDER — FAMOTIDINE 10 MG
10 TABLET ORAL 2 TIMES DAILY PRN
Status: SHIPPED | COMMUNITY
Start: 2021-12-16

## 2021-12-16 NOTE — PROGRESS NOTES
Injection  Injection performed by Tiff Mtz MA. Patient tolerated the procedure well without complications.  12/16/21   Tiff Mtz MA

## 2021-12-16 NOTE — PROGRESS NOTES
Chief Complaint   Patient presents with   • Annual Exam       Patient Name: Aundrea Guillaume is a 56 y.o. female presenting for Annual Exam      Well Adult Physical   Patient here for a comprehensive physical exam.The patient reports no problems    Do you take any herbs or supplements that were not prescribed by a doctor? yes   Are you taking calcium supplements? no   Are you taking aspirin daily? no     History:  Any STD's in the past? none    Aundrea Mcgarry 56 y.o. female who presents for an Annual Wellness Visit.  she has a history of   Patient Active Problem List   Diagnosis   • Arthritis   • Bunion   • Gastroesophageal reflux disease with esophagitis   • Dyskinesia of esophagus   • Ventricular premature beats   • Recurrent herpes labialis   • Hypothyroidism   • Chronic left shoulder pain   • Bursitis/tendonitis, shoulder   • Annual physical exam   • Depression with anxiety   • Dysphagia   • Osteopenia of both thighs   • Hiatal hernia   • OAB (overactive bladder)   • Pulmonary nodule, right   • Umbilical hernia with obstruction, without gangrene   • Carpal tunnel syndrome on both sides   • Myalgia   .  she has been doing well with new interval problems.      Health Habits:  Dental Exam. not up to date - patient will schedule  Eye Exam. not up to date - patient will schedule  Exercise: 0 times/week.  Current exercise activities include: none      The following portions of the patient's history were reviewed and updated as appropriate: allergies, current medications, past family history, past medical history, past social history, past surgical history and problem list.    Review of Systems   Constitutional: Negative.    Respiratory: Negative.    Cardiovascular: Negative.    Gastrointestinal: Negative.    Endocrine: Negative.    Genitourinary: Negative.    Musculoskeletal: Positive for arthralgias and myalgias.   Skin: Negative.    Allergic/Immunologic: Positive for environmental  "allergies.   Hematological: Negative.    Psychiatric/Behavioral: Positive for sleep disturbance.       Erythromycin, Lactose intolerance (gi), and Adhesive tape      Current Outpatient Medications:   •  acyclovir (ZOVIRAX) 400 MG tablet, Take 1 tablet by mouth 5 (Five) Times a Day., Disp: 30 tablet, Rfl: 4  •  aluminum hydroxide-mag carbonate (GAVISCON EXTRA RELIEF) 160-105 MG chewable tablet chewable tablet, Chew 1-2 tablets As Needed., Disp: , Rfl:   •  buPROPion XL (WELLBUTRIN XL) 300 MG 24 hr tablet, Take 1 tablet by mouth Every Morning., Disp: 30 tablet, Rfl: 4  •  CBD (cannabidiol) oral oil, , Disp: , Rfl:   •  escitalopram (LEXAPRO) 10 MG tablet, Take 1 tablet by mouth Daily., Disp: 30 tablet, Rfl: 4  •  famotidine (PEPCID) 10 MG tablet, Take 1 tablet by mouth 2 (Two) Times a Day As Needed., Disp: , Rfl:   •  gabapentin (NEURONTIN) 100 MG capsule, Take 1 capsule by mouth 2 (Two) Times a Day., Disp: 60 capsule, Rfl: 5  •  ibuprofen (ADVIL,MOTRIN) 200 MG tablet, Take 600 mg by mouth Every 6 (Six) Hours As Needed for Mild Pain ., Disp: , Rfl:   •  lansoprazole (PREVACID) 30 MG capsule, Take 1 capsule by mouth 2 (Two) Times a Day., Disp: 180 capsule, Rfl: 3  •  levothyroxine (SYNTHROID, LEVOTHROID) 125 MCG tablet, Take 1 tablet by mouth Daily., Disp: 90 tablet, Rfl: 1  •  metoprolol tartrate (LOPRESSOR) 25 MG tablet, Take 1 tablet by mouth 2 (Two) Times a Day., Disp: 180 tablet, Rfl: 1  •  MULTIPLE VITAMIN PO, Take 1 tablet by mouth Daily., Disp: , Rfl:   •  oxybutynin XL (DITROPAN-XL) 5 MG 24 hr tablet, Take 1 tablet by mouth Daily., Disp: 90 tablet, Rfl: 1  •  traZODone (DESYREL) 100 MG tablet, Take 100 mg by mouth Every Night., Disp: , Rfl:     OBJECTIVE    /74   Pulse 68   Temp 98 °F (36.7 °C) (Temporal)   Resp 19   Ht 152.4 cm (60\")   Wt 83.6 kg (184 lb 6.4 oz)   SpO2 98%   BMI 36.01 kg/m²     Physical Exam  Vitals and nursing note reviewed.   Constitutional:       Appearance: Normal appearance. " She is well-developed.   HENT:      Head: Normocephalic and atraumatic.      Right Ear: Tympanic membrane and external ear normal.      Left Ear: Tympanic membrane and external ear normal.      Nose: Nose normal.      Mouth/Throat:      Mouth: Mucous membranes are moist.   Eyes:      General: No scleral icterus.     Extraocular Movements: Extraocular movements intact.      Conjunctiva/sclera: Conjunctivae normal.      Pupils: Pupils are equal, round, and reactive to light.   Neck:      Thyroid: No thyromegaly.   Cardiovascular:      Rate and Rhythm: Normal rate and regular rhythm.      Heart sounds: Normal heart sounds. No murmur heard.  No friction rub. No gallop.    Pulmonary:      Effort: Pulmonary effort is normal. No respiratory distress.      Breath sounds: Normal breath sounds. No wheezing or rales.   Abdominal:      General: Bowel sounds are normal.      Palpations: Abdomen is soft.      Tenderness: There is no abdominal tenderness.   Musculoskeletal:         General: No deformity.      Cervical back: Normal range of motion and neck supple.   Lymphadenopathy:      Cervical: No cervical adenopathy.   Skin:     General: Skin is warm and dry.      Findings: No rash.   Neurological:      General: No focal deficit present.      Mental Status: She is alert and oriented to person, place, and time.      Cranial Nerves: No cranial nerve deficit.      Motor: No abnormal muscle tone.      Coordination: Coordination normal.      Deep Tendon Reflexes: Reflexes are normal and symmetric. Reflexes normal.   Psychiatric:         Mood and Affect: Mood normal.         Behavior: Behavior normal.         Thought Content: Thought content normal.         Judgment: Judgment normal.         Common labs    Common Labsle 6/7/21 6/7/21 6/20/21 12/14/21 12/14/21 12/14/21    1526 1526  1034 1034 1034   Glucose  86    97   BUN  14    12   Creatinine  0.69    0.77   eGFR Non  Am  88    86   eGFR African Am  107    99   Sodium  134  (A)    138   Potassium  4.9    4.3   Chloride  98    102   Calcium  9.8    8.9   Total Protein  7.1    6.4   Albumin  4.50    4.2   Total Bilirubin  0.4    0.3   Alkaline Phosphatase  118 (A)    79   AST (SGOT)  20    18   ALT (SGPT)  33    14   WBC 6.45  5.66      Hemoglobin 13.6  14.2      Hematocrit 38.6  44.4      Platelets 668 (A)  241      Total Cholesterol    208 (A)     Triglycerides    103     HDL Cholesterol    53     LDL Cholesterol     137 (A)     Hemoglobin A1C     5.3    (A) Abnormal value       Comments are available for some flowsheets but are not being displayed.              [unfilled]    ASSESSMENT AND PLAN  Diagnoses and all orders for this visit:    1. Annual physical exam (Primary)  -     Mammo Screening Bilateral With CAD; Future    2. Depression with anxiety    3. OAB (overactive bladder)    4. Recurrent herpes labialis    5. Myalgia    6. Chronic left shoulder pain  -     Ambulatory Referral to Orthopedic Surgery    1. Depression and anxiety controlled with escitalopram and bupropion XL.      2. OAB.  Controlled with oxybutynin and lifestyle.    3. Recurrent herpes.  Continue acyclovir prn for flares.    4. Myalgias.  Symptoms improved with gabapentin and she is able to function better with daily activities.    5. Left shoulder pain.  She is seeing Dr. Olmos tomorrow.    Discussed healthy diet, exercise, cancer screening, immunizations, and preventive care.  Hm tab updated.  Encouraged seat belt use.  No texting while driving. Orders placed as below.     Mammogram ordered.  Colonoscopy due 2023.  Hysterectomy not needed.    begin progressive daily aerobic exercise program, continue current medications and return for routine annual checkups    [unfilled]    Return in about 6 months (around 6/16/2022).

## 2021-12-17 ENCOUNTER — OFFICE VISIT (OUTPATIENT)
Dept: ORTHOPEDIC SURGERY | Facility: CLINIC | Age: 56
End: 2021-12-17

## 2021-12-17 VITALS — TEMPERATURE: 97.2 F | HEIGHT: 60 IN | BODY MASS INDEX: 35.34 KG/M2 | WEIGHT: 180 LBS

## 2021-12-17 DIAGNOSIS — M25.512 LEFT SHOULDER PAIN, UNSPECIFIED CHRONICITY: Primary | ICD-10-CM

## 2021-12-17 DIAGNOSIS — M75.02 ADHESIVE CAPSULITIS OF LEFT SHOULDER: ICD-10-CM

## 2021-12-17 PROCEDURE — 99204 OFFICE O/P NEW MOD 45 MIN: CPT | Performed by: ORTHOPAEDIC SURGERY

## 2021-12-17 PROCEDURE — 20610 DRAIN/INJ JOINT/BURSA W/O US: CPT | Performed by: ORTHOPAEDIC SURGERY

## 2021-12-17 PROCEDURE — 73030 X-RAY EXAM OF SHOULDER: CPT | Performed by: ORTHOPAEDIC SURGERY

## 2021-12-17 RX ORDER — METHYLPREDNISOLONE ACETATE 80 MG/ML
80 INJECTION, SUSPENSION INTRA-ARTICULAR; INTRALESIONAL; INTRAMUSCULAR; SOFT TISSUE
Status: COMPLETED | OUTPATIENT
Start: 2021-12-17 | End: 2021-12-17

## 2021-12-17 RX ADMIN — METHYLPREDNISOLONE ACETATE 80 MG: 80 INJECTION, SUSPENSION INTRA-ARTICULAR; INTRALESIONAL; INTRAMUSCULAR; SOFT TISSUE at 14:46

## 2021-12-17 NOTE — PROGRESS NOTES
New Shoulder      Patient: Aundrea Mcgarry        YOB: 1965    Medical Record Number: [id number]        Chief Complaints: Left shoulder pain    History of Present Illness: This is a 56-year-old patient who is right-hand dominant she has left shoulder pain this been ongoing since the summer she states he thinks it started when she had to pick her grandson up quickly a couple times at about ballgame she states since then she has had progressive symptoms progressive loss of range of motion recently had her thumb and wrist operated on on the right and so is been over using the left arm.  Symptoms are moderate to severe depending on position worse with certain ranges somewhat better with rest she has had a frozen shoulder on the left that I took care previously.  Past medical history is remarkable for fibromyalgia reflux      Allergies:   Allergies   Allergen Reactions   • Erythromycin Nausea And Vomiting   • Lactose Intolerance (Gi) GI Intolerance   • Adhesive Tape Rash       Medications:   Home Medications:  Current Outpatient Medications on File Prior to Visit   Medication Sig   • acyclovir (ZOVIRAX) 400 MG tablet Take 1 tablet by mouth 5 (Five) Times a Day.   • aluminum hydroxide-mag carbonate (GAVISCON EXTRA RELIEF) 160-105 MG chewable tablet chewable tablet Chew 1-2 tablets As Needed.   • buPROPion XL (WELLBUTRIN XL) 300 MG 24 hr tablet Take 1 tablet by mouth Every Morning.   • CBD (cannabidiol) oral oil    • escitalopram (LEXAPRO) 10 MG tablet Take 1 tablet by mouth Daily.   • famotidine (PEPCID) 10 MG tablet Take 1 tablet by mouth 2 (Two) Times a Day As Needed.   • gabapentin (NEURONTIN) 100 MG capsule Take 1 capsule by mouth 2 (Two) Times a Day.   • ibuprofen (ADVIL,MOTRIN) 200 MG tablet Take 600 mg by mouth Every 6 (Six) Hours As Needed for Mild Pain .   • lansoprazole (PREVACID) 30 MG capsule Take 1 capsule by mouth 2 (Two) Times a Day.   • levothyroxine (SYNTHROID, LEVOTHROID) 125 MCG  tablet Take 1 tablet by mouth Daily.   • metoprolol tartrate (LOPRESSOR) 25 MG tablet Take 1 tablet by mouth 2 (Two) Times a Day.   • MULTIPLE VITAMIN PO Take 1 tablet by mouth Daily.   • oxybutynin XL (DITROPAN-XL) 5 MG 24 hr tablet Take 1 tablet by mouth Daily.   • traZODone (DESYREL) 100 MG tablet Take 100 mg by mouth Every Night.     No current facility-administered medications on file prior to visit.     Current Medications:  Scheduled Meds:  Continuous Infusions:No current facility-administered medications for this visit.    PRN Meds:.    Past Medical History:   Diagnosis Date   • Anxiety and depression    • Arthritis    • Broken jaw (HCC)     1995     • Esophageal spasm    • Fibromyalgia    • Fibromyalgia, primary    • GERD (gastroesophageal reflux disease)    • Hypertension    • Hypothyroidism    • IBS (irritable bowel syndrome)    • Nausea     chronic   • Overactive bladder    • Pneumonia    • PONV (postoperative nausea and vomiting)    • PVC (premature ventricular contraction)    • Rheumatoid arthritis (HCC)    • Umbilical hernia      Rheumatoid irritable bowel hypertension hypothyroidism  Past Surgical History:   Procedure Laterality Date   • BREAST BIOPSY Right early 90's   • CHOLECYSTECTOMY WITH INTRAOPERATIVE CHOLANGIOGRAM N/A 6/30/2017    Procedure: CHOLECYSTECTOMY LAPAROSCOPIC INTRAOPERATIVE CHOLANGIOGRAM;  Surgeon: David Kirkland MD;  Location: [facility];  Service:    • COLONOSCOPY     • COLONOSCOPY  2013?    DR BRAR   • ENDOSCOPY N/A 12/1/2017    Procedure: ESOPHAGOGASTRODUODENOSCOPY with esophageal dilitation to 20mm,, and tissue biopsies/ polypectomies esophagus and gastric;  Surgeon: Meliza Pope MD;  Location: [facility];  Service:    • EYE SURGERY Bilateral     LENS REPLACEMENT   • HIP SURGERY Left    • HYSTERECTOMY     • HYSTERECTOMY  2002   • INGUINAL HERNIA REPAIR Left     as a child   • LASER ABLATION N/A 2002   • MANDIBLE FRACTURE SURGERY     • TONSILLECTOMY     • UMBILICAL  "HERNIA REPAIR N/A 2020    Procedure: INCISIONAL HERNIA REPAIR WITH MESH;  Surgeon: David Kirkland MD;  Location[facility];  Service: General;  Laterality: N/A;   • UPPER GASTROINTESTINAL ENDOSCOPY N/A 2011    Normal upper endosopy - Dr. Meliza Pope        Social History     Occupational History   • Not on file   Tobacco Use   • Smoking status: Former Smoker     Packs/day: 0.25     Years: 3.00     Pack years: 0.75     Types: Cigarettes     Quit date: 2004     Years since quittin.4   • Smokeless tobacco: Never Used   Substance and Sexual Activity   • Alcohol use: Yes     Alcohol/week: 7.0 standard drinks     Types: 7 Glasses of wine per week   • Drug use: Never   • Sexual activity: Defer      Social History     Social History Narrative   • Not on file        Family History   Problem Relation Age of Onset   • Thyroid disease Mother    • Hypertension Mother    • Diabetes Father    • Hypertension Father    • Heart disease Father    • Breast cancer Maternal Aunt    • Breast cancer Paternal Aunt    • Heart disease Brother    • Malig Hyperthermia Neg Hx              Review of Systems: 14 point review of systems remarkable for the pertinent positives listed remainder negative    Review of Systems      Physical Exam: 56 y.o. female  General Appearance:    Alert, cooperative, in no acute distress                   Vitals:    21 1425   Temp: 97.2 °F (36.2 °C)   Weight: 81.6 kg (180 lb)   Height: 152.4 cm (60\")   PainSc:   6      Patient is alert and read ×3 no acute distress appears her above-listed at height weight and age.  Affect is normal respiratory rate is normal unlabored. Heart rate regular rate rhythm, sclera, dentition and hearing are normal for the purpose of this exam.    Ortho Exam Physical exam of the left shoulder reveals no overlying skin changes no lymphedema no lymphadenopathy.  Patient has active flexion 150 with mild symptoms passively I can get them to about 160 " abduction is similar external rotation is to 0 and internal rotation to their buttocks with  symptoms.  Patient has good rotator cuff strength 4+ over 5 with isometric strength testing with pain.  Patient has a positive impingement and a positive Love sign.  Patient has good cervical range of motion which is full and asymptomatic no radicular symptoms.  Patient has a normal elbow exam.  Good distal pulses are present      Large Joint Arthrocentesis: L glenohumeral  Date/Time: 12/17/2021 2:46 PM  Consent given by: patient  Site marked: site marked  Timeout: Immediately prior to procedure a time out was called to verify the correct patient, procedure, equipment, support staff and site/side marked as required   Supporting Documentation  Indications: pain   Procedure Details  Location: shoulder - L glenohumeral  Preparation: Patient was prepped and draped in the usual sterile fashion  Needle gauge: 21G.  Approach: posterior  Medications administered: 80 mg methylPREDNISolone acetate 80 MG/ML; 4 mL lidocaine (cardiac)  Patient tolerance: patient tolerated the procedure well with no immediate complications                Radiology:   AP, Scapular Y and Axillary Lateral of the left okay shoulder were ordered/reviewed to evauate shoulder pain.  I have no comparative films she has some acromioclavicular arthritis otherwise no acute bony pathology  Imaging Results (Most Recent)     Procedure Component Value Units Date/Time    XR Shoulder 2+ View Left [[id number]] Resulted: 12/17/21 1340     Updated: 12/17/21 1415    Impression:      Ordering physician's impression is located in the Encounter Note dated 12/17/21. X-ray performed in the DR room.          Assessment/Plan: Left shoulder pain I really think this is an early capsulitis we talked about options I think should do well with a glenohumeral injection today we will start her into physical therapy I will see her back in 5 weeks if she fails to improve we will pursue  perhaps a manipulation if she is proves her motion yet still symptomatic we will consider other means of testing

## 2021-12-28 ENCOUNTER — HOSPITAL ENCOUNTER (OUTPATIENT)
Dept: MAMMOGRAPHY | Facility: HOSPITAL | Age: 56
Discharge: HOME | End: 2021-12-28
Admitting: FAMILY MEDICINE

## 2021-12-28 DIAGNOSIS — Z00.00 ANNUAL PHYSICAL EXAM: ICD-10-CM

## 2021-12-28 PROCEDURE — 77063 BREAST TOMOSYNTHESIS BI: CPT

## 2021-12-28 PROCEDURE — 77067 SCR MAMMO BI INCL CAD: CPT

## 2021-12-31 DIAGNOSIS — N32.81 OAB (OVERACTIVE BLADDER): ICD-10-CM

## 2021-12-31 DIAGNOSIS — E03.9 HYPOTHYROIDISM, UNSPECIFIED TYPE: ICD-10-CM

## 2021-12-31 DIAGNOSIS — F41.8 DEPRESSION WITH ANXIETY: ICD-10-CM

## 2022-01-03 RX ORDER — OXYBUTYNIN CHLORIDE 5 MG/1
5 TABLET, EXTENDED RELEASE ORAL DAILY
Qty: 90 TABLET | Refills: 1 | Status: SHIPPED | OUTPATIENT
Start: 2022-01-03 | End: 2022-02-18 | Stop reason: SDUPTHER

## 2022-01-03 RX ORDER — BUPROPION HYDROCHLORIDE 300 MG/1
300 TABLET ORAL EVERY MORNING
Qty: 90 TABLET | Refills: 1 | Status: SHIPPED | OUTPATIENT
Start: 2022-01-03 | End: 2022-02-18 | Stop reason: SDUPTHER

## 2022-01-03 RX ORDER — ESCITALOPRAM OXALATE 10 MG/1
10 TABLET ORAL DAILY
Qty: 90 TABLET | Refills: 1 | Status: SHIPPED | OUTPATIENT
Start: 2022-01-03 | End: 2022-02-18 | Stop reason: SDUPTHER

## 2022-01-03 RX ORDER — GABAPENTIN 100 MG/1
100 CAPSULE ORAL 2 TIMES DAILY
Qty: 180 CAPSULE | Refills: 1 | Status: SHIPPED | OUTPATIENT
Start: 2022-01-03 | End: 2022-02-18 | Stop reason: SDUPTHER

## 2022-01-03 RX ORDER — LEVOTHYROXINE SODIUM 0.12 MG/1
125 TABLET ORAL DAILY
Qty: 90 TABLET | Refills: 1 | Status: SHIPPED | OUTPATIENT
Start: 2022-01-03 | End: 2022-02-18 | Stop reason: SDUPTHER

## 2022-01-03 RX ORDER — ACYCLOVIR 400 MG/1
400 TABLET ORAL
Qty: 30 TABLET | Refills: 4 | Status: SHIPPED | OUTPATIENT
Start: 2022-01-03 | End: 2022-02-18 | Stop reason: SDUPTHER

## 2022-02-18 DIAGNOSIS — E03.9 HYPOTHYROIDISM, UNSPECIFIED TYPE: ICD-10-CM

## 2022-02-18 DIAGNOSIS — N32.81 OAB (OVERACTIVE BLADDER): ICD-10-CM

## 2022-02-18 DIAGNOSIS — F41.8 DEPRESSION WITH ANXIETY: ICD-10-CM

## 2022-02-23 RX ORDER — LEVOTHYROXINE SODIUM 0.12 MG/1
125 TABLET ORAL DAILY
Qty: 90 TABLET | Refills: 1 | Status: SHIPPED | OUTPATIENT
Start: 2022-02-23 | End: 2022-06-18 | Stop reason: SDUPTHER

## 2022-02-23 RX ORDER — BUPROPION HYDROCHLORIDE 300 MG/1
300 TABLET ORAL EVERY MORNING
Qty: 90 TABLET | Refills: 1 | Status: SHIPPED | OUTPATIENT
Start: 2022-02-23 | End: 2022-12-19 | Stop reason: SDUPTHER

## 2022-02-23 RX ORDER — ACYCLOVIR 400 MG/1
400 TABLET ORAL
Qty: 30 TABLET | Refills: 4 | Status: SHIPPED | OUTPATIENT
Start: 2022-02-23 | End: 2022-12-19

## 2022-02-23 RX ORDER — GABAPENTIN 100 MG/1
100 CAPSULE ORAL 2 TIMES DAILY
Qty: 180 CAPSULE | Refills: 1 | Status: SHIPPED | OUTPATIENT
Start: 2022-02-23 | End: 2022-03-23 | Stop reason: SDUPTHER

## 2022-02-23 RX ORDER — OXYBUTYNIN CHLORIDE 5 MG/1
5 TABLET, EXTENDED RELEASE ORAL DAILY
Qty: 90 TABLET | Refills: 1 | Status: SHIPPED | OUTPATIENT
Start: 2022-02-23 | End: 2022-12-19 | Stop reason: SDUPTHER

## 2022-02-23 RX ORDER — ESCITALOPRAM OXALATE 10 MG/1
10 TABLET ORAL DAILY
Qty: 90 TABLET | Refills: 1 | Status: SHIPPED | OUTPATIENT
Start: 2022-02-23 | End: 2022-03-07 | Stop reason: SDUPTHER

## 2022-03-07 DIAGNOSIS — F41.8 DEPRESSION WITH ANXIETY: ICD-10-CM

## 2022-03-07 RX ORDER — ESCITALOPRAM OXALATE 20 MG/1
20 TABLET ORAL DAILY
Qty: 90 TABLET | Refills: 1 | Status: SHIPPED | OUTPATIENT
Start: 2022-03-07 | End: 2022-10-06 | Stop reason: SDUPTHER

## 2022-03-23 DIAGNOSIS — F41.8 DEPRESSION WITH ANXIETY: ICD-10-CM

## 2022-03-23 RX ORDER — GABAPENTIN 100 MG/1
200 CAPSULE ORAL 2 TIMES DAILY
Qty: 360 CAPSULE | Refills: 1 | Status: SHIPPED | OUTPATIENT
Start: 2022-03-23 | End: 2022-09-20 | Stop reason: SDUPTHER

## 2022-04-29 ENCOUNTER — OFFICE VISIT (OUTPATIENT)
Dept: ORTHOPEDIC SURGERY | Facility: CLINIC | Age: 57
End: 2022-04-29

## 2022-04-29 VITALS — TEMPERATURE: 97.1 F | BODY MASS INDEX: 35.34 KG/M2 | WEIGHT: 180 LBS | HEIGHT: 60 IN

## 2022-04-29 DIAGNOSIS — M25.511 RIGHT SHOULDER PAIN, UNSPECIFIED CHRONICITY: Primary | ICD-10-CM

## 2022-04-29 DIAGNOSIS — M75.40 IMPINGEMENT SYNDROME OF SHOULDER REGION, UNSPECIFIED LATERALITY: ICD-10-CM

## 2022-04-29 PROCEDURE — 73030 X-RAY EXAM OF SHOULDER: CPT | Performed by: ORTHOPAEDIC SURGERY

## 2022-04-29 PROCEDURE — 20610 DRAIN/INJ JOINT/BURSA W/O US: CPT | Performed by: ORTHOPAEDIC SURGERY

## 2022-04-29 PROCEDURE — 99214 OFFICE O/P EST MOD 30 MIN: CPT | Performed by: ORTHOPAEDIC SURGERY

## 2022-04-29 RX ORDER — UBIDECARENONE 75 MG
50 CAPSULE ORAL DAILY
COMMUNITY

## 2022-04-29 RX ADMIN — METHYLPREDNISOLONE ACETATE 80 MG: 80 INJECTION, SUSPENSION INTRA-ARTICULAR; INTRALESIONAL; INTRAMUSCULAR; SOFT TISSUE at 13:16

## 2022-04-29 RX ADMIN — LIDOCAINE HYDROCHLORIDE 4 ML: 20 INJECTION, SOLUTION EPIDURAL; INFILTRATION; INTRACAUDAL; PERINEURAL at 13:16

## 2022-05-02 RX ORDER — LIDOCAINE HYDROCHLORIDE 20 MG/ML
4 INJECTION, SOLUTION EPIDURAL; INFILTRATION; INTRACAUDAL; PERINEURAL
Status: COMPLETED | OUTPATIENT
Start: 2022-04-29 | End: 2022-04-29

## 2022-05-02 RX ORDER — METHYLPREDNISOLONE ACETATE 80 MG/ML
80 INJECTION, SUSPENSION INTRA-ARTICULAR; INTRALESIONAL; INTRAMUSCULAR; SOFT TISSUE
Status: COMPLETED | OUTPATIENT
Start: 2022-04-29 | End: 2022-04-29

## 2022-05-22 DIAGNOSIS — E03.9 HYPOTHYROIDISM, UNSPECIFIED TYPE: ICD-10-CM

## 2022-05-22 DIAGNOSIS — N32.81 OAB (OVERACTIVE BLADDER): ICD-10-CM

## 2022-05-24 RX ORDER — OXYBUTYNIN CHLORIDE 5 MG/1
5 TABLET, EXTENDED RELEASE ORAL DAILY
Qty: 90 TABLET | Refills: 1 | OUTPATIENT
Start: 2022-05-24

## 2022-05-24 RX ORDER — ACYCLOVIR 400 MG/1
400 TABLET ORAL
Qty: 30 TABLET | Refills: 4 | OUTPATIENT
Start: 2022-05-24

## 2022-05-24 RX ORDER — LEVOTHYROXINE SODIUM 0.12 MG/1
125 TABLET ORAL DAILY
Qty: 90 TABLET | Refills: 1 | OUTPATIENT
Start: 2022-05-24

## 2022-06-10 LAB
BASOPHILS # BLD AUTO: 0.1 X10E3/UL (ref 0–0.2)
BASOPHILS NFR BLD AUTO: 1 %
EOSINOPHIL # BLD AUTO: 0.2 X10E3/UL (ref 0–0.4)
EOSINOPHIL NFR BLD AUTO: 3 %
ERYTHROCYTE [DISTWIDTH] IN BLOOD BY AUTOMATED COUNT: 12.6 % (ref 11.7–15.4)
HCT VFR BLD AUTO: 42.1 % (ref 34–46.6)
HGB BLD-MCNC: 13.8 G/DL (ref 11.1–15.9)
IMM GRANULOCYTES # BLD AUTO: 0 X10E3/UL (ref 0–0.1)
IMM GRANULOCYTES NFR BLD AUTO: 0 %
LYMPHOCYTES # BLD AUTO: 2 X10E3/UL (ref 0.7–3.1)
LYMPHOCYTES NFR BLD AUTO: 30 %
MCH RBC QN AUTO: 31.6 PG (ref 26.6–33)
MCHC RBC AUTO-ENTMCNC: 32.8 G/DL (ref 31.5–35.7)
MCV RBC AUTO: 96 FL (ref 79–97)
MONOCYTES # BLD AUTO: 0.5 X10E3/UL (ref 0.1–0.9)
MONOCYTES NFR BLD AUTO: 7 %
NEUTROPHILS # BLD AUTO: 4 X10E3/UL (ref 1.4–7)
NEUTROPHILS NFR BLD AUTO: 59 %
PLATELET # BLD AUTO: 279 X10E3/UL (ref 150–450)
RBC # BLD AUTO: 4.37 X10E6/UL (ref 3.77–5.28)
WBC # BLD AUTO: 6.8 X10E3/UL (ref 3.4–10.8)

## 2022-06-16 ENCOUNTER — OFFICE VISIT (OUTPATIENT)
Dept: INTERNAL MEDICINE | Facility: CLINIC | Age: 57
End: 2022-06-16

## 2022-06-16 VITALS
DIASTOLIC BLOOD PRESSURE: 70 MMHG | SYSTOLIC BLOOD PRESSURE: 136 MMHG | WEIGHT: 187.4 LBS | HEART RATE: 64 BPM | OXYGEN SATURATION: 98 % | HEIGHT: 60 IN | TEMPERATURE: 98 F | BODY MASS INDEX: 36.79 KG/M2

## 2022-06-16 DIAGNOSIS — M79.10 MYALGIA: ICD-10-CM

## 2022-06-16 DIAGNOSIS — Z00.00 ROUTINE HEALTH MAINTENANCE: ICD-10-CM

## 2022-06-16 DIAGNOSIS — N32.81 OAB (OVERACTIVE BLADDER): ICD-10-CM

## 2022-06-16 DIAGNOSIS — B07.9 WARTS OF FOOT: ICD-10-CM

## 2022-06-16 DIAGNOSIS — F41.8 DEPRESSION WITH ANXIETY: Primary | ICD-10-CM

## 2022-06-16 DIAGNOSIS — I49.3 VENTRICULAR PREMATURE BEATS: ICD-10-CM

## 2022-06-16 DIAGNOSIS — E03.9 HYPOTHYROIDISM, UNSPECIFIED TYPE: ICD-10-CM

## 2022-06-16 DIAGNOSIS — K22.4 DYSKINESIA OF ESOPHAGUS: ICD-10-CM

## 2022-06-16 DIAGNOSIS — K21.00 GASTROESOPHAGEAL REFLUX DISEASE WITH ESOPHAGITIS WITHOUT HEMORRHAGE: ICD-10-CM

## 2022-06-16 DIAGNOSIS — B00.1 RECURRENT HERPES LABIALIS: ICD-10-CM

## 2022-06-16 PROCEDURE — 99214 OFFICE O/P EST MOD 30 MIN: CPT | Performed by: FAMILY MEDICINE

## 2022-06-16 PROCEDURE — 17110 DESTRUCTION B9 LES UP TO 14: CPT | Performed by: FAMILY MEDICINE

## 2022-06-16 NOTE — PROGRESS NOTES
Subjective   Aundrea Mcgarry is a 56 y.o. female presenting today for follow up of   Chief Complaint   Patient presents with   • Follow-up     6 mo f/u   • remove wart     Would like wart removed on top of right foot     • Hypothyroidism       History of Present Illness     1. Depression with anxiety.  Pt reports she is feeling well.  Tolerating bupropion and escitalopram.  Denies SI.    2. Myalgia.  Pt reports the gabapentin is still effective in controlling her pain.  She is tolerating it well and functioning better in her daily activities.    3. Wart.  She has a chronic wart on her right foot and requests treatment today.    Patient Active Problem List   Diagnosis   • Arthritis   • Bunion   • Gastroesophageal reflux disease with esophagitis   • Dyskinesia of esophagus   • Ventricular premature beats   • Recurrent herpes labialis   • Hypothyroidism   • Chronic left shoulder pain   • Bursitis/tendonitis, shoulder   • Annual physical exam   • Depression with anxiety   • Dysphagia   • Osteopenia of both thighs   • Hiatal hernia   • OAB (overactive bladder)   • Pulmonary nodule, right   • Umbilical hernia with obstruction, without gangrene   • Carpal tunnel syndrome on both sides   • Myalgia       Current Outpatient Medications on File Prior to Visit   Medication Sig   • acyclovir (ZOVIRAX) 400 MG tablet Take 1 tablet by mouth 5 (Five) Times a Day.   • aluminum hydroxide-mag carbonate (GAVISCON EXTRA RELIEF) 160-105 MG chewable tablet chewable tablet Chew 1-2 tablets As Needed.   • buPROPion XL (WELLBUTRIN XL) 300 MG 24 hr tablet Take 1 tablet by mouth Every Morning.   • CBD (cannabidiol) oral oil    • escitalopram (LEXAPRO) 20 MG tablet Take 1 tablet by mouth Daily.   • famotidine (PEPCID) 10 MG tablet Take 1 tablet by mouth 2 (Two) Times a Day As Needed.   • gabapentin (NEURONTIN) 100 MG capsule Take 2 capsules by mouth 2 (Two) Times a Day.   • ibuprofen (ADVIL,MOTRIN) 200 MG tablet Take 600 mg by mouth Every 6  "(Six) Hours As Needed for Mild Pain .   • lansoprazole (PREVACID) 30 MG capsule Take 1 capsule by mouth 2 (Two) Times a Day.   • metoprolol tartrate (LOPRESSOR) 25 MG tablet Take 1 tablet by mouth 2 (Two) Times a Day.   • MULTIPLE VITAMIN PO Take 1 tablet by mouth Daily.   • oxybutynin XL (DITROPAN-XL) 5 MG 24 hr tablet Take 1 tablet by mouth Daily.   • traZODone (DESYREL) 100 MG tablet Take 100 mg by mouth Every Night.   • vitamin B-12 (CYANOCOBALAMIN) 100 MCG tablet Take 50 mcg by mouth Daily.   • [DISCONTINUED] levothyroxine (SYNTHROID, LEVOTHROID) 125 MCG tablet Take 1 tablet by mouth Daily.     No current facility-administered medications on file prior to visit.          The following portions of the patient's history were reviewed and updated as appropriate: allergies, current medications, past family history, past medical history, past social history, past surgical history and problem list.    Review of Systems   Constitutional: Negative.    Respiratory: Negative.    Cardiovascular: Negative.    Skin: Positive for skin lesions (wart top of right foot).   Psychiatric/Behavioral: Negative.        Objective   Vitals:    06/16/22 1135   BP: 136/70   Pulse: 64   Temp: 98 °F (36.7 °C)   SpO2: 98%   Weight: 85 kg (187 lb 6.4 oz)   Height: 152.4 cm (60\")       BP Readings from Last 3 Encounters:   06/16/22 136/70   12/16/21 140/74   09/22/21 120/70        Wt Readings from Last 3 Encounters:   06/16/22 85 kg (187 lb 6.4 oz)   04/29/22 81.6 kg (180 lb)   12/17/21 81.6 kg (180 lb)        Body mass index is 36.6 kg/m².  Nursing notes and vitals reviewed.    Physical Exam  Vitals and nursing note reviewed.   Constitutional:       Appearance: Normal appearance. She is well-developed.   HENT:      Head: Normocephalic and atraumatic.      Mouth/Throat:      Mouth: Mucous membranes are moist.   Eyes:      Extraocular Movements: Extraocular movements intact.      Conjunctiva/sclera: Conjunctivae normal.   Neck:      Thyroid: " No thyromegaly.   Cardiovascular:      Rate and Rhythm: Normal rate and regular rhythm.      Heart sounds: Normal heart sounds.   Pulmonary:      Effort: Pulmonary effort is normal.      Breath sounds: Normal breath sounds.   Musculoskeletal:      Cervical back: Neck supple. No rigidity.      Right lower leg: No edema.      Left lower leg: No edema.   Skin:     General: Skin is warm and dry.      Findings: Lesion (6 mm verrucous papule right dorsal foot.) present.   Neurological:      General: No focal deficit present.      Mental Status: She is alert and oriented to person, place, and time.   Psychiatric:         Mood and Affect: Mood normal.         Behavior: Behavior normal.         Recent Results (from the past 672 hour(s))   CBC & Differential    Collection Time: 06/09/22  9:05 AM    Specimen: Blood   Result Value Ref Range    WBC 6.8 3.4 - 10.8 x10E3/uL    RBC 4.37 3.77 - 5.28 x10E6/uL    Hemoglobin 13.8 11.1 - 15.9 g/dL    Hematocrit 42.1 34.0 - 46.6 %    MCV 96 79 - 97 fL    MCH 31.6 26.6 - 33.0 pg    MCHC 32.8 31.5 - 35.7 g/dL    RDW 12.6 11.7 - 15.4 %    Platelets 279 150 - 450 x10E3/uL    Neutrophil Rel % 59 Not Estab. %    Lymphocyte Rel % 30 Not Estab. %    Monocyte Rel % 7 Not Estab. %    Eosinophil Rel % 3 Not Estab. %    Basophil Rel % 1 Not Estab. %    Neutrophils Absolute 4.0 1.4 - 7.0 x10E3/uL    Lymphocytes Absolute 2.0 0.7 - 3.1 x10E3/uL    Monocytes Absolute 0.5 0.1 - 0.9 x10E3/uL    Eosinophils Absolute 0.2 0.0 - 0.4 x10E3/uL    Basophils Absolute 0.1 0.0 - 0.2 x10E3/uL    Immature Granulocyte Rel % 0 Not Estab. %    Immature Grans Absolute 0.0 0.0 - 0.1 x10E3/uL   CBC & Differential    Collection Time: 06/16/22 12:22 PM    Specimen: Blood   Result Value Ref Range    WBC 5.4 3.4 - 10.8 x10E3/uL    RBC 4.13 3.77 - 5.28 x10E6/uL    Hemoglobin 13.4 11.1 - 15.9 g/dL    Hematocrit 39.3 34.0 - 46.6 %    MCV 95 79 - 97 fL    MCH 32.4 26.6 - 33.0 pg    MCHC 34.1 31.5 - 35.7 g/dL    RDW 12.8 11.7 -  15.4 %    Platelets 247 150 - 450 x10E3/uL    Neutrophil Rel % 62 Not Estab. %    Lymphocyte Rel % 26 Not Estab. %    Monocyte Rel % 8 Not Estab. %    Eosinophil Rel % 3 Not Estab. %    Basophil Rel % 1 Not Estab. %    Neutrophils Absolute 3.4 1.4 - 7.0 x10E3/uL    Lymphocytes Absolute 1.4 0.7 - 3.1 x10E3/uL    Monocytes Absolute 0.4 0.1 - 0.9 x10E3/uL    Eosinophils Absolute 0.2 0.0 - 0.4 x10E3/uL    Basophils Absolute 0.1 0.0 - 0.2 x10E3/uL    Immature Granulocyte Rel % 0 Not Estab. %    Immature Grans Absolute 0.0 0.0 - 0.1 x10E3/uL   Comprehensive Metabolic Panel    Collection Time: 06/16/22 12:22 PM    Specimen: Blood   Result Value Ref Range    Glucose 104 (H) 65 - 99 mg/dL    BUN 15 6 - 24 mg/dL    Creatinine 0.70 0.57 - 1.00 mg/dL    EGFR Result 101 >59 mL/min/1.73    BUN/Creatinine Ratio 21 9 - 23    Sodium 141 134 - 144 mmol/L    Potassium 4.4 3.5 - 5.2 mmol/L    Chloride 105 96 - 106 mmol/L    Total CO2 21 20 - 29 mmol/L    Calcium 9.2 8.7 - 10.2 mg/dL    Total Protein 6.3 6.0 - 8.5 g/dL    Albumin 4.0 3.8 - 4.9 g/dL    Globulin 2.3 1.5 - 4.5 g/dL    A/G Ratio 1.7 1.2 - 2.2    Total Bilirubin 0.3 0.0 - 1.2 mg/dL    Alkaline Phosphatase 85 44 - 121 IU/L    AST (SGOT) 16 0 - 40 IU/L    ALT (SGPT) 18 0 - 32 IU/L   Hemoglobin A1c    Collection Time: 06/16/22 12:22 PM    Specimen: Blood   Result Value Ref Range    Hemoglobin A1C 5.5 4.8 - 5.6 %   Vitamin B12    Collection Time: 06/16/22 12:22 PM    Specimen: Blood   Result Value Ref Range    Vitamin B-12 >2000 (H) 232 - 1245 pg/mL   Vitamin D 25 Hydroxy    Collection Time: 06/16/22 12:22 PM    Specimen: Blood   Result Value Ref Range    25 Hydroxy, Vitamin D 37.9 30.0 - 100.0 ng/mL   TSH    Collection Time: 06/16/22 12:22 PM    Specimen: Blood   Result Value Ref Range    TSH 0.241 (L) 0.450 - 4.500 uIU/mL   T4, Free    Collection Time: 06/16/22 12:22 PM    Specimen: Blood   Result Value Ref Range    Free T4 1.82 (H) 0.82 - 1.77 ng/dL   Lipid Panel With / Chol  / HDL Ratio    Collection Time: 06/16/22 12:22 PM    Specimen: Blood   Result Value Ref Range    Total Cholesterol 216 (H) 100 - 199 mg/dL    Triglycerides 125 0 - 149 mg/dL    HDL Cholesterol 54 >39 mg/dL    VLDL Cholesterol Dusty 22 5 - 40 mg/dL    LDL Chol Calc (NIH) 140 (H) 0 - 99 mg/dL    Chol/HDL Ratio 4.0 0.0 - 4.4 ratio         Assessment & Plan   Diagnoses and all orders for this visit:    1. Depression with anxiety (Primary)    2. OAB (overactive bladder)    3. Recurrent herpes labialis    4. Myalgia    5. Hypothyroidism, unspecified type  -     CBC & Differential  -     TSH  -     T4, Free  -     Lipid Panel With / Chol / HDL Ratio    6. Gastroesophageal reflux disease with esophagitis without hemorrhage    7. Dyskinesia of esophagus    8. Ventricular premature beats    9. Routine health maintenance  -     Comprehensive Metabolic Panel  -     Hemoglobin A1c  -     Vitamin B12  -     Vitamin D 25 Hydroxy    10. Warts of foot    1. Depression with anxiety.  Controlled with bupropion and escitalopram.    2. OAB.  Controlled with dietary measures and oxybutynin.    3. Recurrent herpes labialis.  No flare.  Continue prn acyclovir.    4. Myalgias.  Doing well with gabapentin.    5. Hypothyroidism.  On replacement with levothyroxine.  Check levels today.    6. GERD, esophageal dyskinesia.  Controlled with lansoprazole, prn famotidine, trazodone.    7. PVCs.  Symptoms improved with metoprolol.  Continue same.    8. RHM.  Mammogram UTD.  Colonoscopy due 2023.  Hysterectomy not needed.  Covid-19 vaccines and Tdap have been done.  Pt had dose pneumovax.  Shingrix at pharmacy.    9. Wart of right foot (dorsal surface).  Treated with cryotherapy, 15 second freeze-thaw cycle X 2.  Pt tolerated well.  Wound care instructions given.          Medications, including side effects, were discussed with the patient. Patient verbalized understanding.  The plan of care was discussed. All questions were answered. Patient verbalized  understanding.      Return in about 6 months (around 12/16/2022).

## 2022-06-17 LAB
25(OH)D3+25(OH)D2 SERPL-MCNC: 37.9 NG/ML (ref 30–100)
ALBUMIN SERPL-MCNC: 4 G/DL (ref 3.8–4.9)
ALBUMIN/GLOB SERPL: 1.7 {RATIO} (ref 1.2–2.2)
ALP SERPL-CCNC: 85 IU/L (ref 44–121)
ALT SERPL-CCNC: 18 IU/L (ref 0–32)
AST SERPL-CCNC: 16 IU/L (ref 0–40)
BASOPHILS # BLD AUTO: 0.1 X10E3/UL (ref 0–0.2)
BASOPHILS NFR BLD AUTO: 1 %
BILIRUB SERPL-MCNC: 0.3 MG/DL (ref 0–1.2)
BUN SERPL-MCNC: 15 MG/DL (ref 6–24)
BUN/CREAT SERPL: 21 (ref 9–23)
CALCIUM SERPL-MCNC: 9.2 MG/DL (ref 8.7–10.2)
CHLORIDE SERPL-SCNC: 105 MMOL/L (ref 96–106)
CHOLEST SERPL-MCNC: 216 MG/DL (ref 100–199)
CHOLEST/HDLC SERPL: 4 RATIO (ref 0–4.4)
CO2 SERPL-SCNC: 21 MMOL/L (ref 20–29)
CREAT SERPL-MCNC: 0.7 MG/DL (ref 0.57–1)
EGFRCR SERPLBLD CKD-EPI 2021: 101 ML/MIN/1.73
EOSINOPHIL # BLD AUTO: 0.2 X10E3/UL (ref 0–0.4)
EOSINOPHIL NFR BLD AUTO: 3 %
ERYTHROCYTE [DISTWIDTH] IN BLOOD BY AUTOMATED COUNT: 12.8 % (ref 11.7–15.4)
GLOBULIN SER CALC-MCNC: 2.3 G/DL (ref 1.5–4.5)
GLUCOSE SERPL-MCNC: 104 MG/DL (ref 65–99)
HBA1C MFR BLD: 5.5 % (ref 4.8–5.6)
HCT VFR BLD AUTO: 39.3 % (ref 34–46.6)
HDLC SERPL-MCNC: 54 MG/DL
HGB BLD-MCNC: 13.4 G/DL (ref 11.1–15.9)
IMM GRANULOCYTES # BLD AUTO: 0 X10E3/UL (ref 0–0.1)
IMM GRANULOCYTES NFR BLD AUTO: 0 %
LDLC SERPL CALC-MCNC: 140 MG/DL (ref 0–99)
LYMPHOCYTES # BLD AUTO: 1.4 X10E3/UL (ref 0.7–3.1)
LYMPHOCYTES NFR BLD AUTO: 26 %
MCH RBC QN AUTO: 32.4 PG (ref 26.6–33)
MCHC RBC AUTO-ENTMCNC: 34.1 G/DL (ref 31.5–35.7)
MCV RBC AUTO: 95 FL (ref 79–97)
MONOCYTES # BLD AUTO: 0.4 X10E3/UL (ref 0.1–0.9)
MONOCYTES NFR BLD AUTO: 8 %
NEUTROPHILS # BLD AUTO: 3.4 X10E3/UL (ref 1.4–7)
NEUTROPHILS NFR BLD AUTO: 62 %
PLATELET # BLD AUTO: 247 X10E3/UL (ref 150–450)
POTASSIUM SERPL-SCNC: 4.4 MMOL/L (ref 3.5–5.2)
PROT SERPL-MCNC: 6.3 G/DL (ref 6–8.5)
RBC # BLD AUTO: 4.13 X10E6/UL (ref 3.77–5.28)
SODIUM SERPL-SCNC: 141 MMOL/L (ref 134–144)
T4 FREE SERPL-MCNC: 1.82 NG/DL (ref 0.82–1.77)
TRIGL SERPL-MCNC: 125 MG/DL (ref 0–149)
TSH SERPL DL<=0.005 MIU/L-ACNC: 0.24 UIU/ML (ref 0.45–4.5)
VIT B12 SERPL-MCNC: >2000 PG/ML (ref 232–1245)
VLDLC SERPL CALC-MCNC: 22 MG/DL (ref 5–40)
WBC # BLD AUTO: 5.4 X10E3/UL (ref 3.4–10.8)

## 2022-06-18 DIAGNOSIS — E03.9 HYPOTHYROIDISM, UNSPECIFIED TYPE: Primary | ICD-10-CM

## 2022-06-18 RX ORDER — LEVOTHYROXINE SODIUM 112 UG/1
112 TABLET ORAL DAILY
Qty: 90 TABLET | Refills: 1 | Status: SHIPPED | OUTPATIENT
Start: 2022-06-18 | End: 2022-11-17 | Stop reason: SDUPTHER

## 2022-09-20 DIAGNOSIS — F41.8 DEPRESSION WITH ANXIETY: ICD-10-CM

## 2022-09-20 NOTE — TELEPHONE ENCOUNTER
Rx Refill Note  Requested Prescriptions     Pending Prescriptions Disp Refills   • gabapentin (NEURONTIN) 100 MG capsule 360 capsule 1     Sig: Take 2 capsules by mouth 2 (Two) Times a Day.      Last office visit with prescribing clinician: 6/16/2022      Next office visit with prescribing clinician: 12/19/2022            AMY ORTIZ MA  09/20/22, 18:04 EDT

## 2022-09-21 RX ORDER — GABAPENTIN 100 MG/1
200 CAPSULE ORAL 2 TIMES DAILY
Qty: 360 CAPSULE | Refills: 1 | Status: SHIPPED | OUTPATIENT
Start: 2022-09-21 | End: 2022-12-19 | Stop reason: SDUPTHER

## 2022-09-27 ENCOUNTER — LAB (OUTPATIENT)
Dept: LAB | Facility: HOSPITAL | Age: 57
End: 2022-09-27

## 2022-09-27 ENCOUNTER — TRANSCRIBE ORDERS (OUTPATIENT)
Dept: ADMINISTRATIVE | Facility: HOSPITAL | Age: 57
End: 2022-09-27

## 2022-09-27 DIAGNOSIS — M21.621 TAILOR'S BUNIONETTE, RIGHT: ICD-10-CM

## 2022-09-27 DIAGNOSIS — M20.21 HALLUX RIGIDUS OF RIGHT FOOT: Primary | ICD-10-CM

## 2022-09-27 DIAGNOSIS — M20.21 HALLUX RIGIDUS OF RIGHT FOOT: ICD-10-CM

## 2022-09-27 LAB
ALBUMIN SERPL-MCNC: 4.2 G/DL (ref 3.5–5.2)
ALBUMIN/GLOB SERPL: 1.6 G/DL
ALP SERPL-CCNC: 93 U/L (ref 39–117)
ALT SERPL W P-5'-P-CCNC: 23 U/L (ref 1–33)
ANION GAP SERPL CALCULATED.3IONS-SCNC: 10.3 MMOL/L (ref 5–15)
AST SERPL-CCNC: 19 U/L (ref 1–32)
BASOPHILS # BLD AUTO: 0.07 10*3/MM3 (ref 0–0.2)
BASOPHILS NFR BLD AUTO: 1 % (ref 0–1.5)
BILIRUB SERPL-MCNC: 0.3 MG/DL (ref 0–1.2)
BUN SERPL-MCNC: 14 MG/DL (ref 6–20)
BUN/CREAT SERPL: 16.9 (ref 7–25)
CALCIUM SPEC-SCNC: 10 MG/DL (ref 8.6–10.5)
CHLORIDE SERPL-SCNC: 98 MMOL/L (ref 98–107)
CO2 SERPL-SCNC: 24.7 MMOL/L (ref 22–29)
CREAT SERPL-MCNC: 0.83 MG/DL (ref 0.57–1)
DEPRECATED RDW RBC AUTO: 40.1 FL (ref 37–54)
EGFRCR SERPLBLD CKD-EPI 2021: 82.3 ML/MIN/1.73
EOSINOPHIL # BLD AUTO: 0.2 10*3/MM3 (ref 0–0.4)
EOSINOPHIL NFR BLD AUTO: 2.9 % (ref 0.3–6.2)
ERYTHROCYTE [DISTWIDTH] IN BLOOD BY AUTOMATED COUNT: 11.5 % (ref 12.3–15.4)
GLOBULIN UR ELPH-MCNC: 2.7 GM/DL
GLUCOSE SERPL-MCNC: 93 MG/DL (ref 65–99)
HCT VFR BLD AUTO: 41 % (ref 34–46.6)
HGB BLD-MCNC: 14.2 G/DL (ref 12–15.9)
IMM GRANULOCYTES # BLD AUTO: 0.01 10*3/MM3 (ref 0–0.05)
IMM GRANULOCYTES NFR BLD AUTO: 0.1 % (ref 0–0.5)
LYMPHOCYTES # BLD AUTO: 1.62 10*3/MM3 (ref 0.7–3.1)
LYMPHOCYTES NFR BLD AUTO: 23.4 % (ref 19.6–45.3)
MCH RBC QN AUTO: 32.3 PG (ref 26.6–33)
MCHC RBC AUTO-ENTMCNC: 34.6 G/DL (ref 31.5–35.7)
MCV RBC AUTO: 93.2 FL (ref 79–97)
MONOCYTES # BLD AUTO: 0.54 10*3/MM3 (ref 0.1–0.9)
MONOCYTES NFR BLD AUTO: 7.8 % (ref 5–12)
NEUTROPHILS NFR BLD AUTO: 4.49 10*3/MM3 (ref 1.7–7)
NEUTROPHILS NFR BLD AUTO: 64.8 % (ref 42.7–76)
NRBC BLD AUTO-RTO: 0 /100 WBC (ref 0–0.2)
PLATELET # BLD AUTO: 256 10*3/MM3 (ref 140–450)
PMV BLD AUTO: 12.1 FL (ref 6–12)
POTASSIUM SERPL-SCNC: 4.5 MMOL/L (ref 3.5–5.2)
PROT SERPL-MCNC: 6.9 G/DL (ref 6–8.5)
RBC # BLD AUTO: 4.4 10*6/MM3 (ref 3.77–5.28)
SODIUM SERPL-SCNC: 133 MMOL/L (ref 136–145)
WBC NRBC COR # BLD: 6.93 10*3/MM3 (ref 3.4–10.8)

## 2022-09-27 PROCEDURE — 80053 COMPREHEN METABOLIC PANEL: CPT

## 2022-09-27 PROCEDURE — 85025 COMPLETE CBC W/AUTO DIFF WBC: CPT

## 2022-09-27 PROCEDURE — 36415 COLL VENOUS BLD VENIPUNCTURE: CPT

## 2022-09-30 ENCOUNTER — LAB REQUISITION (OUTPATIENT)
Dept: LAB | Facility: HOSPITAL | Age: 57
End: 2022-09-30

## 2022-09-30 DIAGNOSIS — B07.0 PLANTAR WART: ICD-10-CM

## 2022-09-30 PROCEDURE — 88305 TISSUE EXAM BY PATHOLOGIST: CPT | Performed by: PODIATRIST

## 2022-10-03 LAB
LAB AP CASE REPORT: NORMAL
LAB AP CLINICAL INFORMATION: NORMAL
PATH REPORT.FINAL DX SPEC: NORMAL
PATH REPORT.GROSS SPEC: NORMAL

## 2022-10-06 DIAGNOSIS — F41.8 DEPRESSION WITH ANXIETY: ICD-10-CM

## 2022-10-06 RX ORDER — ESCITALOPRAM OXALATE 20 MG/1
20 TABLET ORAL DAILY
Qty: 90 TABLET | Refills: 1 | Status: SHIPPED | OUTPATIENT
Start: 2022-10-06 | End: 2023-04-03 | Stop reason: SDUPTHER

## 2022-10-06 NOTE — TELEPHONE ENCOUNTER
Rx Refill Note  Requested Prescriptions     Pending Prescriptions Disp Refills   • escitalopram (LEXAPRO) 20 MG tablet 90 tablet 1     Sig: Take 1 tablet by mouth Daily.      Last office visit with prescribing clinician: 6/16/2022      Next office visit with prescribing clinician: 12/19/2022            Vikotriya Rizvi MA  10/06/22, 13:12 EDT

## 2022-11-17 DIAGNOSIS — E03.9 HYPOTHYROIDISM, UNSPECIFIED TYPE: ICD-10-CM

## 2022-11-17 RX ORDER — LEVOTHYROXINE SODIUM 112 UG/1
112 TABLET ORAL DAILY
Qty: 90 TABLET | Refills: 1 | Status: SHIPPED | OUTPATIENT
Start: 2022-11-17

## 2022-11-17 NOTE — TELEPHONE ENCOUNTER
Rx Refill Note  Requested Prescriptions     Pending Prescriptions Disp Refills   • levothyroxine (SYNTHROID, LEVOTHROID) 112 MCG tablet 90 tablet 1     Sig: Take 1 tablet by mouth Daily.      Last office visit with prescribing clinician: 6/16/2022      Next office visit with prescribing clinician: 12/19/2022            Viktoriya Rizvi MA  11/17/22, 15:57 EST

## 2022-12-05 ENCOUNTER — TRANSCRIBE ORDERS (OUTPATIENT)
Dept: ADMINISTRATIVE | Facility: HOSPITAL | Age: 57
End: 2022-12-05

## 2022-12-05 DIAGNOSIS — Z12.31 SCREENING MAMMOGRAM FOR BREAST CANCER: Primary | ICD-10-CM

## 2022-12-07 DIAGNOSIS — E03.9 HYPOTHYROIDISM, UNSPECIFIED TYPE: Primary | ICD-10-CM

## 2022-12-07 DIAGNOSIS — Z00.00 ROUTINE HEALTH MAINTENANCE: ICD-10-CM

## 2022-12-07 DIAGNOSIS — E55.9 HYPOVITAMINOSIS D: ICD-10-CM

## 2022-12-19 ENCOUNTER — OFFICE VISIT (OUTPATIENT)
Dept: INTERNAL MEDICINE | Facility: CLINIC | Age: 57
End: 2022-12-19

## 2022-12-19 VITALS
TEMPERATURE: 98.2 F | SYSTOLIC BLOOD PRESSURE: 108 MMHG | HEART RATE: 57 BPM | OXYGEN SATURATION: 99 % | WEIGHT: 179 LBS | HEIGHT: 60 IN | BODY MASS INDEX: 35.14 KG/M2 | DIASTOLIC BLOOD PRESSURE: 66 MMHG

## 2022-12-19 DIAGNOSIS — N32.81 OAB (OVERACTIVE BLADDER): ICD-10-CM

## 2022-12-19 DIAGNOSIS — F41.8 DEPRESSION WITH ANXIETY: ICD-10-CM

## 2022-12-19 DIAGNOSIS — E03.9 HYPOTHYROIDISM, UNSPECIFIED TYPE: ICD-10-CM

## 2022-12-19 DIAGNOSIS — F41.8 DEPRESSION WITH ANXIETY: Primary | ICD-10-CM

## 2022-12-19 DIAGNOSIS — Z00.00 ROUTINE HEALTH MAINTENANCE: ICD-10-CM

## 2022-12-19 DIAGNOSIS — I49.3 VENTRICULAR PREMATURE BEATS: ICD-10-CM

## 2022-12-19 DIAGNOSIS — K21.00 GASTROESOPHAGEAL REFLUX DISEASE WITH ESOPHAGITIS WITHOUT HEMORRHAGE: ICD-10-CM

## 2022-12-19 DIAGNOSIS — B00.1 RECURRENT HERPES LABIALIS: ICD-10-CM

## 2022-12-19 DIAGNOSIS — M79.10 MYALGIA: ICD-10-CM

## 2022-12-19 PROCEDURE — 99214 OFFICE O/P EST MOD 30 MIN: CPT | Performed by: FAMILY MEDICINE

## 2022-12-19 PROCEDURE — 90686 IIV4 VACC NO PRSV 0.5 ML IM: CPT | Performed by: FAMILY MEDICINE

## 2022-12-19 PROCEDURE — 90471 IMMUNIZATION ADMIN: CPT | Performed by: FAMILY MEDICINE

## 2022-12-19 RX ORDER — ACYCLOVIR 400 MG/1
400 TABLET ORAL
Qty: 30 TABLET | Refills: 4 | Status: SHIPPED | OUTPATIENT
Start: 2022-12-19

## 2022-12-19 RX ORDER — OXYBUTYNIN CHLORIDE 5 MG/1
5 TABLET, EXTENDED RELEASE ORAL DAILY
Qty: 90 TABLET | Refills: 1 | Status: SHIPPED | OUTPATIENT
Start: 2022-12-19

## 2022-12-19 RX ORDER — BUPROPION HYDROCHLORIDE 300 MG/1
300 TABLET ORAL EVERY MORNING
Qty: 90 TABLET | Refills: 1 | Status: SHIPPED | OUTPATIENT
Start: 2022-12-19

## 2022-12-19 RX ORDER — TRAZODONE HYDROCHLORIDE 100 MG/1
100 TABLET ORAL NIGHTLY
Qty: 90 TABLET | Refills: 1 | Status: SHIPPED | OUTPATIENT
Start: 2022-12-19

## 2022-12-19 RX ORDER — GABAPENTIN 300 MG/1
300 CAPSULE ORAL 2 TIMES DAILY
Qty: 60 CAPSULE | Refills: 5 | Status: SHIPPED | OUTPATIENT
Start: 2022-12-19

## 2022-12-19 NOTE — TELEPHONE ENCOUNTER
Med was d/c but pt requesting refill.     Rx Refill Note  Requested Prescriptions     Pending Prescriptions Disp Refills   • acyclovir (ZOVIRAX) 400 MG tablet 30 tablet 4     Sig: Take 1 tablet by mouth 5 (Five) Times a Day.     Signed Prescriptions Disp Refills   • traZODone (DESYREL) 100 MG tablet 90 tablet 1     Sig: Take 1 tablet by mouth Every Night.     Authorizing Provider: JULIETTE BEE      Last office visit with prescribing clinician: 12/19/2022   Last telemedicine visit with prescribing clinician: 6/12/2023   Next office visit with prescribing clinician: 6/19/2023                         Would you like a call back once the refill request has been completed: [] Yes [] No    If the office needs to give you a call back, can they leave a voicemail: [] Yes [] No    Jomar Ross, PCT  12/19/22, 14:56 EST

## 2022-12-19 NOTE — TELEPHONE ENCOUNTER
Rx Refill Note  Requested Prescriptions     Pending Prescriptions Disp Refills   • traZODone (DESYREL) 100 MG tablet 90 tablet 0     Sig: Take 1 tablet by mouth Every Night.      Last office visit with prescribing clinician: 12/19/2022   Last telemedicine visit with prescribing clinician: Visit date not found   Next office visit with prescribing clinician: Visit date not found                         Would you like a call back once the refill request has been completed: [] Yes [] No    If the office needs to give you a call back, can they leave a voicemail: [] Yes [] No    Jomar Ross, PCT  12/19/22, 11:54 EST

## 2022-12-19 NOTE — PROGRESS NOTES
Subjective   Audnrea Mcgarry is a 57 y.o. female presenting today for follow up of   Chief Complaint   Patient presents with   • Anxiety     F/U depression and anxiety  Pt was under the impression that today was a phys       History of Present Illness     1. Depression with anxiety.  Pt reports she is feeling well.  Tolerating bupropion and escitalopram.  Denies SI.    2. Myalgia.  Pt reports the gabapentin is still effective in controlling her pain.  She is tolerating it well and functioning better in her daily activities.    3. Hypothyroidism.  Pt is compliant with daily replacement.    Patient Active Problem List   Diagnosis   • Arthritis   • Bunion   • Gastroesophageal reflux disease with esophagitis   • Dyskinesia of esophagus   • Ventricular premature beats   • Recurrent herpes labialis   • Hypothyroidism   • Chronic left shoulder pain   • Bursitis/tendonitis, shoulder   • Routine health maintenance   • Depression with anxiety   • Dysphagia   • Osteopenia of both thighs   • Hiatal hernia   • OAB (overactive bladder)   • Pulmonary nodule, right   • Umbilical hernia with obstruction, without gangrene   • Carpal tunnel syndrome on both sides   • Myalgia       Current Outpatient Medications on File Prior to Visit   Medication Sig   • aluminum hydroxide-mag carbonate (GAVISCON EXTRA RELIEF) 160-105 MG chewable tablet chewable tablet Chew 1-2 tablets As Needed.   • escitalopram (LEXAPRO) 20 MG tablet Take 1 tablet by mouth Daily.   • famotidine (PEPCID) 10 MG tablet Take 1 tablet by mouth 2 (Two) Times a Day As Needed.   • ibuprofen (ADVIL,MOTRIN) 200 MG tablet Take 600 mg by mouth Every 6 (Six) Hours As Needed for Mild Pain .   • lansoprazole (PREVACID) 30 MG capsule Take 1 capsule by mouth 2 (Two) Times a Day.   • levothyroxine (SYNTHROID, LEVOTHROID) 112 MCG tablet Take 1 tablet by mouth Daily.   • metoprolol tartrate (LOPRESSOR) 25 MG tablet Take 1 tablet by mouth 2 (Two) Times a Day.   • MULTIPLE VITAMIN PO  "Take 1 tablet by mouth Daily.   • traZODone (DESYREL) 100 MG tablet Take 100 mg by mouth Every Night.   • vitamin B-12 (CYANOCOBALAMIN) 100 MCG tablet Take 50 mcg by mouth Daily.   • [DISCONTINUED] buPROPion XL (WELLBUTRIN XL) 300 MG 24 hr tablet Take 1 tablet by mouth Every Morning.   • [DISCONTINUED] gabapentin (NEURONTIN) 100 MG capsule Take 2 capsules by mouth 2 (Two) Times a Day.   • [DISCONTINUED] oxybutynin XL (DITROPAN-XL) 5 MG 24 hr tablet Take 1 tablet by mouth Daily.   • CBD (cannabidiol) oral oil    • [DISCONTINUED] acyclovir (ZOVIRAX) 400 MG tablet Take 1 tablet by mouth 5 (Five) Times a Day.     No current facility-administered medications on file prior to visit.          The following portions of the patient's history were reviewed and updated as appropriate: allergies, current medications, past family history, past medical history, past social history, past surgical history and problem list.    Review of Systems   Constitutional: Negative.    Respiratory: Negative.    Cardiovascular: Negative.    Skin: Positive for skin lesions (wart top of right foot).   Psychiatric/Behavioral: Negative.        Objective   Vitals:    12/19/22 1131   BP: 108/66   BP Location: Left arm   Pulse: 57   Temp: 98.2 °F (36.8 °C)   TempSrc: Infrared   SpO2: 99%   Weight: 81.2 kg (179 lb)   Height: 152.4 cm (60\")       BP Readings from Last 3 Encounters:   12/19/22 108/66   06/16/22 136/70   12/16/21 140/74        Wt Readings from Last 3 Encounters:   12/19/22 81.2 kg (179 lb)   06/16/22 85 kg (187 lb 6.4 oz)   04/29/22 81.6 kg (180 lb)        Body mass index is 34.96 kg/m².  Nursing notes and vitals reviewed.    Physical Exam  Vitals and nursing note reviewed.   Constitutional:       Appearance: Normal appearance. She is well-developed.   HENT:      Head: Normocephalic and atraumatic.      Mouth/Throat:      Mouth: Mucous membranes are moist.   Eyes:      Extraocular Movements: Extraocular movements intact.      " Conjunctiva/sclera: Conjunctivae normal.   Neck:      Thyroid: No thyromegaly.   Cardiovascular:      Rate and Rhythm: Normal rate and regular rhythm.      Heart sounds: Normal heart sounds.   Pulmonary:      Effort: Pulmonary effort is normal.      Breath sounds: Normal breath sounds.   Abdominal:      Palpations: Abdomen is soft.      Tenderness: There is no abdominal tenderness.   Musculoskeletal:      Cervical back: Neck supple. No rigidity.      Right lower leg: No edema.      Left lower leg: No edema.   Skin:     General: Skin is warm and dry.   Neurological:      General: No focal deficit present.      Mental Status: She is alert and oriented to person, place, and time.   Psychiatric:         Mood and Affect: Mood normal.         Behavior: Behavior normal.         Recent Results (from the past 672 hour(s))   Comprehensive Metabolic Panel    Collection Time: 12/12/22  9:24 AM    Specimen: Blood   Result Value Ref Range    Glucose 97 65 - 99 mg/dL    BUN 13 6 - 20 mg/dL    Creatinine 0.82 0.57 - 1.00 mg/dL    EGFR Result 83.0 >60.0 mL/min/1.73    BUN/Creatinine Ratio 15.9 7.0 - 25.0    Sodium 138 136 - 145 mmol/L    Potassium 4.1 3.5 - 5.2 mmol/L    Chloride 104 98 - 107 mmol/L    Total CO2 23.9 22.0 - 29.0 mmol/L    Calcium 9.1 8.6 - 10.5 mg/dL    Total Protein 6.2 6.0 - 8.5 g/dL    Albumin 4.30 3.50 - 5.20 g/dL    Globulin 1.9 gm/dL    A/G Ratio 2.3 g/dL    Total Bilirubin 0.3 0.0 - 1.2 mg/dL    Alkaline Phosphatase 93 39 - 117 U/L    AST (SGOT) 13 1 - 32 U/L    ALT (SGPT) 18 1 - 33 U/L   Hemoglobin A1c    Collection Time: 12/12/22  9:24 AM    Specimen: Blood   Result Value Ref Range    Hemoglobin A1C 5.30 4.80 - 5.60 %   Lipid Panel With / Chol / HDL Ratio    Collection Time: 12/12/22  9:24 AM    Specimen: Blood   Result Value Ref Range    Total Cholesterol 205 (H) 0 - 200 mg/dL    Triglycerides 117 0 - 150 mg/dL    HDL Cholesterol 49 40 - 60 mg/dL    VLDL Cholesterol Dusty 21 5 - 40 mg/dL    LDL Chol Calc  (NIH) 135 (H) 0 - 100 mg/dL    Chol/HDL Ratio 4.18    TSH    Collection Time: 12/12/22  9:24 AM    Specimen: Blood   Result Value Ref Range    TSH 1.040 0.270 - 4.200 uIU/mL   Vitamin B12    Collection Time: 12/12/22  9:24 AM    Specimen: Blood   Result Value Ref Range    Vitamin B-12 >2000 (H) 211 - 946 pg/mL   Vitamin D,25-Hydroxy    Collection Time: 12/12/22  9:24 AM    Specimen: Blood   Result Value Ref Range    25 Hydroxy, Vitamin D 36.8 30.0 - 100.0 ng/ml   T4, Free    Collection Time: 12/12/22  9:24 AM    Specimen: Blood   Result Value Ref Range    Free T4 1.38 0.93 - 1.70 ng/dL   CBC & Differential    Collection Time: 12/12/22  9:24 AM   Result Value Ref Range    WBC 5.84 3.40 - 10.80 10*3/mm3    RBC 4.16 3.77 - 5.28 10*6/mm3    Hemoglobin 13.2 12.0 - 15.9 g/dL    Hematocrit 40.0 34.0 - 46.6 %    MCV 96.2 79.0 - 97.0 fL    MCH 31.7 26.6 - 33.0 pg    MCHC 33.0 31.5 - 35.7 g/dL    RDW 12.6 12.3 - 15.4 %    Platelets 268 140 - 450 10*3/mm3    Neutrophil Rel % 60.1 42.7 - 76.0 %    Lymphocyte Rel % 28.3 19.6 - 45.3 %    Monocyte Rel % 7.2 5.0 - 12.0 %    Eosinophil Rel % 3.3 0.3 - 6.2 %    Basophil Rel % 0.9 0.0 - 1.5 %    Neutrophils Absolute 3.52 1.70 - 7.00 10*3/mm3    Lymphocytes Absolute 1.65 0.70 - 3.10 10*3/mm3    Monocytes Absolute 0.42 0.10 - 0.90 10*3/mm3    Eosinophils Absolute 0.19 0.00 - 0.40 10*3/mm3    Basophils Absolute 0.05 0.00 - 0.20 10*3/mm3    Immature Granulocyte Rel % 0.2 0.0 - 0.5 %    Immature Grans Absolute 0.01 0.00 - 0.05 10*3/mm3    nRBC 0.0 0.0 - 0.2 /100 WBC         Assessment & Plan   Diagnoses and all orders for this visit:    1. Depression with anxiety (Primary)  -     gabapentin (NEURONTIN) 300 MG capsule; Take 1 capsule by mouth 2 (Two) Times a Day.  Dispense: 60 capsule; Refill: 5    2. OAB (overactive bladder)    3. Recurrent herpes labialis    4. Myalgia    5. Hypothyroidism, unspecified type  -     TSH; Future  -     T4, Free; Future    6. Gastroesophageal reflux disease  with esophagitis without hemorrhage    7. Ventricular premature beats    8. Routine health maintenance  -     Cologuard - Stool, Per Rectum; Future  -     FluLaval/Fluzone >6 mos ([id number])  -     Comprehensive Metabolic Panel; Future  -     Hemoglobin A1c; Future  -     Lipid Panel With / Chol / HDL Ratio; Future  -     Vitamin B12; Future  -     Vitamin D,25-Hydroxy; Future  -     CBC Auto Differential; Future    1. Depression with anxiety.  Controlled with bupropion and escitalopram.    2. OAB.  Controlled with dietary measures and oxybutynin.    3. Recurrent herpes labialis.  No flare.  Continue prn acyclovir.    4. Myalgias.  Doing well with gabapentin.    5. Hypothyroidism.  Adequate replacement.  Labs reviewed.    6. GERD, esophageal dyskinesia.  Controlled with lansoprazole, prn famotidine, trazodone.    7. PVCs.  Symptoms improved with metoprolol.  Continue same.    8. RHM.  Mammogram pending next week.  Colonoscopy due 2023; she requests Cologuard this time and this is ordered.  Pap smear not needed due to hysterectomy.  Covid-19 vaccines and Tdap have been done.  Pt had dose pneumovax.  Shingrix at pharmacy.  Flu vaccine today.      Medications, including side effects, were discussed with the patient. Patient verbalized understanding.  The plan of care was discussed. All questions were answered. Patient verbalized understanding.      Return in about 6 months (around 6/19/2023) for Annual physical.

## 2022-12-30 ENCOUNTER — HOSPITAL ENCOUNTER (OUTPATIENT)
Dept: MAMMOGRAPHY | Facility: HOSPITAL | Age: 57
End: 2022-12-30

## 2023-01-12 ENCOUNTER — HOSPITAL ENCOUNTER (OUTPATIENT)
Dept: MAMMOGRAPHY | Facility: HOSPITAL | Age: 58
Discharge: HOME | End: 2023-01-12
Admitting: FAMILY MEDICINE
Payer: COMMERCIAL

## 2023-01-12 DIAGNOSIS — Z12.31 SCREENING MAMMOGRAM FOR BREAST CANCER: ICD-10-CM

## 2023-01-12 PROCEDURE — 77067 SCR MAMMO BI INCL CAD: CPT

## 2023-01-12 PROCEDURE — 77063 BREAST TOMOSYNTHESIS BI: CPT

## 2023-01-16 NOTE — TELEPHONE ENCOUNTER
Rx Refill Note  Requested Prescriptions     Pending Prescriptions Disp Refills   • metoprolol tartrate (LOPRESSOR) 25 MG tablet 180 tablet 1     Sig: Take 1 tablet by mouth 2 (Two) Times a Day.      Last office visit with prescribing clinician: 12/19/2022   Last telemedicine visit with prescribing clinician: 6/12/2023   Next office visit with prescribing clinician: 6/19/2023                         Would you like a call back once the refill request has been completed: [] Yes [] No    If the office needs to give you a call back, can they leave a voicemail: [] Yes [] No    Viktoriya Rizvi MA  01/16/23, 11:17 EST

## 2023-03-30 ENCOUNTER — OFFICE VISIT (OUTPATIENT)
Dept: INTERNAL MEDICINE | Facility: CLINIC | Age: 58
End: 2023-03-30
Payer: COMMERCIAL

## 2023-03-30 VITALS
DIASTOLIC BLOOD PRESSURE: 78 MMHG | OXYGEN SATURATION: 97 % | BODY MASS INDEX: 38.36 KG/M2 | TEMPERATURE: 98.7 F | HEIGHT: 60 IN | HEART RATE: 58 BPM | SYSTOLIC BLOOD PRESSURE: 116 MMHG | WEIGHT: 195.4 LBS

## 2023-03-30 DIAGNOSIS — M54.50 ACUTE RIGHT-SIDED LOW BACK PAIN WITHOUT SCIATICA: Primary | ICD-10-CM

## 2023-03-30 PROCEDURE — 99213 OFFICE O/P EST LOW 20 MIN: CPT | Performed by: FAMILY MEDICINE

## 2023-03-30 RX ORDER — QUINIDINE SULFATE 200 MG
TABLET ORAL
COMMUNITY

## 2023-03-30 RX ORDER — CYCLOBENZAPRINE HCL 5 MG
5-10 TABLET ORAL 3 TIMES DAILY PRN
Qty: 45 TABLET | Refills: 1 | Status: SHIPPED | OUTPATIENT
Start: 2023-03-30

## 2023-03-30 NOTE — PROGRESS NOTES
Subjective   Aundrea Mcgarry is a 57 y.o. female presenting today for follow up of   Chief Complaint   Patient presents with   • Back Pain     Intermittent back pain to the right side of spine. Pain is making her nauseas  at times. Pain for 3 weeks, gradually getting more often.  Also, has paper work for disabiltiy       History of Present Illness     Pt presents with the complaint of intermittent right low back pain X 3 weeks.  Her daughter and granddaughter moved in two months ago and she has been lifting her 1 year old granddaughter.  No known injury.  No dysuria, hematuria, fever.  When the pain is intense, she feels nauseated.  Denies pain in the leg, numbness, tingling, bowel/bladder changes.    Patient Active Problem List   Diagnosis   • Arthritis   • Bunion   • Gastroesophageal reflux disease with esophagitis   • Dyskinesia of esophagus   • Ventricular premature beats   • Recurrent herpes labialis   • Hypothyroidism   • Chronic left shoulder pain   • Bursitis/tendonitis, shoulder   • Routine health maintenance   • Depression with anxiety   • Dysphagia   • Osteopenia of both thighs   • Hiatal hernia   • OAB (overactive bladder)   • Pulmonary nodule, right   • Umbilical hernia with obstruction, without gangrene   • Carpal tunnel syndrome on both sides   • Myalgia       Current Outpatient Medications on File Prior to Visit   Medication Sig   • acyclovir (ZOVIRAX) 400 MG tablet Take 1 tablet by mouth 5 (Five) Times a Day.   • aluminum hydroxide-mag carbonate (GAVISCON EXTRA RELIEF) 160-105 MG chewable tablet chewable tablet Chew 1-2 tablets As Needed.   • buPROPion XL (WELLBUTRIN XL) 300 MG 24 hr tablet Take 1 tablet by mouth Every Morning.   • escitalopram (LEXAPRO) 20 MG tablet Take 1 tablet by mouth Daily.   • famotidine (PEPCID) 10 MG tablet Take 1 tablet by mouth 2 (Two) Times a Day As Needed.   • gabapentin (NEURONTIN) 300 MG capsule Take 1 capsule by mouth 2 (Two) Times a Day.   • ibuprofen  (ADVIL,MOTRIN) 200 MG tablet Take 3 tablets by mouth Every 6 (Six) Hours As Needed for Mild Pain.   • lansoprazole (PREVACID) 30 MG capsule Take 1 capsule by mouth 2 (Two) Times a Day.   • levothyroxine (SYNTHROID, LEVOTHROID) 112 MCG tablet Take 1 tablet by mouth Daily.   • metoprolol tartrate (LOPRESSOR) 25 MG tablet Take 1 tablet by mouth 2 (Two) Times a Day.   • MULTIPLE VITAMIN PO Take 1 tablet by mouth Daily.   • oxybutynin XL (DITROPAN-XL) 5 MG 24 hr tablet Take 1 tablet by mouth Daily.   • traZODone (DESYREL) 100 MG tablet Take 1 tablet by mouth Every Night.   • vitamin B-12 (CYANOCOBALAMIN) 100 MCG tablet Take 50 mcg by mouth Daily.   • Coenzyme Q10 (Co Q-10) 400 MG capsule Take  by mouth.   • [DISCONTINUED] amoxicillin-clavulanate (AUGMENTIN) 875-125 MG per tablet Take 1 tablet by mouth 2 (Two) Times a Day.   • [DISCONTINUED] Benzocaine-Menthol (Cepacol) 15-2.3 MG lozenge Dissolve 1 lozenge in the mouth 4 (Four) Times a Day.   • [DISCONTINUED] benzonatate (TESSALON) 200 MG capsule Take 1 capsule by mouth 3 (Three) Times a Day As Needed for Cough.   • [DISCONTINUED] CBD (cannabidiol) oral oil    • [DISCONTINUED] cetirizine-pseudoephedrine (ZyrTEC-D) 5-120 MG per 12 hr tablet Take 1 tablet by mouth 2 (Two) Times a Day.   • [DISCONTINUED] fluconazole (DIFLUCAN) 200 MG tablet    • [DISCONTINUED] promethazine-dextromethorphan (PROMETHAZINE-DM) 6.25-15 MG/5ML syrup Take 5 mL by mouth 4 (Four) Times a Day As Needed for Cough.     No current facility-administered medications on file prior to visit.          The following portions of the patient's history were reviewed and updated as appropriate: allergies, current medications, past family history, past medical history, past social history, past surgical history and problem list.    Review of Systems   Constitutional: Negative.  Negative for fever.   Musculoskeletal: Positive for back pain.   Neurological: Negative for weakness and numbness.       Objective  "  Vitals:    03/30/23 1411   BP: 116/78   BP Location: Left arm   Pulse: 58   Temp: 98.7 °F (37.1 °C)   TempSrc: Infrared   SpO2: 97%   Weight: 88.6 kg (195 lb 6.4 oz)   Height: 152.4 cm (60\")       BP Readings from Last 3 Encounters:   03/30/23 116/78   01/16/23 120/79   12/19/22 108/66        Wt Readings from Last 3 Encounters:   03/30/23 88.6 kg (195 lb 6.4 oz)   01/16/23 83.9 kg (185 lb)   12/19/22 81.2 kg (179 lb)        Body mass index is 38.16 kg/m².  Nursing notes and vitals reviewed.    Physical Exam  Vitals and nursing note reviewed.   Constitutional:       Appearance: Normal appearance.   HENT:      Head: Normocephalic and atraumatic.      Mouth/Throat:      Mouth: Mucous membranes are moist.   Eyes:      Extraocular Movements: Extraocular movements intact.      Conjunctiva/sclera: Conjunctivae normal.   Pulmonary:      Effort: Pulmonary effort is normal. No respiratory distress.   Musculoskeletal:      Cervical back: Neck supple. No rigidity.      Lumbar back: Spasms and tenderness present. No bony tenderness. Normal range of motion. Negative right straight leg raise test and negative left straight leg raise test.        Back:       Comments: Spasm and tenderness   Skin:     General: Skin is warm and dry.   Neurological:      General: No focal deficit present.      Mental Status: She is alert and oriented to person, place, and time.      Sensory: Sensation is intact.      Motor: Motor function is intact.      Coordination: Coordination is intact.      Gait: Gait is intact.      Deep Tendon Reflexes: Reflexes are normal and symmetric.   Psychiatric:         Mood and Affect: Mood normal.         Behavior: Behavior normal.         No results found for this or any previous visit (from the past 672 hour(s)).      Assessment & Plan   Diagnoses and all orders for this visit:    1. Acute right-sided low back pain without sciatica (Primary)    Other orders  -     cyclobenzaprine (FLEXERIL) 5 MG tablet; Take 1-2 " "tablets by mouth 3 (Three) Times a Day As Needed for Muscle Spasms.  Dispense: 45 tablet; Refill: 1    No \"red flag\" s/sxs.  Add muscle relaxant.  Pt referred for physical therapy.  F/U 4 weeks.          Medications, including side effects, were discussed with the patient. Patient verbalized understanding.  The plan of care was discussed. All questions were answered. Patient verbalized understanding.      Return in about 4 weeks (around 4/27/2023).              "

## 2023-04-03 DIAGNOSIS — F41.8 DEPRESSION WITH ANXIETY: ICD-10-CM

## 2023-04-03 NOTE — TELEPHONE ENCOUNTER
Rx Refill Note  Requested Prescriptions     Pending Prescriptions Disp Refills   • escitalopram (LEXAPRO) 20 MG tablet 90 tablet 1     Sig: Take 1 tablet by mouth Daily.      Last office visit with prescribing clinician: 3/30/2023   Last telemedicine visit with prescribing clinician: 4/27/2023   Next office visit with prescribing clinician: 4/27/2023                         Would you like a call back once the refill request has been completed: [] Yes [] No    If the office needs to give you a call back, can they leave a voicemail: [] Yes [] No    Eneida Banegas MA  04/03/23, 15:53 EDT

## 2023-04-04 RX ORDER — ESCITALOPRAM OXALATE 20 MG/1
20 TABLET ORAL DAILY
Qty: 90 TABLET | Refills: 1 | Status: SHIPPED | OUTPATIENT
Start: 2023-04-04

## 2023-04-24 ENCOUNTER — TELEPHONE (OUTPATIENT)
Dept: INTERNAL MEDICINE | Facility: CLINIC | Age: 58
End: 2023-04-24
Payer: COMMERCIAL

## 2023-04-26 NOTE — PROGRESS NOTES
New Shoulder      Patient: Aundrea Mcgarry        YOB: 1965    Medical Record Number: [id number]        Chief Complaints:   Bilateral shoulder pain left greater than right    History of Present Illness: This is a 57-year-old female who presents complaining of bilateral shoulder pain left greater than right she is right-hand dominant is been ongoing for few months I last saw her a year ago she was doing great but she states her daughter and granddaughter moved back in with her.  Her granddaughter is 1 years old and she does have to lift her she thinks that is really what irritated her shoulders there was not 1 particular lift or injury her symptoms are moderate intermittent aching worse with activity somewhat better with rest her past medical history is marked for irritable bowel hypertension rheumatoid arthritis fibromyalgia anxiety and depression      Allergies:   Allergies   Allergen Reactions   • Erythromycin Nausea And Vomiting   • Lactose Intolerance (Gi) GI Intolerance   • Adhesive Tape Rash       Medications:   Home Medications:  Current Outpatient Medications on File Prior to Visit   Medication Sig   • aluminum hydroxide-mag carbonate (GAVISCON EXTRA RELIEF) 160-105 MG chewable tablet chewable tablet Chew 1-2 tablets As Needed.   • buPROPion XL (WELLBUTRIN XL) 300 MG 24 hr tablet Take 1 tablet by mouth Every Morning.   • Coenzyme Q10 (Co Q-10) 400 MG capsule Take  by mouth.   • cyclobenzaprine (FLEXERIL) 5 MG tablet Take 1-2 tablets by mouth 3 (Three) Times a Day As Needed for Muscle Spasms.   • escitalopram (LEXAPRO) 20 MG tablet Take 1 tablet by mouth Daily.   • famotidine (PEPCID) 10 MG tablet Take 1 tablet by mouth 2 (Two) Times a Day As Needed.   • gabapentin (NEURONTIN) 300 MG capsule Take 1 capsule by mouth 2 (Two) Times a Day.   • ibuprofen (ADVIL,MOTRIN) 200 MG tablet Take 3 tablets by mouth Every 6 (Six) Hours As Needed for Mild Pain.   • lansoprazole (PREVACID) 30 MG capsule  Take 1 capsule by mouth 2 (Two) Times a Day.   • levothyroxine (SYNTHROID, LEVOTHROID) 112 MCG tablet Take 1 tablet by mouth Daily.   • metoprolol tartrate (LOPRESSOR) 25 MG tablet Take 1 tablet by mouth 2 (Two) Times a Day.   • MULTIPLE VITAMIN PO Take 1 tablet by mouth Daily.   • oxybutynin XL (DITROPAN-XL) 5 MG 24 hr tablet Take 1 tablet by mouth Daily.   • traZODone (DESYREL) 100 MG tablet Take 1 tablet by mouth Every Night.   • vitamin B-12 (CYANOCOBALAMIN) 100 MCG tablet Take 50 mcg by mouth Daily.   • acyclovir (ZOVIRAX) 400 MG tablet Take 1 tablet by mouth 5 (Five) Times a Day. (Patient not taking: Reported on 4/27/2023)     No current facility-administered medications on file prior to visit.     Current Medications:  Scheduled Meds:  Continuous Infusions:No current facility-administered medications for this visit.    PRN Meds:.    Past Medical History:   Diagnosis Date   • Anxiety and depression    • Arthritis    • Broken jaw     1995     • Esophageal spasm    • Fibromyalgia    • Fibromyalgia, primary    • GERD (gastroesophageal reflux disease)    • Hypertension    • Hypothyroidism    • IBS (irritable bowel syndrome)    • Nausea     chronic   • Overactive bladder    • Pneumonia    • PONV (postoperative nausea and vomiting)    • PVC (premature ventricular contraction)    • Rheumatoid arthritis    • Umbilical hernia         Past Surgical History:   Procedure Laterality Date   • BREAST BIOPSY Right early 90's   • BUNIONECTOMY Right 09/30/2022   • CARPAL TUNNEL RELEASE  11/2021    Dr. Das   • CHOLECYSTECTOMY WITH INTRAOPERATIVE CHOLANGIOGRAM N/A 06/30/2017    Procedure: CHOLECYSTECTOMY LAPAROSCOPIC INTRAOPERATIVE CHOLANGIOGRAM;  Surgeon: David Kirkland MD;  Location: [facility];  Service:    • COLONOSCOPY     • COLONOSCOPY  2013?    DR BRAR   • ENDOSCOPY N/A 12/01/2017    Procedure: ESOPHAGOGASTRODUODENOSCOPY with esophageal dilitation to 20mm,, and tissue biopsies/ polypectomies esophagus and  "gastric;  Surgeon: Meliza Pope MD;  Location:  [facility] OR;  Service:    • EYE SURGERY Bilateral     LENS REPLACEMENT   • HIP SURGERY Left    • HYSTERECTOMY     • HYSTERECTOMY     • INGUINAL HERNIA REPAIR Left     as a child   • LASER ABLATION N/A    • MANDIBLE FRACTURE SURGERY     • TONSILLECTOMY     • UMBILICAL HERNIA REPAIR N/A 2020    Procedure: INCISIONAL HERNIA REPAIR WITH MESH;  Surgeon: David Kirkland MD;  Location:  [facility];  Service: General;  Laterality: N/A;   • UPPER GASTROINTESTINAL ENDOSCOPY N/A 2011    Normal upper endosopy - Dr. Meliza Pope        Social History     Occupational History   • Not on file   Tobacco Use   • Smoking status: Former     Packs/day: 0.25     Years: 3.00     Pack years: 0.75     Types: Cigarettes     Quit date: 2004     Years since quittin.8   • Smokeless tobacco: Never   Vaping Use   • Vaping Use: Never used   Substance and Sexual Activity   • Alcohol use: Yes     Alcohol/week: 7.0 standard drinks     Types: 7 Glasses of wine per week   • Drug use: Never   • Sexual activity: Defer      Social History     Social History Narrative   • Not on file        Family History   Problem Relation Age of Onset   • Thyroid disease Mother    • Hypertension Mother    • Diabetes Father    • Hypertension Father    • Heart disease Father    • Breast cancer Maternal Aunt    • Breast cancer Paternal Aunt    • Heart disease Brother    • Malig Hyperthermia Neg Hx              Review of Systems:     Review of Systems      Physical Exam: 57 y.o. female  General Appearance:    Alert, cooperative, in no acute distress                   Vitals:    23 1028   Temp: 97.3 °F (36.3 °C)   Weight: 87.1 kg (192 lb)   Height: 152.4 cm (60\")   PainSc:   5      Patient is alert and read ×3 no acute distress appears her above-listed at height weight and age.  Affect is normal respiratory rate is normal unlabored. Heart rate regular rate rhythm, sclera, dentition and " hearing are normal for the purpose of this exam.    Ortho Exam  Physical exam of the right shoulder reveals no overlying skin changes no lymphedema no lymphadenopathy.  Patient has active flexion 180 with mild symptoms abduction is similar external rotation is to 50 and internal rotation to the upper lumbar spine with mild symptoms.  Patient has good rotator cuff strength 4+ over 5 with isometric strength testing with pain.  Patient has a positive impingement and a positive Love sign.  Patient has good cervical range of motion which is full and asymptomatic no radicular symptoms.  Patient has a normal elbow exam.  Good distal pulses are present  Patient has pain with overhead activity and a positive Neer sign and a positive empty can sign , a positive drop arm and a definitive painful arc\    Physical exam of the left shoulder reveals no overlying skin changes no lymphedema no lymphadenopathy.  Patient has active flexion 180 with mild symptoms abduction is similar external rotation is to 50 and internal rotation to the upper lumbar spine with mild symptoms.  Patient has good rotator cuff strength 4+ over 5 with isometric strength testing with pain.  Patient has a positive impingement and a positive Love sign.  Patient has good cervical range of motion which is full and asymptomatic no radicular symptoms.  Patient has a normal elbow exam.  Good distal pulses are presentPatient has pain with overhead activity and a positive Neer sign and a positive empty can sign  They have a positive drop arm any definitive painful arc    Large Joint Arthrocentesis: R subacromial bursa  Date/Time: 4/27/2023 10:53 AM  Consent given by: patient  Site marked: site marked  Timeout: Immediately prior to procedure a time out was called to verify the correct patient, procedure, equipment, support staff and site/side marked as required   Supporting Documentation  Indications: pain   Procedure Details  Location: shoulder - R subacromial  bursa  Preparation: Patient was prepped and draped in the usual sterile fashion  Needle gauge: 21G.  Approach: posterior  Medications administered: 80 mg methylPREDNISolone acetate 80 MG/ML; 2 mL lidocaine (cardiac)  Patient tolerance: patient tolerated the procedure well with no immediate complications    Large Joint Arthrocentesis: L subacromial bursa  Date/Time: 4/27/2023 10:53 AM  Consent given by: patient  Site marked: site marked  Timeout: Immediately prior to procedure a time out was called to verify the correct patient, procedure, equipment, support staff and site/side marked as required   Supporting Documentation  Indications: pain   Procedure Details  Location: shoulder - L subacromial bursa  Preparation: Patient was prepped and draped in the usual sterile fashion  Needle gauge: 21G.  Approach: posterior  Medications administered: 80 mg methylPREDNISolone acetate 80 MG/ML; 2 mL lidocaine (cardiac)  Patient tolerance: patient tolerated the procedure well with no immediate complications                Radiology:   AP, Scapular Y and Axillary Lateral of the left shoulder were ordered/reviewed to evauate shoulder pain.  I have compared x-rays done in 2021.  On the right she had x-rays done April 29 of last year not quite a year we did not repeat those.  The left shows some acromioclavicular arthritis no significant change from last x-rays the right has a similar finding  Imaging Results (Most Recent)     Procedure Component Value Units Date/Time    XR Shoulder 2+ View Left [[id number]] Resulted: 04/27/23 0951     Updated: 04/27/23 0951    Impression:      Ordering physician's impression is located in the Encounter Note dated 04/27/23. X-ray performed in the DR room.          Assessment/Plan: Bilateral shoulder pain I think this is impingement I think she probably irritated her rotator cuff probably from lifting her granddaughter plan is to proceed with injections as a diagnostic and therapeutic tool I also  think therapy would be of great benefit for her in order to get stronger to do things she wants to do.  Should she fail to improve with that we will pursue other means of testing I think doing 2 injections there is a slightly higher risk with the injections but reasonable risk to except she understands these  Cortisone Injection. See procedure note.  Cortisone Injection for DIAGNOSTIC and THERAPUTIC purposes.

## 2023-04-27 ENCOUNTER — OFFICE VISIT (OUTPATIENT)
Dept: ORTHOPEDIC SURGERY | Facility: CLINIC | Age: 58
End: 2023-04-27
Payer: COMMERCIAL

## 2023-04-27 VITALS — BODY MASS INDEX: 37.69 KG/M2 | TEMPERATURE: 97.3 F | HEIGHT: 60 IN | WEIGHT: 192 LBS

## 2023-04-27 DIAGNOSIS — M75.40 IMPINGEMENT SYNDROME OF SHOULDER REGION, UNSPECIFIED LATERALITY: ICD-10-CM

## 2023-04-27 DIAGNOSIS — M25.512 LEFT SHOULDER PAIN, UNSPECIFIED CHRONICITY: Primary | ICD-10-CM

## 2023-04-27 RX ORDER — METHYLPREDNISOLONE ACETATE 80 MG/ML
80 INJECTION, SUSPENSION INTRA-ARTICULAR; INTRALESIONAL; INTRAMUSCULAR; SOFT TISSUE
Status: COMPLETED | OUTPATIENT
Start: 2023-04-27 | End: 2023-04-27

## 2023-04-27 RX ADMIN — METHYLPREDNISOLONE ACETATE 80 MG: 80 INJECTION, SUSPENSION INTRA-ARTICULAR; INTRALESIONAL; INTRAMUSCULAR; SOFT TISSUE at 10:53

## 2023-05-12 NOTE — TELEPHONE ENCOUNTER
Rx Refill Note  Requested Prescriptions     Pending Prescriptions Disp Refills   • metoprolol tartrate (LOPRESSOR) 25 MG tablet 180 tablet 1     Sig: Take 1 tablet by mouth 2 (Two) Times a Day.      Last office visit with prescribing clinician: 3/30/2023   Last telemedicine visit with prescribing clinician: 4/24/2023   Next office visit with prescribing clinician: 6/19/2023                         Would you like a call back once the refill request has been completed: [] Yes [] No    If the office needs to give you a call back, can they leave a voicemail: [] Yes [] No    Eneida Banegas MA  05/12/23, 14:13 EDT

## 2023-05-22 DIAGNOSIS — K22.4 ESOPHAGEAL SPASM: Primary | ICD-10-CM

## 2023-05-31 RX ORDER — OFLOXACIN 3 MG/ML
SOLUTION/ DROPS OPHTHALMIC
COMMUNITY
Start: 2023-04-24

## 2023-05-31 NOTE — TELEPHONE ENCOUNTER
Rx Refill Note  Requested Prescriptions     Pending Prescriptions Disp Refills   • acyclovir (ZOVIRAX) 400 MG tablet 30 tablet 4     Sig: Take 1 tablet by mouth 5 (Five) Times a Day.      Last office visit with prescribing clinician: 3/30/2023   Last telemedicine visit with prescribing clinician: Visit date not found   Next office visit with prescribing clinician: 6/19/2023                         Would you like a call back once the refill request has been completed: [] Yes [] No    If the office needs to give you a call back, can they leave a voicemail: [] Yes [] No    Tyrell Lr  05/31/23, 14:19 EDT

## 2023-06-01 RX ORDER — ACYCLOVIR 400 MG/1
400 TABLET ORAL
Qty: 30 TABLET | Refills: 4 | Status: SHIPPED | OUTPATIENT
Start: 2023-06-01

## 2023-06-15 DIAGNOSIS — F41.8 DEPRESSION WITH ANXIETY: ICD-10-CM

## 2023-06-15 RX ORDER — TRAZODONE HYDROCHLORIDE 100 MG/1
100 TABLET ORAL NIGHTLY
Qty: 90 TABLET | Refills: 1 | Status: SHIPPED | OUTPATIENT
Start: 2023-06-15

## 2023-06-15 RX ORDER — GABAPENTIN 300 MG/1
300 CAPSULE ORAL 2 TIMES DAILY
Qty: 60 CAPSULE | Refills: 5 | Status: SHIPPED | OUTPATIENT
Start: 2023-06-15

## 2023-06-15 NOTE — TELEPHONE ENCOUNTER
Rx Refill Note  Requested Prescriptions     Pending Prescriptions Disp Refills    traZODone (DESYREL) 100 MG tablet 90 tablet 1     Sig: Take 1 tablet by mouth Every Night.      Last office visit with prescribing clinician: 3/30/2023   Last telemedicine visit with prescribing clinician: Visit date not found   Next office visit with prescribing clinician: 6/19/2023                         Would you like a call back once the refill request has been completed: [] Yes [] No    If the office needs to give you a call back, can they leave a voicemail: [] Yes [] No    Eneida Banegas MA  06/15/23, 13:31 EDT

## 2023-06-15 NOTE — TELEPHONE ENCOUNTER
Rx Refill Note  Requested Prescriptions     Pending Prescriptions Disp Refills    gabapentin (NEURONTIN) 300 MG capsule 60 capsule 5     Sig: Take 1 capsule by mouth 2 (Two) Times a Day.      Last office visit with prescribing clinician: 3/30/2023   Last telemedicine visit with prescribing clinician: Visit date not found   Next office visit with prescribing clinician: 6/19/2023                         Would you like a call back once the refill request has been completed: [] Yes [] No    If the office needs to give you a call back, can they leave a voicemail: [] Yes [] No    Eneida Banegas MA  06/15/23, 13:40 EDT

## 2023-06-16 ENCOUNTER — OFFICE VISIT (OUTPATIENT)
Dept: GASTROENTEROLOGY | Facility: CLINIC | Age: 58
End: 2023-06-16
Payer: COMMERCIAL

## 2023-06-16 VITALS
HEIGHT: 60 IN | WEIGHT: 193.8 LBS | SYSTOLIC BLOOD PRESSURE: 130 MMHG | BODY MASS INDEX: 38.05 KG/M2 | DIASTOLIC BLOOD PRESSURE: 88 MMHG

## 2023-06-16 DIAGNOSIS — K22.4 ESOPHAGEAL SPASM: Primary | ICD-10-CM

## 2023-06-16 DIAGNOSIS — K21.00 GASTROESOPHAGEAL REFLUX DISEASE WITH ESOPHAGITIS, UNSPECIFIED WHETHER HEMORRHAGE: ICD-10-CM

## 2023-06-16 DIAGNOSIS — Z12.11 ENCOUNTER FOR SCREENING FOR MALIGNANT NEOPLASM OF COLON: ICD-10-CM

## 2023-06-16 RX ORDER — FAMOTIDINE 40 MG/1
40 TABLET, FILM COATED ORAL 2 TIMES DAILY
Qty: 90 TABLET | Refills: 3 | Status: SHIPPED | OUTPATIENT
Start: 2023-06-16

## 2023-06-16 NOTE — PATIENT INSTRUCTIONS
Lansoprazole Morning and dinner  Pepcid lunch and bedtime  Miralax/Stool softener for more regular BM

## 2023-06-16 NOTE — PROGRESS NOTES
PATIENT INFORMATION  Aundrea Mcgarry       - 1965    CHIEF COMPLAINT  Chief Complaint   Patient presents with    Difficulty Swallowing    Esophageal Spasms       HISTORY OF PRESENT ILLNESS    Here today for evaluation of GERD    Prevacid BID, pepcid for breakthrough, gaviscon for spasms with some relief. Can happen several days in a row, then go a week without. Can awake her from her sleep with pain. Was on dexilant samples for years and it worked pretty well and then started having breakthrough. On prevacid can eat more foods, but still has to be careful. More stress the last 3 months, spasms more frequent. Has to use straw for cold beverages because difficulty swallowing. Some foods like corn can cause issues, meat has to cut small and intermittent difficulty and worse on spasm days, slow to go down, but never coughs up. Was better after last EGD, worsening the last few years. Globus persistent the last week. PRN ibuprofen.    BM can skip 3 days between BM, milk causes diarrhea, and usually no straining or hard BM, does have some cramping occasionally, if hard stool can have small amount of blood. Not taking anything for bowels.    Physical Monday, TSH elevated. Last check normal in December.    2017 last EGD with dilatation, no significant trauma, chemical gastris, reflux esophagitis, no HP or dysplasia  12/10/2013 normal colon Dr. Pope, no family hx CRC or polyps.     Difficulty Swallowing  Associated symptoms include coughing. Pertinent negatives include no abdominal pain, nausea or vomiting.     REVIEWED PERTINENT RESULTS/ LABS  Lab Results   Component Value Date    CASEREPORT  2022     Surgical Pathology Report                         Case: [id number]                                  Authorizing Provider:  Kain Jarvis,    Collected:           2022 09:16 AM                                 CEDRICK                                                                           Ordering Location:     [facility]  Received:            09/30/2022 11:00 AM                                 LABORATORY                                                                   Pathologist:           Denis Esteban MD                                                          Specimen:    Foot, Right, Wart right foot                                                               FINALDX  09/30/2022     1. Skin, Right Foot, Excision:   A. Consistent with verruca vulgaris.    [facility]        Lab Results   Component Value Date    HGB 13.2 06/12/2023    MCV 97.5 (H) 06/12/2023     06/12/2023    ALT 23 06/12/2023    AST 16 06/12/2023    HGBA1C 5.40 06/12/2023    INR 1.0 09/13/2022    TRIG 130 06/12/2023    FERRITIN 142.00 03/08/2021      No results found.    REVIEW OF SYSTEMS  Review of Systems   Constitutional: Negative.    HENT:  Positive for trouble swallowing.    Eyes: Negative.    Respiratory:  Positive for cough and choking.    Cardiovascular: Negative.    Gastrointestinal:  Positive for constipation. Negative for abdominal pain, diarrhea, nausea and vomiting.        Esophageal Spasms   Endocrine: Negative.    Genitourinary: Negative.    Musculoskeletal:  Positive for back pain.   Skin: Negative.    Allergic/Immunologic: Negative.    Neurological:  Positive for dizziness and light-headedness.   Hematological:  Bruises/bleeds easily.   Psychiatric/Behavioral: Negative.         ACTIVE PROBLEMS  Patient Active Problem List    Diagnosis     Myalgia [M79.10]     Carpal tunnel syndrome on both sides [G56.03]     Umbilical hernia with obstruction, without gangrene [K42.0]     Pulmonary nodule, right [R91.1]     OAB (overactive bladder) [N32.81]     Hiatal hernia [K44.9]     Osteopenia of both thighs [M85.851, M85.852]     Dysphagia [R13.10]     Depression with anxiety [F41.8]     Routine health maintenance [Z00.00]     Bursitis/tendonitis, shoulder [BBA2706]     Chronic left shoulder pain  [M25.512, G89.29]     Arthritis [M19.90]     Bunion [M21.619]     Gastroesophageal reflux disease with esophagitis [K21.00]     Dyskinesia of esophagus [K22.4]     Ventricular premature beats [I49.3]     Recurrent herpes labialis [B00.1]     Hypothyroidism [E03.9]          PAST MEDICAL HISTORY  Past Medical History:   Diagnosis Date    Anxiety and depression     Arthritis     Broken jaw     1995      Esophageal spasm     Fibromyalgia     Fibromyalgia, primary     GERD (gastroesophageal reflux disease)     Hypertension     Hypothyroidism     IBS (irritable bowel syndrome)     Nausea     chronic    Overactive bladder     Pneumonia     PONV (postoperative nausea and vomiting)     PVC (premature ventricular contraction)     Rheumatoid arthritis     Umbilical hernia          SURGICAL HISTORY  Past Surgical History:   Procedure Laterality Date    BREAST BIOPSY Right early 90's    BUNIONECTOMY Right 09/30/2022    CARPAL TUNNEL RELEASE  11/2021    Dr. Das    CHOLECYSTECTOMY WITH INTRAOPERATIVE CHOLANGIOGRAM N/A 06/30/2017    Procedure: CHOLECYSTECTOMY LAPAROSCOPIC INTRAOPERATIVE CHOLANGIOGRAM;  Surgeon: David Kirkland MD;  Location: [facility] OR [facility] [place];  Service:     COLONOSCOPY      COLONOSCOPY  2013?    DR BRAR    ENDOSCOPY N/A 12/01/2017    Procedure: ESOPHAGOGASTRODUODENOSCOPY with esophageal dilitation to 20mm,, and tissue biopsies/ polypectomies esophagus and gastric;  Surgeon: Meliza Pope MD;  Location: [facility] OR;  Service:     EYE SURGERY Bilateral     LENS REPLACEMENT    HIP SURGERY Left     HYSTERECTOMY      HYSTERECTOMY  2002    INGUINAL HERNIA REPAIR Left     as a child    LASER ABLATION N/A 2002    MANDIBLE FRACTURE SURGERY      TONSILLECTOMY      UMBILICAL HERNIA REPAIR N/A 07/31/2020    Procedure: INCISIONAL HERNIA REPAIR WITH MESH;  Surgeon: David Kirkland MD;  Location: [facility] OR [facility] [place];  Service: General;  Laterality: N/A;    UPPER GASTROINTESTINAL ENDOSCOPY N/A 07/20/2011    Normal upper endosopy -  Dr. Meliza Pope         FAMILY HISTORY  Family History   Problem Relation Age of Onset    Thyroid disease Mother     Hypertension Mother     Diabetes Father     Hypertension Father     Heart disease Father     Breast cancer Maternal Aunt     Breast cancer Paternal Aunt     Heart disease Brother     Malig Hyperthermia Neg Hx          SOCIAL HISTORY  Social History     Occupational History    Not on file   Tobacco Use    Smoking status: Former     Packs/day: 0.25     Years: 3.00     Pack years: 0.75     Types: Cigarettes     Quit date: 2004     Years since quittin.9     Passive exposure: Past    Smokeless tobacco: Never   Vaping Use    Vaping Use: Never used   Substance and Sexual Activity    Alcohol use: Yes     Alcohol/week: 7.0 standard drinks     Types: 7 Glasses of wine per week    Drug use: Never    Sexual activity: Defer         CURRENT MEDICATIONS    Current Outpatient Medications:     acyclovir (ZOVIRAX) 400 MG tablet, Take 1 tablet by mouth 5 (Five) Times a Day., Disp: 30 tablet, Rfl: 4    aluminum hydroxide-mag carbonate (GAVISCON EXTRA RELIEF) 160-105 MG chewable tablet chewable tablet, Chew 1-2 tablets As Needed., Disp: , Rfl:     buPROPion XL (WELLBUTRIN XL) 300 MG 24 hr tablet, Take 1 tablet by mouth Every Morning., Disp: 90 tablet, Rfl: 1    CBD (cannabidiol) oral oil, Take  by mouth Daily., Disp: , Rfl:     cyclobenzaprine (FLEXERIL) 5 MG tablet, Take 1-2 tablets by mouth 3 (Three) Times a Day As Needed for Muscle Spasms., Disp: 45 tablet, Rfl: 1    escitalopram (LEXAPRO) 20 MG tablet, Take 1 tablet by mouth Daily., Disp: 90 tablet, Rfl: 1    gabapentin (NEURONTIN) 300 MG capsule, Take 1 capsule by mouth 2 (Two) Times a Day., Disp: 60 capsule, Rfl: 5    ibuprofen (ADVIL,MOTRIN) 200 MG tablet, Take 3 tablets by mouth Every 6 (Six) Hours As Needed for Mild Pain., Disp: , Rfl:     lansoprazole (PREVACID) 30 MG capsule, Take 1 capsule by mouth 2 (Two) Times a Day., Disp: 180 capsule, Rfl: 3     "levothyroxine (SYNTHROID, LEVOTHROID) 112 MCG tablet, Take 1 tablet by mouth Daily., Disp: 90 tablet, Rfl: 1    metoprolol tartrate (LOPRESSOR) 25 MG tablet, Take 1 tablet by mouth 2 (Two) Times a Day., Disp: 180 tablet, Rfl: 1    MULTIPLE VITAMIN PO, Take 1 tablet by mouth Daily., Disp: , Rfl:     ofloxacin (OCUFLOX) 0.3 % ophthalmic solution, , Disp: , Rfl:     oxybutynin XL (DITROPAN-XL) 5 MG 24 hr tablet, Take 1 tablet by mouth Daily., Disp: 90 tablet, Rfl: 1    traZODone (DESYREL) 100 MG tablet, Take 1 tablet by mouth Every Night., Disp: 90 tablet, Rfl: 1    vitamin B-12 (CYANOCOBALAMIN) 100 MCG tablet, Take 50 mcg by mouth Daily., Disp: , Rfl:     famotidine (PEPCID) 40 MG tablet, Take 1 tablet by mouth 2 (Two) Times a Day. Lunch and dinner, Disp: 90 tablet, Rfl: 3    ALLERGIES  Erythromycin, Lactose intolerance (gi), and Adhesive tape    VITALS  Vitals:    06/16/23 1111   BP: 130/88   BP Location: Left arm   Patient Position: Sitting   Cuff Size: Adult   Weight: 87.9 kg (193 lb 12.8 oz)   Height: 152.4 cm (60\")       PHYSICAL EXAM  Debilities/Disabilities Identified: None  Emotional Behavior: Appropriate  Wt Readings from Last 3 Encounters:   06/16/23 87.9 kg (193 lb 12.8 oz)   04/27/23 87.1 kg (192 lb)   03/30/23 88.6 kg (195 lb 6.4 oz)     Ht Readings from Last 1 Encounters:   06/16/23 152.4 cm (60\")     Body mass index is 37.85 kg/m².  Physical Exam  Constitutional:       General: She is not in acute distress.     Appearance: Normal appearance. She is not ill-appearing.   HENT:      Head: Normocephalic and atraumatic.      Mouth/Throat:      Mouth: Mucous membranes are moist.      Pharynx: No posterior oropharyngeal erythema.   Eyes:      General: No scleral icterus.  Cardiovascular:      Rate and Rhythm: Normal rate and regular rhythm.      Heart sounds: Normal heart sounds.   Pulmonary:      Effort: Pulmonary effort is normal.      Breath sounds: Normal breath sounds.   Abdominal:      General: Abdomen " is flat. Bowel sounds are normal. There is no distension.      Palpations: Abdomen is soft. There is no mass.      Tenderness: There is no abdominal tenderness in the epigastric area, periumbilical area, left upper quadrant and left lower quadrant. There is no guarding or rebound. Negative signs include Castaneda's sign.      Hernia: No hernia is present.   Musculoskeletal:      Cervical back: Neck supple.   Skin:     General: Skin is warm.      Capillary Refill: Capillary refill takes less than 2 seconds.   Neurological:      General: No focal deficit present.      Mental Status: She is alert and oriented to person, place, and time.   Psychiatric:         Mood and Affect: Mood normal.         Behavior: Behavior normal.         Thought Content: Thought content normal.         Judgment: Judgment normal.       CLINICAL DATA REVIEWED   reviewed previous lab results and integrated with today's visit, reviewed notes from other physicians and/or last GI encounter, reviewed previous endoscopy results and available photos, reviewed surgical pathology results from previous biopsies    ASSESSMENT  Diagnoses and all orders for this visit:    Esophageal spasm  -     Case Request; Standing  -     Case Request    Gastroesophageal reflux disease with esophagitis, unspecified whether hemorrhage  -     Case Request; Standing  -     Case Request    Encounter for screening for malignant neoplasm of colon  -     Case Request; Standing  -     Case Request    Other orders  -     CBD (cannabidiol) oral oil; Take  by mouth Daily.  -     famotidine (PEPCID) 40 MG tablet; Take 1 tablet by mouth 2 (Two) Times a Day. Lunch and dinner  -     Obtain Informed Consent; Standing  -     Follow Anesthesia Guidelines / Protocol; Future          PLAN    Alternate PPI and pepcid  SS/miralax for bowels  EGD and Colon     No follow-ups on file.    I have discussed the above plan with the patient.  They verbalize understanding and are in agreement with the  plan.  They have been advised to contact the office for any questions, concerns, or changes related to their health.

## 2023-06-19 ENCOUNTER — OFFICE VISIT (OUTPATIENT)
Dept: INTERNAL MEDICINE | Facility: CLINIC | Age: 58
End: 2023-06-19
Payer: COMMERCIAL

## 2023-06-19 VITALS
SYSTOLIC BLOOD PRESSURE: 128 MMHG | WEIGHT: 193.6 LBS | HEART RATE: 63 BPM | OXYGEN SATURATION: 97 % | HEIGHT: 60 IN | DIASTOLIC BLOOD PRESSURE: 74 MMHG | BODY MASS INDEX: 38.01 KG/M2 | TEMPERATURE: 98.2 F

## 2023-06-19 DIAGNOSIS — Z00.00 ANNUAL PHYSICAL EXAM: Primary | ICD-10-CM

## 2023-06-19 DIAGNOSIS — E03.9 HYPOTHYROIDISM, UNSPECIFIED TYPE: ICD-10-CM

## 2023-06-19 PROCEDURE — 99396 PREV VISIT EST AGE 40-64: CPT | Performed by: FAMILY MEDICINE

## 2023-06-19 RX ORDER — LEVOTHYROXINE SODIUM 0.12 MG/1
125 TABLET ORAL DAILY
Qty: 90 TABLET | Refills: 1 | Status: SHIPPED | OUTPATIENT
Start: 2023-06-19

## 2023-06-19 NOTE — PROGRESS NOTES
Chief Complaint   Patient presents with    Annual Exam       Patient Name: Aundrea Guillaume is a 57 y.o. female presenting for Annual Exam      Well Adult Physical   Patient here for a comprehensive physical exam.The patient reports problems - esophageal spasms worse; working with GI.    Do you take any herbs or supplements that were not prescribed by a doctor? no   Are you taking calcium supplements? no   Are you taking aspirin daily? no     History:  LMP: No LMP recorded. Patient has had a hysterectomy.    Aundrea Mcgarry 57 y.o. female who presents for an Annual Wellness Visit.  she has a history of   Patient Active Problem List   Diagnosis    Arthritis    Bunion    Gastroesophageal reflux disease with esophagitis    Dyskinesia of esophagus    Ventricular premature beats    Recurrent herpes labialis    Hypothyroidism    Chronic left shoulder pain    Bursitis/tendonitis, shoulder    Routine health maintenance    Depression with anxiety    Dysphagia    Osteopenia of both thighs    Hiatal hernia    OAB (overactive bladder)    Pulmonary nodule, right    Umbilical hernia with obstruction, without gangrene    Carpal tunnel syndrome on both sides    Myalgia   .  she has been doing well with new interval problems.      Health Habits:  Dental Exam. not up to date - will schedule  Eye Exam. up to date  Exercise: 6 times/week.  Current exercise activities include: dancing    The following portions of the patient's history were reviewed and updated as appropriate: allergies, current medications, past family history, past medical history, past social history, past surgical history and problem list.    Review of Systems   Constitutional: Negative.    Respiratory: Negative.     Cardiovascular: Negative.    Gastrointestinal: Negative.    Endocrine: Negative.    Genitourinary: Negative.    Musculoskeletal:  Positive for arthralgias and myalgias.   Skin: Negative.    Allergic/Immunologic: Positive for  environmental allergies.   Hematological: Negative.    Psychiatric/Behavioral:  Positive for sleep disturbance.      Erythromycin, Lactose intolerance (gi), and Adhesive tape      Current Outpatient Medications:     acyclovir (ZOVIRAX) 400 MG tablet, Take 1 tablet by mouth 5 (Five) Times a Day., Disp: 30 tablet, Rfl: 4    aluminum hydroxide-mag carbonate (GAVISCON EXTRA RELIEF) 160-105 MG chewable tablet chewable tablet, Chew 1-2 tablets As Needed., Disp: , Rfl:     buPROPion XL (WELLBUTRIN XL) 300 MG 24 hr tablet, Take 1 tablet by mouth Every Morning., Disp: 90 tablet, Rfl: 1    CBD (cannabidiol) oral oil, Take  by mouth Daily., Disp: , Rfl:     cyclobenzaprine (FLEXERIL) 5 MG tablet, Take 1-2 tablets by mouth 3 (Three) Times a Day As Needed for Muscle Spasms., Disp: 45 tablet, Rfl: 1    escitalopram (LEXAPRO) 20 MG tablet, Take 1 tablet by mouth Daily., Disp: 90 tablet, Rfl: 1    famotidine (PEPCID) 40 MG tablet, Take 1 tablet by mouth 2 (Two) Times a Day. Lunch and dinner, Disp: 90 tablet, Rfl: 3    gabapentin (NEURONTIN) 300 MG capsule, Take 1 capsule by mouth 2 (Two) Times a Day., Disp: 60 capsule, Rfl: 5    ibuprofen (ADVIL,MOTRIN) 200 MG tablet, Take 3 tablets by mouth Every 6 (Six) Hours As Needed for Mild Pain., Disp: , Rfl:     lansoprazole (PREVACID) 30 MG capsule, Take 1 capsule by mouth 2 (Two) Times a Day., Disp: 180 capsule, Rfl: 3    levothyroxine (SYNTHROID, LEVOTHROID) 125 MCG tablet, Take 1 tablet by mouth Daily., Disp: 90 tablet, Rfl: 1    metoprolol tartrate (LOPRESSOR) 25 MG tablet, Take 1 tablet by mouth 2 (Two) Times a Day., Disp: 180 tablet, Rfl: 1    MULTIPLE VITAMIN PO, Take 1 tablet by mouth Daily., Disp: , Rfl:     ofloxacin (OCUFLOX) 0.3 % ophthalmic solution, , Disp: , Rfl:     oxybutynin XL (DITROPAN-XL) 5 MG 24 hr tablet, Take 1 tablet by mouth Daily., Disp: 90 tablet, Rfl: 1    traZODone (DESYREL) 100 MG tablet, Take 1 tablet by mouth Every Night., Disp: 90 tablet, Rfl: 1    vitamin  "B-12 (CYANOCOBALAMIN) 100 MCG tablet, Take 50 mcg by mouth Daily., Disp: , Rfl:     OBJECTIVE    /74 (BP Location: Left arm)   Pulse 63   Temp 98.2 °F (36.8 °C) (Infrared)   Ht 152.4 cm (60\")   Wt 87.8 kg (193 lb 9.6 oz)   SpO2 97%   BMI 37.81 kg/m²     Physical Exam  Vitals and nursing note reviewed.   Constitutional:       Appearance: Normal appearance. She is well-developed.   HENT:      Head: Normocephalic and atraumatic.      Right Ear: Tympanic membrane and external ear normal.      Left Ear: Tympanic membrane and external ear normal.      Nose: Nose normal.      Mouth/Throat:      Mouth: Mucous membranes are moist.   Eyes:      General: No scleral icterus.        Right eye: No discharge.         Left eye: No discharge.      Extraocular Movements: Extraocular movements intact.      Conjunctiva/sclera: Conjunctivae normal.      Pupils: Pupils are equal, round, and reactive to light.   Neck:      Thyroid: No thyromegaly.   Cardiovascular:      Rate and Rhythm: Normal rate and regular rhythm.      Heart sounds: Normal heart sounds. No murmur heard.    No friction rub. No gallop.   Pulmonary:      Effort: Pulmonary effort is normal. No respiratory distress.      Breath sounds: Normal breath sounds. No wheezing or rales.   Abdominal:      General: Bowel sounds are normal.      Palpations: Abdomen is soft. There is no mass.      Tenderness: There is no abdominal tenderness.      Hernia: No hernia is present.   Musculoskeletal:         General: No deformity.      Cervical back: Normal range of motion and neck supple.      Right lower leg: No edema.      Left lower leg: No edema.   Lymphadenopathy:      Cervical: No cervical adenopathy.   Skin:     General: Skin is warm and dry.      Findings: No rash.   Neurological:      General: No focal deficit present.      Mental Status: She is alert and oriented to person, place, and time.      Cranial Nerves: No cranial nerve deficit.      Motor: No abnormal muscle " tone.      Coordination: Coordination normal.      Deep Tendon Reflexes: Reflexes are normal and symmetric. Reflexes normal.   Psychiatric:         Mood and Affect: Mood normal.         Behavior: Behavior normal.         Thought Content: Thought content normal.         Judgment: Judgment normal.       Common labs          9/27/2022    10:54 12/12/2022    09:24 6/12/2023    09:17   Common Labs   Glucose 93  97  89    BUN 14  13  14    Creatinine 0.83  0.82  0.85    Sodium 133  138  138    Potassium 4.5  4.1  4.3    Chloride 98  104  102    Calcium 10.0  9.1  9.2    Total Protein  6.2  6.1    Albumin 4.20  4.30  3.8    Total Bilirubin 0.3  0.3  0.2    Alkaline Phosphatase 93  93  82    AST (SGOT) 19  13  16    ALT (SGPT) 23  18  23    WBC 6.93  5.84  6.53    Hemoglobin 14.2  13.2  13.2    Hematocrit 41.0  40.0  39.3    Platelets 256  268  237    Total Cholesterol  205  223    Triglycerides  117  130    HDL Cholesterol  49  58    LDL Cholesterol   135  142    Hemoglobin A1C  5.30  5.40         ASSESSMENT AND PLAN  Diagnoses and all orders for this visit:    1. Annual physical exam (Primary)    2. Hypothyroidism, unspecified type  -     levothyroxine (SYNTHROID, LEVOTHROID) 125 MCG tablet; Take 1 tablet by mouth Daily.  Dispense: 90 tablet; Refill: 1      Mammogram UTD.  Colonoscopy pending at the end of this month with Dr. Lorenzo. Pap smear not needed due to hysterectomy.  Covid-19 vaccines and Tdap have been done.  Pt had dose Pneumovax.  Shingrix at pharmacy.      1. Depression with anxiety.  Controlled with bupropion and escitalopram.     2. OAB.  Controlled with dietary measures and oxybutynin.     3. Recurrent herpes labialis.  No flare.  Continue prn acyclovir.     4. Myalgias.  Doing okay with gabapentin.     5. Hypothyroidism.  Under-replaced and pt symptomatic with constipation and fatigue.  Increase levothyroxine from 112 mcg to 125 mcg daily.  Recheck levels in 6 weeks.     6. GERD, esophageal  dyskinesia.  On BID lansoprazole and BID famotidine, trazodone.  She sees GI and EGD is planned for later this month.     7. PVCs.  Symptoms improved with metoprolol.  Continue same.    Discussed healthy diet, exercise, cancer screening, immunizations, and preventive care.  Hm tab updated.  Encouraged seat belt use.  No texting while driving. Orders placed as below.     Encouraged covid vaccination if not already done.  Covid vaccination can be scheduled at [email or web address]    continue current medications, continue current healthy lifestyle patterns, and return for routine annual checkups    Return in about 6 months (around 12/19/2023).

## 2023-06-30 PROBLEM — Z86.0100 HISTORY OF COLON POLYPS: Status: ACTIVE | Noted: 2023-06-30

## 2023-06-30 PROBLEM — Z86.010 HISTORY OF COLON POLYPS: Status: ACTIVE | Noted: 2023-06-30

## 2023-07-24 ENCOUNTER — TELEPHONE (OUTPATIENT)
Dept: INTERNAL MEDICINE | Facility: CLINIC | Age: 58
End: 2023-07-24

## 2023-07-24 NOTE — TELEPHONE ENCOUNTER
"    Caller: Aundrea Mcgarry \"Nicole\"    Relationship to patient: Self    Best call back number: [phone]    Patient is needing:     WAS STARTED ON SOME NEW MEDICATION DUE TO HER BEING COVID POSITIVE.      benzonatate (TESSALON) 100 MG capsule     guaifenesin-dextromethorphan (MUCINEX DM)  MG tablet sustained-release 12 hour tablet     Nirmatrelvir&Ritonavir 300/100 (PAXLOVID) 20 x 150 MG & 10 x 100MG tablet therapy pack tablet     predniSONE (DELTASONE) 50 MG tablet       WOULD LIKE TO KNOW IF THERE WILL BE ANY INTERACTIONS FROM THOSE WITH HER EXISTING MEDICATIONS.     MOSTLY WORRIED ABOUT THE PAXLOVID.  "

## 2023-07-24 NOTE — TELEPHONE ENCOUNTER
Would you be able to advise about this, are there any interactions between these medications?   never used

## 2023-07-24 NOTE — TELEPHONE ENCOUNTER
She should hold the trazodone while on Paxlovid.  Also, doesn't need to take the prednisone; it's not indicated for this.

## 2023-08-21 DIAGNOSIS — F41.8 DEPRESSION WITH ANXIETY: ICD-10-CM

## 2023-08-21 DIAGNOSIS — N32.81 OAB (OVERACTIVE BLADDER): ICD-10-CM

## 2023-08-21 RX ORDER — BUPROPION HYDROCHLORIDE 300 MG/1
300 TABLET ORAL EVERY MORNING
Qty: 90 TABLET | Refills: 1 | Status: SHIPPED | OUTPATIENT
Start: 2023-08-21

## 2023-08-21 RX ORDER — OXYBUTYNIN CHLORIDE 5 MG/1
5 TABLET, EXTENDED RELEASE ORAL DAILY
Qty: 90 TABLET | Refills: 1 | Status: SHIPPED | OUTPATIENT
Start: 2023-08-21

## 2023-08-21 NOTE — TELEPHONE ENCOUNTER
Rx Refill Note  Requested Prescriptions     Pending Prescriptions Disp Refills    oxybutynin XL (DITROPAN-XL) 5 MG 24 hr tablet 90 tablet 1     Sig: Take 1 tablet by mouth Daily.    buPROPion XL (WELLBUTRIN XL) 300 MG 24 hr tablet 90 tablet 1     Sig: Take 1 tablet by mouth Every Morning.      Last office visit with prescribing clinician: 6/19/2023   Last telemedicine visit with prescribing clinician: Visit date not found   Next office visit with prescribing clinician: 12/20/2023                         Would you like a call back once the refill request has been completed: [] Yes [] No    If the office needs to give you a call back, can they leave a voicemail: [] Yes [] No    Sparkle Trammell, PCT  08/21/23, 15:21 EDT

## 2023-08-21 NOTE — TELEPHONE ENCOUNTER
"  Caller: Aundrea Mcgarry \"Nicole\"    Relationship: Self    Best call back number:     Requested Prescriptions:   Requested Prescriptions     Pending Prescriptions Disp Refills    oxybutynin XL (DITROPAN-XL) 5 MG 24 hr tablet 90 tablet 1     Sig: Take 1 tablet by mouth Daily.    buPROPion XL (WELLBUTRIN XL) 300 MG 24 hr tablet 90 tablet 1     Sig: Take 1 tablet by mouth Every Morning.        Pharmacy where request should be sent:  [facility] [address] [facility] [address]     Last office visit with prescribing clinician: 6/19/2023   Last telemedicine visit with prescribing clinician: Visit date not found   Next office visit with prescribing clinician: 12/20/2023     Additional details provided by patient:     Does the patient have less than a 3 day supply:  [x] Yes  [] No    Would you like a call back once the refill request has been completed: [x] Yes [] No    If the office needs to give you a call back, can they leave a voicemail: [x] Yes [] No    Bret Houston   08/21/23 13:35 EDT           "

## 2023-09-06 DIAGNOSIS — R06.83 SNORING: Primary | ICD-10-CM

## 2023-09-07 ENCOUNTER — TELEPHONE (OUTPATIENT)
Dept: INTERNAL MEDICINE | Facility: CLINIC | Age: 58
End: 2023-09-07

## 2023-09-07 NOTE — TELEPHONE ENCOUNTER
Caller: TERRANCE WITH [facility]    Relationship: SLEEP STUDY SERVICES    Best call back number: [phone]     What is the best time to reach you: UNKNOWN    Who are you requesting to speak with (clinical staff, provider,  specific staff member): CLINICAL STAFF/ DR BEE OR PATIENT ASAP    Do you know the name of the person who called: TERRANCE WITH [facility]    What was the call regarding: TERRANCE WITH [facility] HAS BEEN TRYING TO REACH THE PATIENT BY PHONE, TEXT, AND VOICEMAIL AT THE CONFIRMED PATIENT'S PHONE NUMBER AND PATIENT HAS NOT RETURNED HER CALL TO SET UP A SLEEP STUDY AS REFERRED BY DR BEE AND TERRANCE IS ASKING IF SOMEONE FROM DR BEE'S OFFICE CAN CONTACT THE PATIENT TO HAVE HER GIVE TERRANCE A CALL BACK ASAP TO DO THIS    Is it okay if the provider responds through MyChart: PLEASE HAVE PATIENT GIVE TERRANCE WITH [facility] A CALL BACK TO SET UP SLEEP STUDY APPT ASAP

## 2023-09-07 NOTE — TELEPHONE ENCOUNTER
Tried to call patient to inform her of this but was unable to reach and sent the patient a message via Inaaya with the details.

## 2023-09-11 DIAGNOSIS — R06.83 SNORING: Primary | ICD-10-CM

## 2023-09-30 DIAGNOSIS — F41.8 DEPRESSION WITH ANXIETY: ICD-10-CM

## 2023-09-30 NOTE — TELEPHONE ENCOUNTER
Rx Refill Note  Requested Prescriptions     Pending Prescriptions Disp Refills    escitalopram (LEXAPRO) 20 MG tablet [Pharmacy Med Name: ESCITALOPRAM 20 MG TABLET] 90 tablet 1     Sig: TAKE ONE TABLET BY MOUTH DAILY      Last office visit with prescribing clinician: Visit date not found   Last telemedicine visit with prescribing clinician: Visit date not found   Next office visit with prescribing clinician: Visit date not found                         Would you like a call back once the refill request has been completed: [] Yes [] No    If the office needs to give you a call back, can they leave a voicemail: [] Yes [] No    Eneida Banegas MA  09/30/23, 14:31 EDT

## 2023-10-02 RX ORDER — ESCITALOPRAM OXALATE 20 MG/1
TABLET ORAL
Qty: 90 TABLET | Refills: 1 | Status: SHIPPED | OUTPATIENT
Start: 2023-10-02

## 2023-10-25 RX ORDER — ACYCLOVIR 400 MG/1
400 TABLET ORAL
Qty: 30 TABLET | Refills: 4 | Status: SHIPPED | OUTPATIENT
Start: 2023-10-25

## 2023-10-25 NOTE — TELEPHONE ENCOUNTER
Rx Refill Note  Requested Prescriptions     Pending Prescriptions Disp Refills    acyclovir (ZOVIRAX) 400 MG tablet 30 tablet 4     Sig: Take 1 tablet by mouth 5 (Five) Times a Day.      Last office visit with prescribing clinician: 6/19/2023   Last telemedicine visit with prescribing clinician: Visit date not found   Next office visit with prescribing clinician: 12/20/2023                         Would you like a call back once the refill request has been completed: [] Yes [] No    If the office needs to give you a call back, can they leave a voicemail: [] Yes [] No    Eneida Banegas MA  10/25/23, 15:02 EDT

## 2023-11-01 ENCOUNTER — OFFICE VISIT (OUTPATIENT)
Dept: SLEEP MEDICINE | Facility: HOSPITAL | Age: 58
End: 2023-11-01
Payer: MEDICARE

## 2023-11-01 VITALS
SYSTOLIC BLOOD PRESSURE: 100 MMHG | HEIGHT: 60 IN | HEART RATE: 60 BPM | BODY MASS INDEX: 37.69 KG/M2 | OXYGEN SATURATION: 94 % | DIASTOLIC BLOOD PRESSURE: 67 MMHG | WEIGHT: 192 LBS

## 2023-11-01 DIAGNOSIS — R06.81 WITNESSED EPISODE OF APNEA: ICD-10-CM

## 2023-11-01 DIAGNOSIS — E66.9 OBESITY (BMI 30-39.9): ICD-10-CM

## 2023-11-01 DIAGNOSIS — G47.10 HYPERSOMNIA: ICD-10-CM

## 2023-11-01 DIAGNOSIS — R06.83 SNORING: Primary | ICD-10-CM

## 2023-11-01 PROCEDURE — G0463 HOSPITAL OUTPT CLINIC VISIT: HCPCS

## 2023-11-01 NOTE — PROGRESS NOTES
"[facility]  Telephone: [phone] / Fax: [phone] [place]  Telephone: [phone] / Fax: [phone] [place]    Referring Physician: Spenser Hodgson MD  PCP: Spenser Hodgson MD    Reason for consult:  sleep apnea    Aundrea Mcgarry is a 58 y.o.female  was seen in the [facility] Center today for evaluation of sleep apnea. She would like to be evaluated for ANNA in view of chronic hypersomnia, fatigue, choking on saliva. She is waking up gagging. She wakes herself up jerking. She wakes up with bedding out of place. Her sleep schedule is MN-9am. She does not always feel better if she sleeps longer. She takes Gabapentin 300mg BID for fibromyalgia. She has been on it for about 2 year. She gained 30 lb in past 5 years. She does not exercise regularly.  She reports occasional sweating during sleep.  ESS 13. She takes Trazodone 100mg for \"esophageal spasms\". It helps with insomnia too.    SH- retired/desk job, 1-2 alcohol per day(with dinner), 2-3 tea/coffee per day.     ROS- +frequent urination, +anxiety/depression(controlled), irregular HR, PVCs, +nasal congestions      Aundrea Mcgarry  has a past medical history of Allergic, Anxiety and depression, Arthritis, Broken jaw, Cholelithiasis (2017), Diverticulosis, Esophageal spasm, Fibromyalgia, Fibromyalgia, primary, GERD (gastroesophageal reflux disease), Headache (2019), Hypertension, Hypothyroidism, IBS (irritable bowel syndrome), Nausea, Overactive bladder, Pneumonia, PONV (postoperative nausea and vomiting), PVC (premature ventricular contraction), Rheumatoid arthritis, and Umbilical hernia.    Current Medications:    Current Outpatient Medications:     buPROPion XL (WELLBUTRIN XL) 300 MG 24 hr tablet, Take 1 tablet by mouth Every Morning., Disp: 90 tablet, Rfl: 1    oxybutynin XL (DITROPAN-XL) 5 MG 24 hr tablet, Take 1 tablet by mouth Daily., Disp: 90 tablet, Rfl: 1    acyclovir (ZOVIRAX) 400 MG tablet, Take 1 " tablet by mouth 5 (Five) Times a Day., Disp: 30 tablet, Rfl: 4    aluminum hydroxide-mag carbonate (GAVISCON EXTRA RELIEF) 160-105 MG chewable tablet chewable tablet, Chew 1-2 tablets As Needed., Disp: , Rfl:     benzonatate (TESSALON) 100 MG capsule, Take 1-2 capsules by mouth 3 (Three) Times a Day As Needed for Cough., Disp: 30 capsule, Rfl: 0    CBD (cannabidiol) oral oil, Take  by mouth Daily., Disp: , Rfl:     cyclobenzaprine (FLEXERIL) 5 MG tablet, Take 1-2 tablets by mouth 3 (Three) Times a Day As Needed for Muscle Spasms., Disp: 45 tablet, Rfl: 1    escitalopram (LEXAPRO) 20 MG tablet, TAKE ONE TABLET BY MOUTH DAILY, Disp: 90 tablet, Rfl: 1    famotidine (PEPCID) 40 MG tablet, Take 1 tablet by mouth 2 (Two) Times a Day. Lunch and dinner, Disp: 90 tablet, Rfl: 3    gabapentin (NEURONTIN) 300 MG capsule, Take 1 capsule by mouth 2 (Two) Times a Day., Disp: 60 capsule, Rfl: 5    guaifenesin-dextromethorphan (MUCINEX DM)  MG tablet sustained-release 12 hour tablet, Take 1 tablet by mouth 2 (Two) Times a Day As Needed (cough)., Disp: 20 tablet, Rfl: 0    ibuprofen (ADVIL,MOTRIN) 200 MG tablet, Take 3 tablets by mouth Every 6 (Six) Hours As Needed for Mild Pain., Disp: , Rfl:     ipratropium (ATROVENT) 0.06 % nasal spray, 2 sprays into the nostril(s) as directed by provider 4 (Four) Times a Day., Disp: 1 each, Rfl: 0    lansoprazole (PREVACID) 30 MG capsule, Take 1 capsule by mouth 2 (Two) Times a Day., Disp: 180 capsule, Rfl: 3    levothyroxine (SYNTHROID, LEVOTHROID) 125 MCG tablet, Take 1 tablet by mouth Daily., Disp: 90 tablet, Rfl: 1    metoprolol tartrate (LOPRESSOR) 25 MG tablet, Take 1 tablet by mouth 2 (Two) Times a Day., Disp: 180 tablet, Rfl: 1    MULTIPLE VITAMIN PO, Take 1 tablet by mouth Daily., Disp: , Rfl:     Nirmatrelvir&Ritonavir 300/100 (PAXLOVID) 20 x 150 MG & 10 x 100MG tablet therapy pack tablet, Take 3 tablets by mouth 2 (Two) Times a Day., Disp: 30 tablet, Rfl: 0    ofloxacin  "(OCUFLOX) 0.3 % ophthalmic solution, , Disp: , Rfl:     predniSONE (DELTASONE) 50 MG tablet, Take 1 tablet by mouth Daily With Breakfast., Disp: 5 tablet, Rfl: 0    traZODone (DESYREL) 100 MG tablet, Take 1 tablet by mouth Every Night., Disp: 90 tablet, Rfl: 1    vitamin B-12 (CYANOCOBALAMIN) 100 MCG tablet, Take 50 mcg by mouth Daily., Disp: , Rfl:     I have reviewed Past Medical History, Past Surgical History, Medication List, Social History and Family History as entered in Sleep Questionnaire and EPIC.    ESS     Vital Signs /67   Pulse 60   Ht 152.4 cm (60\")   Wt 87.1 kg (192 lb)   SpO2 94%   BMI 37.50 kg/m²  Body mass index is 37.5 kg/m².    General Alert and oriented. No acute distress noted   Pharynx/Throat Class   Mallampati airway, large tongue, no evidence of redundant lateral pharyngeal tissue. No oral lesions. No thrush. Moist mucous membranes.   Head Normocephalic. Symmetrical. Atraumatic.    Nose No septal deviation. No drainage   Chest Wall Normal shape. Symmetric expansion with respiration. No tenderness.   Neck Trachea midline, no thyromegaly or adenopathy    Lungs Clear to auscultation bilaterally. No wheezes. No rhonchi. No rales. Respirations regular, even and unlabored.   Heart Regular rhythm and normal rate. Normal S1 and S2. No murmur   Abdomen Soft, non-tender and non-distended. Normal bowel sounds. No masses.   Extremities Moves all extremities well. No edema   Psychiatric Normal mood and affect.        Impression:  1. Snoring    2. Hypersomnia    3. Witnessed episode of apnea    4. Obesity (BMI 30-39.9)          Plan:  I discussed the pathophysiology of obstructive sleep apnea with the patient.  We discussed the adverse outcomes associated with untreated sleep-disordered breathing.  We discussed treatment modalities of obstructive sleep apnea including CPAP device. Sleep study will be scheduled to establish a definitive diagnosis of sleep disorder breathing.  Weight loss will be " strongly beneficial in order to reduce the severity of sleep-disordered breathing.  Patient has narrow oropharyngeal structure.  Caution during activities that require prolonged concentration is strongly advised.  After sleep study results are available, patient will be notified, and appointment will be scheduled to discuss sleep study results and treatment recommendations.    HST was scheduled    I appreciate the opportunity to participate in this patient's care.      RALEIGH Palmer  [facility] [place]  Phone: [phone]      Part of this note may be an electronic transcription/translation of spoken language to printed text using the Dragon Dictation System. Some errors may exist even though the document was edited.

## 2023-11-03 NOTE — PROGRESS NOTES
Patient: Aundrea Mcgarry  YOB: 1965  Date of Service: 11/3/2023    Chief Complaints: Bilateral shoulder pain    Subjective:    History of Present Illness: Pt is seen in the office today with complaints of bilateral shoulder pain we think this is rotator cuff in some fashion she does get intermittent injections we talked about doing physical therapy she is done in the past but would like to do it again think it would really help working on cuff and core strengthening.  I told her at her age we could just keep injecting if her symptoms continue to work her without investigating further with an MRI.        Allergies:   Allergies   Allergen Reactions    Erythromycin Nausea And Vomiting    Lactose Intolerance (Gi) GI Intolerance    Adhesive Tape Rash       Medications:   Home Medications:  Current Outpatient Medications on File Prior to Visit   Medication Sig    buPROPion XL (WELLBUTRIN XL) 300 MG 24 hr tablet Take 1 tablet by mouth Every Morning.    oxybutynin XL (DITROPAN-XL) 5 MG 24 hr tablet Take 1 tablet by mouth Daily.    acyclovir (ZOVIRAX) 400 MG tablet Take 1 tablet by mouth 5 (Five) Times a Day.    aluminum hydroxide-mag carbonate (GAVISCON EXTRA RELIEF) 160-105 MG chewable tablet chewable tablet Chew 1-2 tablets As Needed.    benzonatate (TESSALON) 100 MG capsule Take 1-2 capsules by mouth 3 (Three) Times a Day As Needed for Cough.    CBD (cannabidiol) oral oil Take  by mouth Daily.    cyclobenzaprine (FLEXERIL) 5 MG tablet Take 1-2 tablets by mouth 3 (Three) Times a Day As Needed for Muscle Spasms.    escitalopram (LEXAPRO) 20 MG tablet TAKE ONE TABLET BY MOUTH DAILY    famotidine (PEPCID) 40 MG tablet Take 1 tablet by mouth 2 (Two) Times a Day. Lunch and dinner    gabapentin (NEURONTIN) 300 MG capsule Take 1 capsule by mouth 2 (Two) Times a Day.    guaifenesin-dextromethorphan (MUCINEX DM)  MG tablet sustained-release 12 hour tablet Take 1 tablet by mouth 2 (Two) Times a Day As Needed  (cough).    ibuprofen (ADVIL,MOTRIN) 200 MG tablet Take 3 tablets by mouth Every 6 (Six) Hours As Needed for Mild Pain.    ipratropium (ATROVENT) 0.06 % nasal spray 2 sprays into the nostril(s) as directed by provider 4 (Four) Times a Day.    lansoprazole (PREVACID) 30 MG capsule Take 1 capsule by mouth 2 (Two) Times a Day.    levothyroxine (SYNTHROID, LEVOTHROID) 125 MCG tablet Take 1 tablet by mouth Daily.    metoprolol tartrate (LOPRESSOR) 25 MG tablet Take 1 tablet by mouth 2 (Two) Times a Day.    MULTIPLE VITAMIN PO Take 1 tablet by mouth Daily.    Nirmatrelvir&Ritonavir 300/100 (PAXLOVID) 20 x 150 MG & 10 x 100MG tablet therapy pack tablet Take 3 tablets by mouth 2 (Two) Times a Day.    ofloxacin (OCUFLOX) 0.3 % ophthalmic solution     predniSONE (DELTASONE) 50 MG tablet Take 1 tablet by mouth Daily With Breakfast.    traZODone (DESYREL) 100 MG tablet Take 1 tablet by mouth Every Night.    vitamin B-12 (CYANOCOBALAMIN) 100 MCG tablet Take 50 mcg by mouth Daily.     No current facility-administered medications on file prior to visit.     Current Medications:  Scheduled Meds:  Continuous Infusions:No current facility-administered medications for this visit.    PRN Meds:.    I have reviewed the patient's medical history in detail and updated the computerized patient record.  Review and summarization of old records include:    Past Medical History:   Diagnosis Date    Allergic     Erythromycin    Anxiety and depression     Arthritis     Broken jaw     1995      Cholelithiasis 2017    Diverticulosis     Esophageal spasm     Fibromyalgia     Fibromyalgia, primary     GERD (gastroesophageal reflux disease)     Headache 2019    Hypertension     Hypothyroidism     IBS (irritable bowel syndrome)     Nausea     chronic    Overactive bladder     Pneumonia     PONV (postoperative nausea and vomiting)     PVC (premature ventricular contraction)     Rheumatoid arthritis     Umbilical hernia         Past Surgical History:    Procedure Laterality Date    BREAST BIOPSY Right early 90's    BUNIONECTOMY Right 09/30/2022    CARPAL TUNNEL RELEASE  11/2021    Dr. Das    CHOLECYSTECTOMY  2017    CHOLECYSTECTOMY WITH INTRAOPERATIVE CHOLANGIOGRAM N/A 06/30/2017    Procedure: CHOLECYSTECTOMY LAPAROSCOPIC INTRAOPERATIVE CHOLANGIOGRAM;  Surgeon: David Kirkland MD;  Location:  [facility];  Service:     COLONOSCOPY      COLONOSCOPY  2013?    DR BRAR    COLONOSCOPY W/ POLYPECTOMY N/A 6/30/2023    Procedure: COLONOSCOPY WITH POLYPECTOMY;  Surgeon: Oleksandr Lorenzo MD;  Location: [facility] OR;  Service: Gastroenterology;  Laterality: N/A;  ascending colon polyp (cold snare)  transverse colon polyp    ENDOSCOPY N/A 12/01/2017    Procedure: ESOPHAGOGASTRODUODENOSCOPY with esophageal dilitation to 20mm,, and tissue biopsies/ polypectomies esophagus and gastric;  Surgeon: Meliza Pope MD;  Location: [facility] OR;  Service:     ENDOSCOPY N/A 6/30/2023    Procedure: ESOPHAGOGASTRODUODENOSCOPY WITH BIOPSY;  Surgeon: Oleksandr Lorenzo MD;  Location: [facility] OR;  Service: Gastroenterology;  Laterality: N/A;  gastritis-gastric biopsy  esophagitis- distal esophagus biopsy    ESOPHAGOSCOPY  6/30/2023    Procedure: ESOPHAGOSCOPY WITH DILATATION;  Surgeon: Oleksandr Lorenzo MD;  Location: [facility] OR;  Service: Gastroenterology;;  dilate to 18mm    EYE SURGERY Bilateral     LENS REPLACEMENT    HIP SURGERY Left     HYSTERECTOMY      HYSTERECTOMY  2002    INGUINAL HERNIA REPAIR Left     as a child    LASER ABLATION N/A 2002    MANDIBLE FRACTURE SURGERY      SUBTOTAL HYSTERECTOMY  2002    TONSILLECTOMY      TUBAL ABDOMINAL LIGATION  1989    UMBILICAL HERNIA REPAIR N/A 07/31/2020    Procedure: INCISIONAL HERNIA REPAIR WITH MESH;  Surgeon: David Kirkland MD;  Location:  [facility];  Service: General;  Laterality: N/A;    UPPER GASTROINTESTINAL ENDOSCOPY N/A 07/20/2011    Normal upper endosopy - Dr. Meliza Pope        Social  History     Occupational History    Not on file   Tobacco Use    Smoking status: Former     Packs/day: 0.25     Years: 3.00     Additional pack years: 0.00     Total pack years: 0.75     Types: Cigarettes     Quit date: 2004     Years since quittin.3     Passive exposure: Past    Smokeless tobacco: Never   Vaping Use    Vaping Use: Never used   Substance and Sexual Activity    Alcohol use: Yes     Alcohol/week: 7.0 standard drinks of alcohol     Types: 7 Glasses of wine per week     Comment: DAILY GLASS OF WINE    Drug use: Never    Sexual activity: Defer      Social History     Social History Narrative    Not on file        Family History   Problem Relation Age of Onset    Thyroid disease Mother     Hypertension Mother     Arthritis Mother     Hearing loss Mother     Diabetes Father     Hypertension Father     Heart disease Father     Hyperlipidemia Father     Kidney disease Father     Breast cancer Maternal Aunt     Breast cancer Paternal Aunt     Heart disease Brother     Anxiety disorder Daughter     Depression Daughter     Mental illness Daughter     Mental illness Daughter     Thyroid disease Daughter     Malig Hyperthermia Neg Hx        ROS: 14 point review of systems was performed and was negative except for documented findings in HPI and today's encounter.     Allergies:   Allergies   Allergen Reactions    Erythromycin Nausea And Vomiting    Lactose Intolerance (Gi) GI Intolerance    Adhesive Tape Rash     Constitutional:  Denies fever, shaking or chills   Eyes:  Denies change in visual acuity   HENT:  Denies nasal congestion or sore throat   Respiratory:  Denies cough or shortness of breath   Cardiovascular:  Denies chest pain or severe LE edema   GI:  Denies abdominal pain, nausea, vomiting, bloody stools or diarrhea   Musculoskeletal:  Numbness, tingling, or loss of motor function only as noted above in history of present illness.  : Denies painful urination or hematuria  Integument:  Denies  rash, lesion or ulceration   Neurologic:  Denies headache or focal weakness  Endocrine:  Denies lymphadenopathy  Psych:  Denies confusion or change in mental status   Hem:  Denies active bleeding      Physical Exam: 58 y.o. female  Wt Readings from Last 3 Encounters:   11/01/23 87.1 kg (192 lb)   07/23/23 86.6 kg (191 lb)   06/30/23 87 kg (191 lb 12.8 oz)       There is no height or weight on file to calculate BMI.    There were no vitals filed for this visit.  Vital signs reviewed.   General Appearance:    Alert, cooperative, in no acute distress                    Ortho exam    Physical exam of the right and left shoulder reveals no overlying skin changes no lymphedema no lymphadenopathy.  Patient has active flexion 180 with mild symptoms abduction is similar external rotation is to 50 and internal rotation to the upper lumbar spine with mild symptoms.  Patient has good rotator cuff strength 4+ over 5 with isometric strength testing with pain.  Patient has a positive impingement and a positive Love sign.  Patient has good cervical range of motion which is full and asymptomatic no radicular symptoms.  Patient has a normal elbow exam.  Good distal pulses are present  Patient has pain with overhead activity and a positive Neer sign and a positive empty can sign , a positive drop arm and a definitive painful arc   She does have a rounded shoulder forward head posture           Assessment: Bilateral shoulder pain I think this is impingement but she continues to have recurrence of her symptoms I think at some point an MRI is needed I am happy to inject him today they are both agreeable to get back into physical therapy to work on cuff and core strengthening and postural exercises if her symptoms return after that I would get an MRI of the more symptomatic 1    Plan: Is as above  Follow up as indicated.  Ice, elevate, and rest as needed.  Discussed conservative measures of pain control including ice, bracing.  Also  talked about the importance of strengthening  Teri Olmos M.D.    Large Joint Arthrocentesis: R subacromial bursa  Date/Time: 11/6/2023 11:07 AM  Consent given by: patient  Site marked: site marked  Timeout: Immediately prior to procedure a time out was called to verify the correct patient, procedure, equipment, support staff and site/side marked as required   Supporting Documentation  Indications: pain   Procedure Details  Location: shoulder - R subacromial bursa  Preparation: Patient was prepped and draped in the usual sterile fashion  Needle gauge: 21G.  Approach: posterior  Medications administered: 80 mg methylPREDNISolone acetate 80 MG/ML; 2 mL lidocaine PF 1% 1 %  Patient tolerance: patient tolerated the procedure well with no immediate complications      Large Joint Arthrocentesis: L subacromial bursa  Date/Time: 11/6/2023 11:07 AM  Consent given by: patient  Site marked: site marked  Timeout: Immediately prior to procedure a time out was called to verify the correct patient, procedure, equipment, support staff and site/side marked as required   Supporting Documentation  Indications: pain   Procedure Details  Location: shoulder - L subacromial bursa  Preparation: Patient was prepped and draped in the usual sterile fashion  Needle gauge: 21G.  Approach: posterior  Medications administered: 80 mg methylPREDNISolone acetate 80 MG/ML; 2 mL lidocaine PF 1% 1 %  Patient tolerance: patient tolerated the procedure well with no immediate complications

## 2023-11-06 ENCOUNTER — OFFICE VISIT (OUTPATIENT)
Dept: ORTHOPEDIC SURGERY | Facility: CLINIC | Age: 58
End: 2023-11-06
Payer: MEDICARE

## 2023-11-06 VITALS — TEMPERATURE: 97.8 F | HEIGHT: 60 IN | BODY MASS INDEX: 36.32 KG/M2 | WEIGHT: 185 LBS

## 2023-11-06 DIAGNOSIS — M75.40 IMPINGEMENT SYNDROME OF SHOULDER REGION, UNSPECIFIED LATERALITY: ICD-10-CM

## 2023-11-06 DIAGNOSIS — R52 PAIN: Primary | ICD-10-CM

## 2023-11-06 RX ORDER — LIDOCAINE HYDROCHLORIDE 10 MG/ML
2 INJECTION, SOLUTION EPIDURAL; INFILTRATION; INTRACAUDAL; PERINEURAL
Status: COMPLETED | OUTPATIENT
Start: 2023-11-06 | End: 2023-11-06

## 2023-11-06 RX ORDER — METHYLPREDNISOLONE ACETATE 80 MG/ML
80 INJECTION, SUSPENSION INTRA-ARTICULAR; INTRALESIONAL; INTRAMUSCULAR; SOFT TISSUE
Status: COMPLETED | OUTPATIENT
Start: 2023-11-06 | End: 2023-11-06

## 2023-11-06 RX ADMIN — LIDOCAINE HYDROCHLORIDE 2 ML: 10 INJECTION, SOLUTION EPIDURAL; INFILTRATION; INTRACAUDAL; PERINEURAL at 11:07

## 2023-11-06 RX ADMIN — METHYLPREDNISOLONE ACETATE 80 MG: 80 INJECTION, SUSPENSION INTRA-ARTICULAR; INTRALESIONAL; INTRAMUSCULAR; SOFT TISSUE at 11:07

## 2023-11-08 ENCOUNTER — HOSPITAL ENCOUNTER (OUTPATIENT)
Dept: SLEEP MEDICINE | Facility: HOSPITAL | Age: 58
Discharge: HOME | End: 2023-11-08
Admitting: NURSE PRACTITIONER
Payer: MEDICARE

## 2023-11-08 DIAGNOSIS — R06.81 WITNESSED EPISODE OF APNEA: ICD-10-CM

## 2023-11-08 DIAGNOSIS — E66.9 OBESITY (BMI 30-39.9): ICD-10-CM

## 2023-11-08 DIAGNOSIS — R06.83 SNORING: ICD-10-CM

## 2023-11-08 DIAGNOSIS — G47.10 HYPERSOMNIA: ICD-10-CM

## 2023-11-08 PROCEDURE — 95806 SLEEP STUDY UNATT&RESP EFFT: CPT

## 2023-11-15 ENCOUNTER — TELEPHONE (OUTPATIENT)
Dept: SLEEP MEDICINE | Facility: HOSPITAL | Age: 58
End: 2023-11-15
Payer: MEDICARE

## 2023-12-06 ENCOUNTER — TELEPHONE (OUTPATIENT)
Dept: INTERNAL MEDICINE | Facility: CLINIC | Age: 58
End: 2023-12-06
Payer: MEDICARE

## 2023-12-07 ENCOUNTER — OFFICE VISIT (OUTPATIENT)
Dept: SLEEP MEDICINE | Facility: HOSPITAL | Age: 58
End: 2023-12-07
Payer: MEDICARE

## 2023-12-07 VITALS
SYSTOLIC BLOOD PRESSURE: 118 MMHG | DIASTOLIC BLOOD PRESSURE: 78 MMHG | BODY MASS INDEX: 36.91 KG/M2 | OXYGEN SATURATION: 96 % | WEIGHT: 188 LBS | HEIGHT: 60 IN | HEART RATE: 58 BPM

## 2023-12-07 DIAGNOSIS — R06.81 WITNESSED EPISODE OF APNEA: ICD-10-CM

## 2023-12-07 DIAGNOSIS — I49.3 PVC'S (PREMATURE VENTRICULAR CONTRACTIONS): ICD-10-CM

## 2023-12-07 DIAGNOSIS — R29.818 SUSPECTED SLEEP APNEA: Primary | ICD-10-CM

## 2023-12-07 DIAGNOSIS — G47.19 EXCESSIVE DAYTIME SLEEPINESS: ICD-10-CM

## 2023-12-07 DIAGNOSIS — E66.9 CLASS 2 OBESITY WITH BODY MASS INDEX (BMI) OF 36.0 TO 36.9 IN ADULT, UNSPECIFIED OBESITY TYPE, UNSPECIFIED WHETHER SERIOUS COMORBIDITY PRESENT: ICD-10-CM

## 2023-12-07 DIAGNOSIS — R06.83 SNORING: ICD-10-CM

## 2023-12-07 DIAGNOSIS — Z87.09 HISTORY OF PULMONARY FIBROSIS: ICD-10-CM

## 2023-12-07 DIAGNOSIS — F41.8 DEPRESSION WITH ANXIETY: ICD-10-CM

## 2023-12-07 PROCEDURE — G0463 HOSPITAL OUTPT CLINIC VISIT: HCPCS

## 2023-12-07 NOTE — PROGRESS NOTES
[facility]  [address]  [address]  [place] [address]  Phone   Fax       Aundrea Mcgarry  [id number]   1965  58 y.o.  female        PCP Spenser Hodgson MD    Type of service: Initial Sleep Medicine Consult.  Date of service: 12/7/2023      Chief Complaint   Patient presents with    Snoring    Witnessed Apnea     Gasping for air in sleep    Inconclusive home sleep study still suspect sleep apnea       History of present illness;  The patient was seen today on 12/7/2023 at [facility].    Thank you for asking to see Aundrea Mcgarry, 58 y.o. PMHx , Idiopathic Pulmonary Fibrosis, PVCs (on metoprolol for same), depression, anxiety, fibromyalgia, hypothyroidism, GERD,.  The patient presents for initial evaluation of sleep sleep disordered breathing.  Patient  denies prior surgery namely tonsillectomy, nasal surgery or UPPP.       New patient to [facility]  -Loud snoring > 1 year time worse over past 3 years  Gasps for air in her sleep, wakes up with choking sensation 3-4x nights week   Denies any associated symptoms at those times, goes right back to sleep and may happen more than 1 time a night  Denies any chest-pain/dyspnea/CALDERON/orthopnea  20-30 lbs in last 3 years   Had a recent home sleep study states she was very uncomfortable that night due to home sleep study stated device was too bulky interfered with her sleep, states she was awake most of the night atypical nights sleep for her  -11/9/2023 HST ODESSA was 3.1  Interpreting physician no obstructive sleep   Recommendations offer in lab polysomnogram for further evaluation interpreting physician Dr. Contreras Werner       Obstructive Sleep Apnea Screening: STOP-BANG Sleep Apnea Questionnaire. Reference: Bobby F et al. Br J Anaesth, 2012.     Criterion    Yes    No  Do you SNORE loudly?   [x]   Yes  []   No   Do you often feel TIRED, fatigued, or sleepy during the day?     [x]   Yes  []   No  Has anyone OBSERVED you stop breathing during your sleep?    [x]   Yes  []   No  Do you have or are you being treated for high blood PRESSURE?    [x]   Yes  []   No  BMI >32 kg/m2     [x]   Yes  []   No  AGE > 50 years    [x]   Yes  []   No  NECK circumference >16 inches / 40 cm    [x]   Yes  []   No  GENDER: male     []   Yes  [x]   No    ANNA Probability:  []   1-2 - Low  []   3-4 - Intermediate  [x]   5-8 - High      -States she has a history of idiopathic pulmonary fibrosis diagnosed many years ago  Denies ever needing supplemental O2      Further Sleep History:    Bedtime: 11 PM-midnight  Rise Time: 9 AM-10:30 AM  Sleep Latency: Less than 20 minutes   Screens in bed: Yes [email or web address]/amazon  Wake after sleep onset: 1x  Reasons for awakenings: nocturia  Number of naps per day: Sometimes   Naps restorative: feels worse after naps  Caffeine use: 2 beverages per day    RLS Symptoms: No   Bruxism:No   Current sleep related gastroesophageal reflux symptoms:  No   Cataplexy:  No   Sleep Paralysis:  No   Hypnagogic or hypnopompic hallucinations: No   Parasomnias such as sleep walking or sleep eating No     Disclaimer Sleep History: The above sleep history is based on this sleep physician's in room encounter with the patient. Pre encounter self administered questionnaires are taken into consideration and discussed with patient for any discordance. The above documentation by this sleep physician is the most accurate clinical information determined by in room sleep physician encounter with patient.     MEDICAL CONDITIONS (PMH)   Hypertension  PVCs  Depression  Anxiety  Fibromyalgia  Hypothyroidism  GERD    Social history:  Do you drive a commercial vehicle:  No   Shift work:  No   Tobacco use:  No   Alcohol use: 1-3 per week    Family Hx (parents and siblings) (pertaining to sleep medicine)  Pt unable to provide     Medications: reviewed    Review of systems is negative unless otherwise noted per HPI  "  Disclaimer History: The above history is based on this sleep physician's in room encounter with the patient. Pre encounter self administered questionnaires are taken into consideration and discussed with patient for any discordance. The above documentation by this sleep physician is the most accurate clinical information determined by in room sleep physician encounter with patient.     Physical exam:  Vitals:    12/07/23 1500   BP: 118/78   Pulse: 58   SpO2: 96%   Weight: 85.3 kg (188 lb)   Height: 152.4 cm (60\")    Body mass index is 36.72 kg/m².   CONSTITUTIONAL:  Non-toxic, In no overt distress   Head: normocephalic   ENT: Mallampati class IV, + macroglossia, no septal defects   NECK: 16.5 inches,no nuchal rigidity  RESPIRATORY SYSTEM: Breath sounds are diminished bilaterally (no rales, no rhonchi, no wheezes), no accessory muscle use  CARDIOVASULAR SYSTEM: Heart sounds are regular rhythm and normal rate, no rub, no gallop, no edema  NEUROLOGICAL SYSTEM: Oriented x 3, No gross focal deficits   PSYCHIATRIC SYSTEM: Goal oriented, affect full range appropriate      Office notes from care team reviewed:      -Last seen by sleep APRLAUREN Munson 11/1/2023 for initial evaluation of sleep apnea HST was ordered  -11/9/2023 HST ODESSA was 3.1  Interpreting physician no obstructive sleep   Recommendations offer in lab polysomnogram for further evaluation interpreting physician Dr. Contreras Werner    Labs reviewed.  TSH          6/12/2023    09:17   TSH   TSH 8.510       Most Recent A1C          6/12/2023    09:17   HGBA1C Most Recent   Hemoglobin A1C 5.40     6/12/23 Bicarb 26    Imaging/Diagnostics reviewed:     11/16/2018 EF 67% see full report for further details    Assessment and plan:  Suspected sleep apnea [R29.818]/Witnessed episode of apnea [R06.81]     patient's symptoms and examination is consistent with sleep apnea (G47.30). I have talked to the patient about the signs and symptoms of sleep apnea. In addition, I " have also discussed pathophysiology of sleep apnea.  I also discussed the complications of untreated sleep apnea including effects on hypertension, diabetes mellitus and nonrestorative sleep with hypersomnia which can increase risk for motor vehicle accidents.  Untreated sleep apnea is also a risk factor for development of atrial fibrillation, hypertension, insulin resistance and cerebrovascular accident.  Discussed in detail of various testing methods including home-based and lab based sleep studies.  Based on history and physical examination and other comorbidities the most appropriate study is to order SPLIT AHI > 15 in laboratory polysomnography, rather than home sleep apnea testing,to rule out diagnosis of sleep apnea per AASM clinical practice guidelines (doi:10.5664/jcsm.6506) secondary to patient’s history of  non-diagnostic home sleep study, history of idiopathic pulmonary fibrosis..  The order for the sleep study is placed in [facility].  The test will be scheduled after approval from insurance. Treatment and management will be discussed after the test is completed.  Patient was given opportunity to ask questions and all the questions were answered.   Snoring (R06.83), snoring is the sound created by turbulent airflow vibrating upper airway soft tissue due to limitation of inspiratory airflow. I have also discussed factors affecting snoring including sleep deprivation, sleeping on the back and alcohol ingestion. To minimize snoring, patient is advised to have adequate sleep, sleep on the side and avoid alcohol and sedative medications before bedtime  Excessive daytime sleepiness .  Patient endorses subjective excessive daytime sleepiness with sleep physician encounter which was consistent with patient's pre-encounter self-administered [place] Sleepiness Scale of Total score: 11.  There are many causes for daytime excessive sleepiness including sleep depression, shiftwork syndrome, depression and other medical  disorders including heart, kidney and liver failure.  The most serious cause of excessive sleepiness is due to neurological conditions like narcolepsy/cataplexy.  But the most common cause of excessive sleepiness is due to sleep apnea with frequent awakenings during sleep time.  I have discussed safety of driving and to remain vigilant while driving.  Obesity, patient's BMI is Body mass index is 36.72 kg/m².. I have discussed the relationship between weight and sleep apnea.There is direct correlation between weight and severity of sleep apnea.  Weight reduction is encouraged, as it is going to reduce the severity of sleep apnea. I have also discussed with the patient diet and exercise to achieve ideal body weight.  History of pulmonary fibrosis,  in lab sleep study as stated above. Follow up with PCP.  History of PVC(s), on metoprolol for same. Follow up with PCP.  Depression with anxiety [F41.8], Follow up with primary care physician for continued management. Comorbid mood disorders would make the patient eligible for a trial of PAP therapy even if sleep study reveals mild severity sleep apnea.    I have also discussed with the patient the following  Sleep hygiene: Maintaining a regular bedtime and wake time, not to watch television or work in bed, limit caffeine-containing beverages before bed time and avoid naps during the day  Adequate amount of sleep.  Generally most people needs about 7 to 8 hours of sleep.      Return for 31 to 90 days after PAP setup with down load.  Patient's questions were answered      I once again thank you for asking me to see this patient in consultation and I have forwarded my opinion and treatment plan.  Please do not hesitate to call me if you have any questions.       EMR Dragon/Transcription disclaimer:   Much of this encounter note is an electronic transcription/translation of spoken language to printed text. The electronic translation of spoken language may permit erroneous, or  at times, nonsensical words or phrases to be inadvertently transcribed; Although I have reviewed the note for such errors, some may still exist.     NPI #: [id number]    Augustin Wilson, DO  Sleep Medicine  [facility]  12/07/23

## 2023-12-11 RX ORDER — FAMOTIDINE 40 MG/1
TABLET, FILM COATED ORAL
Qty: 90 TABLET | Refills: 3 | Status: SHIPPED | OUTPATIENT
Start: 2023-12-11

## 2023-12-12 RX ORDER — TRAZODONE HYDROCHLORIDE 100 MG/1
100 TABLET ORAL NIGHTLY
Qty: 90 TABLET | Refills: 1 | Status: SHIPPED | OUTPATIENT
Start: 2023-12-12

## 2023-12-12 NOTE — TELEPHONE ENCOUNTER
Rx Refill Note  Requested Prescriptions     Pending Prescriptions Disp Refills    traZODone (DESYREL) 100 MG tablet 90 tablet 1     Sig: Take 1 tablet by mouth Every Night.      Last office visit with prescribing clinician: 6/19/2023   Last telemedicine visit with prescribing clinician: Visit date not found   Next office visit with prescribing clinician: 12/20/2023                         Would you like a call back once the refill request has been completed: [] Yes [] No    If the office needs to give you a call back, can they leave a voicemail: [] Yes [] No    Jomar Ross, ARLEEN  12/12/23, 12:24 EST

## 2023-12-13 DIAGNOSIS — E03.9 HYPOTHYROIDISM, UNSPECIFIED TYPE: ICD-10-CM

## 2023-12-13 DIAGNOSIS — E03.9 HYPOTHYROIDISM, UNSPECIFIED TYPE: Primary | ICD-10-CM

## 2023-12-14 LAB
ALBUMIN SERPL-MCNC: 4.4 G/DL (ref 3.5–5.2)
ALBUMIN/GLOB SERPL: 2.8 G/DL
ALP SERPL-CCNC: 95 U/L (ref 39–117)
ALT SERPL-CCNC: 26 U/L (ref 1–33)
AST SERPL-CCNC: 20 U/L (ref 1–32)
BASOPHILS # BLD AUTO: 0.06 10*3/MM3 (ref 0–0.2)
BASOPHILS NFR BLD AUTO: 1 % (ref 0–1.5)
BILIRUB SERPL-MCNC: 0.3 MG/DL (ref 0–1.2)
BUN SERPL-MCNC: 12 MG/DL (ref 6–20)
BUN/CREAT SERPL: 16.4 (ref 7–25)
CALCIUM SERPL-MCNC: 9.4 MG/DL (ref 8.6–10.5)
CHLORIDE SERPL-SCNC: 103 MMOL/L (ref 98–107)
CHOLEST SERPL-MCNC: 196 MG/DL (ref 0–200)
CHOLEST/HDLC SERPL: 3.32 {RATIO}
CO2 SERPL-SCNC: 25.8 MMOL/L (ref 22–29)
CREAT SERPL-MCNC: 0.73 MG/DL (ref 0.57–1)
EGFRCR SERPLBLD CKD-EPI 2021: 94.9 ML/MIN/1.73
EOSINOPHIL # BLD AUTO: 0.18 10*3/MM3 (ref 0–0.4)
EOSINOPHIL NFR BLD AUTO: 3.1 % (ref 0.3–6.2)
ERYTHROCYTE [DISTWIDTH] IN BLOOD BY AUTOMATED COUNT: 12.4 % (ref 12.3–15.4)
GLOBULIN SER CALC-MCNC: 1.6 GM/DL
GLUCOSE SERPL-MCNC: 93 MG/DL (ref 65–99)
HCT VFR BLD AUTO: 41.8 % (ref 34–46.6)
HDLC SERPL-MCNC: 59 MG/DL (ref 40–60)
HGB BLD-MCNC: 13.7 G/DL (ref 12–15.9)
IMM GRANULOCYTES # BLD AUTO: 0.02 10*3/MM3 (ref 0–0.05)
IMM GRANULOCYTES NFR BLD AUTO: 0.3 % (ref 0–0.5)
LDLC SERPL CALC-MCNC: 122 MG/DL (ref 0–100)
LYMPHOCYTES # BLD AUTO: 1.48 10*3/MM3 (ref 0.7–3.1)
LYMPHOCYTES NFR BLD AUTO: 25.5 % (ref 19.6–45.3)
MCH RBC QN AUTO: 31.1 PG (ref 26.6–33)
MCHC RBC AUTO-ENTMCNC: 32.8 G/DL (ref 31.5–35.7)
MCV RBC AUTO: 94.8 FL (ref 79–97)
MONOCYTES # BLD AUTO: 0.53 10*3/MM3 (ref 0.1–0.9)
MONOCYTES NFR BLD AUTO: 9.1 % (ref 5–12)
NEUTROPHILS # BLD AUTO: 3.53 10*3/MM3 (ref 1.7–7)
NEUTROPHILS NFR BLD AUTO: 61 % (ref 42.7–76)
NRBC BLD AUTO-RTO: 0 /100 WBC (ref 0–0.2)
PLATELET # BLD AUTO: 278 10*3/MM3 (ref 140–450)
POTASSIUM SERPL-SCNC: 4.3 MMOL/L (ref 3.5–5.2)
PROT SERPL-MCNC: 6 G/DL (ref 6–8.5)
RBC # BLD AUTO: 4.41 10*6/MM3 (ref 3.77–5.28)
SODIUM SERPL-SCNC: 138 MMOL/L (ref 136–145)
T4 FREE SERPL-MCNC: 1.53 NG/DL (ref 0.93–1.7)
TRIGL SERPL-MCNC: 80 MG/DL (ref 0–150)
TSH SERPL DL<=0.005 MIU/L-ACNC: 0.1 UIU/ML (ref 0.27–4.2)
VLDLC SERPL CALC-MCNC: 15 MG/DL (ref 5–40)
WBC # BLD AUTO: 5.8 10*3/MM3 (ref 3.4–10.8)

## 2023-12-15 DIAGNOSIS — E03.9 HYPOTHYROIDISM, UNSPECIFIED TYPE: ICD-10-CM

## 2023-12-15 RX ORDER — LEVOTHYROXINE SODIUM 0.12 MG/1
125 TABLET ORAL DAILY
Qty: 30 TABLET | Refills: 0 | Status: SHIPPED | OUTPATIENT
Start: 2023-12-15

## 2023-12-15 NOTE — TELEPHONE ENCOUNTER
Normal TSH in past 12 months   Rx Refill Note  Requested Prescriptions     Pending Prescriptions Disp Refills    levothyroxine (SYNTHROID, LEVOTHROID) 125 MCG tablet 90 tablet 1     Sig: Take 1 tablet by mouth Daily.      Last office visit with prescribing clinician: 6/19/2023   Last telemedicine visit with prescribing clinician: Visit date not found   Next office visit with prescribing clinician: 12/20/2023                         Would you like a call back once the refill request has been completed: [] Yes [] No    If the office needs to give you a call back, can they leave a voicemail: [] Yes [] No    Sparkle Trammell CMA  12/15/23, 08:39 EST

## 2023-12-20 ENCOUNTER — OFFICE VISIT (OUTPATIENT)
Dept: INTERNAL MEDICINE | Facility: CLINIC | Age: 58
End: 2023-12-20
Payer: MEDICARE

## 2023-12-20 VITALS
HEIGHT: 60 IN | DIASTOLIC BLOOD PRESSURE: 74 MMHG | SYSTOLIC BLOOD PRESSURE: 114 MMHG | HEART RATE: 57 BPM | OXYGEN SATURATION: 97 % | BODY MASS INDEX: 37.3 KG/M2 | WEIGHT: 190 LBS | TEMPERATURE: 97.8 F

## 2023-12-20 DIAGNOSIS — M79.10 MYALGIA: ICD-10-CM

## 2023-12-20 DIAGNOSIS — Z00.00 ROUTINE HEALTH MAINTENANCE: ICD-10-CM

## 2023-12-20 DIAGNOSIS — E55.9 HYPOVITAMINOSIS D: ICD-10-CM

## 2023-12-20 DIAGNOSIS — K21.00 GASTROESOPHAGEAL REFLUX DISEASE WITH ESOPHAGITIS WITHOUT HEMORRHAGE: ICD-10-CM

## 2023-12-20 DIAGNOSIS — Z12.31 ENCOUNTER FOR SCREENING MAMMOGRAM FOR MALIGNANT NEOPLASM OF BREAST: ICD-10-CM

## 2023-12-20 DIAGNOSIS — I49.3 VENTRICULAR PREMATURE BEATS: ICD-10-CM

## 2023-12-20 DIAGNOSIS — Z86.010 HISTORY OF COLON POLYPS: ICD-10-CM

## 2023-12-20 DIAGNOSIS — E03.9 HYPOTHYROIDISM, UNSPECIFIED TYPE: ICD-10-CM

## 2023-12-20 DIAGNOSIS — B00.1 RECURRENT HERPES LABIALIS: ICD-10-CM

## 2023-12-20 DIAGNOSIS — N32.81 OAB (OVERACTIVE BLADDER): ICD-10-CM

## 2023-12-20 DIAGNOSIS — F41.8 DEPRESSION WITH ANXIETY: Primary | ICD-10-CM

## 2023-12-20 RX ORDER — PERPHENAZINE 4 MG
TABLET ORAL
COMMUNITY
Start: 2023-11-01

## 2023-12-20 NOTE — PROGRESS NOTES
Subjective   Aundrea Mcgarry is a 58 y.o. female presenting today for follow up of   Chief Complaint   Patient presents with    Hypothyroidism    Depression    Anxiety       Anxiety      Her past medical history is significant for depression.   Hypothyroidism    Depression     1. Depression with anxiety.  Pt reports she is feeling well.  Tolerating bupropion and escitalopram.  Denies SI.    2. Myalgia.  Pt reports the gabapentin is still effective in controlling her pain.  She is tolerating it well and functioning better in her daily activities.    3. Hypothyroidism.  Pt is compliant with daily replacement.    Patient Active Problem List   Diagnosis    Arthritis    Bunion    Gastroesophageal reflux disease with esophagitis    Dyskinesia of esophagus    Ventricular premature beats    Recurrent herpes labialis    Hypothyroidism    Chronic left shoulder pain    Bursitis/tendonitis, shoulder    Routine health maintenance    Depression with anxiety    Dysphagia    Osteopenia of both thighs    Hiatal hernia    OAB (overactive bladder)    Pulmonary nodule, right    Umbilical hernia with obstruction, without gangrene    Carpal tunnel syndrome on both sides    Myalgia    History of colon polyps       Current Outpatient Medications on File Prior to Visit   Medication Sig    acyclovir (ZOVIRAX) 400 MG tablet Take 1 tablet by mouth 5 (Five) Times a Day.    buPROPion XL (WELLBUTRIN XL) 300 MG 24 hr tablet Take 1 tablet by mouth Every Morning.    CBD (cannabidiol) oral oil Take  by mouth Daily.    Collagen-Vitamin C-Biotin (Collagen 1500/C) 500-50-0.8 MG capsule     cyclobenzaprine (FLEXERIL) 5 MG tablet Take 1-2 tablets by mouth 3 (Three) Times a Day As Needed for Muscle Spasms.    escitalopram (LEXAPRO) 20 MG tablet TAKE ONE TABLET BY MOUTH DAILY    famotidine (PEPCID) 40 MG tablet TAKE 1 TABLET BY MOUTH TWICE A DAY WITH LUNCH AND WITH DINNER    gabapentin (NEURONTIN) 300 MG capsule Take 1 capsule by mouth 2 (Two) Times a Day.     ibuprofen (ADVIL,MOTRIN) 200 MG tablet Take 3 tablets by mouth Every 6 (Six) Hours As Needed for Mild Pain.    lansoprazole (PREVACID) 30 MG capsule Take 1 capsule by mouth 2 (Two) Times a Day.    levothyroxine (SYNTHROID, LEVOTHROID) 125 MCG tablet Take 1 tablet by mouth Daily.    metoprolol tartrate (LOPRESSOR) 25 MG tablet Take 1 tablet by mouth 2 (Two) Times a Day.    MULTIPLE VITAMIN PO Take 1 tablet by mouth Daily.    oxybutynin XL (DITROPAN-XL) 5 MG 24 hr tablet Take 1 tablet by mouth Daily.    traZODone (DESYREL) 100 MG tablet Take 1 tablet by mouth Every Night.    vitamin B-12 (CYANOCOBALAMIN) 100 MCG tablet Take 0.5 tablets by mouth Daily.    aluminum hydroxide-mag carbonate (GAVISCON EXTRA RELIEF) 160-105 MG chewable tablet chewable tablet Chew 1-2 tablets As Needed.    [DISCONTINUED] benzonatate (TESSALON) 100 MG capsule Take 1-2 capsules by mouth 3 (Three) Times a Day As Needed for Cough.    [DISCONTINUED] guaifenesin-dextromethorphan (MUCINEX DM)  MG tablet sustained-release 12 hour tablet Take 1 tablet by mouth 2 (Two) Times a Day As Needed (cough).    [DISCONTINUED] ipratropium (ATROVENT) 0.06 % nasal spray 2 sprays into the nostril(s) as directed by provider 4 (Four) Times a Day.    [DISCONTINUED] Nirmatrelvir&Ritonavir 300/100 (PAXLOVID) 20 x 150 MG & 10 x 100MG tablet therapy pack tablet Take 3 tablets by mouth 2 (Two) Times a Day.    [DISCONTINUED] ofloxacin (OCUFLOX) 0.3 % ophthalmic solution     [DISCONTINUED] predniSONE (DELTASONE) 50 MG tablet Take 1 tablet by mouth Daily With Breakfast.     No current facility-administered medications on file prior to visit.          The following portions of the patient's history were reviewed and updated as appropriate: allergies, current medications, past family history, past medical history, past social history, past surgical history and problem list.    Review of Systems   Constitutional: Negative.    Respiratory: Negative.     Cardiovascular:  "Negative.    Psychiatric/Behavioral: Negative.         Objective   Vitals:    12/20/23 0906   Pulse: 57   Temp: 97.8 °F (36.6 °C)   TempSrc: Infrared   SpO2: 97%   Weight: 86.2 kg (190 lb)   Height: 152.4 cm (60\")       BP Readings from Last 3 Encounters:   12/07/23 118/78   11/01/23 100/67   07/23/23 138/90        Wt Readings from Last 3 Encounters:   12/20/23 86.2 kg (190 lb)   12/07/23 85.3 kg (188 lb)   11/06/23 83.9 kg (185 lb)        Body mass index is 37.11 kg/m².  Nursing notes and vitals reviewed.    Physical Exam  Vitals and nursing note reviewed.   Constitutional:       Appearance: Normal appearance. She is well-developed.   HENT:      Head: Normocephalic and atraumatic.      Mouth/Throat:      Mouth: Mucous membranes are moist.   Eyes:      Extraocular Movements: Extraocular movements intact.      Conjunctiva/sclera: Conjunctivae normal.   Neck:      Thyroid: No thyromegaly.   Cardiovascular:      Rate and Rhythm: Normal rate and regular rhythm.      Heart sounds: Normal heart sounds.   Pulmonary:      Effort: Pulmonary effort is normal.      Breath sounds: Normal breath sounds.   Abdominal:      Palpations: Abdomen is soft.      Tenderness: There is no abdominal tenderness.   Musculoskeletal:      Cervical back: Neck supple. No rigidity.      Right lower leg: No edema.      Left lower leg: No edema.   Skin:     General: Skin is warm and dry.   Neurological:      General: No focal deficit present.      Mental Status: She is alert and oriented to person, place, and time.   Psychiatric:         Mood and Affect: Mood normal.         Behavior: Behavior normal.         Recent Results (from the past 672 hour(s))   T4, Free    Collection Time: 12/13/23 10:45 AM    Specimen: Blood   Result Value Ref Range    Free T4 1.53 0.93 - 1.70 ng/dL   TSH    Collection Time: 12/13/23 10:45 AM    Specimen: Blood   Result Value Ref Range    TSH 0.102 (L) 0.270 - 4.200 uIU/mL   Lipid Panel With / Chol / HDL Ratio    " Collection Time: 12/13/23 10:45 AM    Specimen: Blood   Result Value Ref Range    Total Cholesterol 196 0 - 200 mg/dL    Triglycerides 80 0 - 150 mg/dL    HDL Cholesterol 59 40 - 60 mg/dL    VLDL Cholesterol Dusty 15 5 - 40 mg/dL    LDL Chol Calc (NIH) 122 (H) 0 - 100 mg/dL    Chol/HDL Ratio 3.32    Comprehensive Metabolic Panel    Collection Time: 12/13/23 10:45 AM    Specimen: Blood   Result Value Ref Range    Glucose 93 65 - 99 mg/dL    BUN 12 6 - 20 mg/dL    Creatinine 0.73 0.57 - 1.00 mg/dL    EGFR Result 94.9 >60.0 mL/min/1.73    BUN/Creatinine Ratio 16.4 7.0 - 25.0    Sodium 138 136 - 145 mmol/L    Potassium 4.3 3.5 - 5.2 mmol/L    Chloride 103 98 - 107 mmol/L    Total CO2 25.8 22.0 - 29.0 mmol/L    Calcium 9.4 8.6 - 10.5 mg/dL    Total Protein 6.0 6.0 - 8.5 g/dL    Albumin 4.4 3.5 - 5.2 g/dL    Globulin 1.6 gm/dL    A/G Ratio 2.8 g/dL    Total Bilirubin 0.3 0.0 - 1.2 mg/dL    Alkaline Phosphatase 95 39 - 117 U/L    AST (SGOT) 20 1 - 32 U/L    ALT (SGPT) 26 1 - 33 U/L   CBC & Differential    Collection Time: 12/13/23 10:45 AM   Result Value Ref Range    WBC 5.80 3.40 - 10.80 10*3/mm3    RBC 4.41 3.77 - 5.28 10*6/mm3    Hemoglobin 13.7 12.0 - 15.9 g/dL    Hematocrit 41.8 34.0 - 46.6 %    MCV 94.8 79.0 - 97.0 fL    MCH 31.1 26.6 - 33.0 pg    MCHC 32.8 31.5 - 35.7 g/dL    RDW 12.4 12.3 - 15.4 %    Platelets 278 140 - 450 10*3/mm3    Neutrophil Rel % 61.0 42.7 - 76.0 %    Lymphocyte Rel % 25.5 19.6 - 45.3 %    Monocyte Rel % 9.1 5.0 - 12.0 %    Eosinophil Rel % 3.1 0.3 - 6.2 %    Basophil Rel % 1.0 0.0 - 1.5 %    Neutrophils Absolute 3.53 1.70 - 7.00 10*3/mm3    Lymphocytes Absolute 1.48 0.70 - 3.10 10*3/mm3    Monocytes Absolute 0.53 0.10 - 0.90 10*3/mm3    Eosinophils Absolute 0.18 0.00 - 0.40 10*3/mm3    Basophils Absolute 0.06 0.00 - 0.20 10*3/mm3    Immature Granulocyte Rel % 0.3 0.0 - 0.5 %    Immature Grans Absolute 0.02 0.00 - 0.05 10*3/mm3    nRBC 0.0 0.0 - 0.2 /100 WBC         Assessment & Plan    Diagnoses and all orders for this visit:    1. Depression with anxiety (Primary)    2. OAB (overactive bladder)    3. Recurrent herpes labialis    4. Myalgia    5. Hypothyroidism, unspecified type  -     Lipid Panel With / Chol / HDL Ratio; Future  -     TSH; Future  -     CBC Auto Differential; Future  -     T4, Free; Future    6. Gastroesophageal reflux disease with esophagitis without hemorrhage    7. Ventricular premature beats  -     Comprehensive Metabolic Panel; Future    8. Routine health maintenance  -     Mammo screening digital tomosynthesis bilateral w CAD; Future  -     Hemoglobin A1c; Future  -     Vitamin B12; Future    9. Encounter for screening mammogram for malignant neoplasm of breast  -     Mammo screening digital tomosynthesis bilateral w CAD; Future    10. History of colon polyps    11. Hypovitaminosis D  -     Vitamin D,25-Hydroxy; Future    1. Depression with anxiety.  Controlled with bupropion and escitalopram.     2. OAB.  Controlled with dietary measures and oxybutynin.     3. Recurrent herpes labialis.  No flare.  Continue prn acyclovir.     4. Myalgias.  Doing okay with gabapentin.     5. Hypothyroidism.  Under-replaced last time and we increased levothyroxine from 112 mcg to 125 mcg daily.  TSH borderline suppressed this time.  Continue same and monitor.     6. GERD, esophageal dyskinesia.  On BID lansoprazole and BID famotidine, trazodone.  She sees GI and EGD is planned for later this month.     7. PVCs.  Symptoms improved with metoprolol.  Continue same.    8. History of colon polyps.  6/2023, Dr. Lorenzo.  Recall 7 years.     9. RHM.  Mammogram due next month.  Pap smear not needed due to hysterectomy.  Tdap UTD. Flu and Covid-19 vaccines at pharmacy.  Pt had dose pneumovax.  Shingrix at pharmacy.      Medications, including side effects, were discussed with the patient. Patient verbalized understanding.  The plan of care was discussed. All questions were answered. Patient  verbalized understanding.      Return in about 6 months (around 6/20/2024) for Annual physical.

## 2023-12-28 ENCOUNTER — HOSPITAL ENCOUNTER (OUTPATIENT)
Dept: SLEEP MEDICINE | Facility: HOSPITAL | Age: 58
End: 2023-12-28
Payer: MEDICARE

## 2023-12-28 DIAGNOSIS — R06.81 WITNESSED EPISODE OF APNEA: ICD-10-CM

## 2023-12-28 DIAGNOSIS — G47.19 EXCESSIVE DAYTIME SLEEPINESS: ICD-10-CM

## 2023-12-28 DIAGNOSIS — Z87.09 HISTORY OF PULMONARY FIBROSIS: ICD-10-CM

## 2023-12-28 DIAGNOSIS — R06.83 SNORING: ICD-10-CM

## 2023-12-28 DIAGNOSIS — R29.818 SUSPECTED SLEEP APNEA: ICD-10-CM

## 2023-12-28 PROCEDURE — 95810 POLYSOM 6/> YRS 4/> PARAM: CPT

## 2024-01-03 DIAGNOSIS — G47.33 OBSTRUCTIVE SLEEP APNEA, ADULT: Primary | ICD-10-CM

## 2024-01-03 DIAGNOSIS — G47.19 EXCESSIVE DAYTIME SLEEPINESS: ICD-10-CM

## 2024-01-03 NOTE — TELEPHONE ENCOUNTER
Rx Refill Note  Requested Prescriptions     Pending Prescriptions Disp Refills    metoprolol tartrate (LOPRESSOR) 25 MG tablet 180 tablet 1     Sig: Take 1 tablet by mouth 2 (Two) Times a Day.      Last office visit with prescribing clinician: 12/20/2023   Last telemedicine visit with prescribing clinician: Visit date not found   Next office visit with prescribing clinician: 6/24/2024                         Would you like a call back once the refill request has been completed: [] Yes [] No    If the office needs to give you a call back, can they leave a voicemail: [] Yes [] No    Sparkle Trammell CMA  01/03/24, 14:20 EST

## 2024-01-05 ENCOUNTER — TELEPHONE (OUTPATIENT)
Dept: SLEEP MEDICINE | Facility: HOSPITAL | Age: 59
End: 2024-01-05
Payer: MEDICARE

## 2024-01-05 NOTE — TELEPHONE ENCOUNTER
Called patient with sleep study result, sending order for CPAP to [facility]. Compliance scheduled.

## 2024-01-12 DIAGNOSIS — F41.8 DEPRESSION WITH ANXIETY: ICD-10-CM

## 2024-01-15 RX ORDER — GABAPENTIN 300 MG/1
300 CAPSULE ORAL 2 TIMES DAILY
Qty: 60 CAPSULE | Refills: 5 | Status: SHIPPED | OUTPATIENT
Start: 2024-01-15

## 2024-01-16 DIAGNOSIS — E03.9 HYPOTHYROIDISM, UNSPECIFIED TYPE: ICD-10-CM

## 2024-01-16 NOTE — TELEPHONE ENCOUNTER
Normal TSH in past 12 months     Rx Refill Note  Requested Prescriptions     Pending Prescriptions Disp Refills    levothyroxine (SYNTHROID, LEVOTHROID) 125 MCG tablet 30 tablet 0     Sig: Take 1 tablet by mouth Daily.      Last office visit with prescribing clinician: 12/20/2023   Last telemedicine visit with prescribing clinician: Visit date not found   Next office visit with prescribing clinician: 6/24/2024                         Would you like a call back once the refill request has been completed: [] Yes [] No    If the office needs to give you a call back, can they leave a voicemail: [] Yes [] No    Sparkle Trammell CMA  01/16/24, 11:06 EST

## 2024-01-19 RX ORDER — LEVOTHYROXINE SODIUM 0.12 MG/1
125 TABLET ORAL DAILY
Qty: 90 TABLET | Refills: 1 | Status: SHIPPED | OUTPATIENT
Start: 2024-01-19

## 2024-02-15 DIAGNOSIS — F41.8 DEPRESSION WITH ANXIETY: ICD-10-CM

## 2024-02-15 DIAGNOSIS — N32.81 OAB (OVERACTIVE BLADDER): ICD-10-CM

## 2024-02-15 RX ORDER — BUPROPION HYDROCHLORIDE 300 MG/1
300 TABLET ORAL EVERY MORNING
Qty: 90 TABLET | Refills: 1 | Status: SHIPPED | OUTPATIENT
Start: 2024-02-15

## 2024-02-15 RX ORDER — OXYBUTYNIN CHLORIDE 5 MG/1
5 TABLET, EXTENDED RELEASE ORAL DAILY
Qty: 90 TABLET | Refills: 1 | Status: SHIPPED | OUTPATIENT
Start: 2024-02-15

## 2024-02-23 ENCOUNTER — HOSPITAL ENCOUNTER (OUTPATIENT)
Dept: MAMMOGRAPHY | Facility: HOSPITAL | Age: 59
Discharge: HOME | End: 2024-02-23
Admitting: FAMILY MEDICINE
Payer: MEDICARE

## 2024-02-23 DIAGNOSIS — Z12.31 ENCOUNTER FOR SCREENING MAMMOGRAM FOR MALIGNANT NEOPLASM OF BREAST: ICD-10-CM

## 2024-02-23 DIAGNOSIS — Z00.00 ROUTINE HEALTH MAINTENANCE: ICD-10-CM

## 2024-02-23 PROCEDURE — 77067 SCR MAMMO BI INCL CAD: CPT

## 2024-02-23 PROCEDURE — 77063 BREAST TOMOSYNTHESIS BI: CPT

## 2024-03-14 ENCOUNTER — OFFICE VISIT (OUTPATIENT)
Dept: SLEEP MEDICINE | Facility: HOSPITAL | Age: 59
End: 2024-03-14
Payer: MEDICARE

## 2024-03-14 VITALS
SYSTOLIC BLOOD PRESSURE: 111 MMHG | HEIGHT: 60 IN | WEIGHT: 188 LBS | DIASTOLIC BLOOD PRESSURE: 72 MMHG | OXYGEN SATURATION: 93 % | HEART RATE: 64 BPM | BODY MASS INDEX: 36.91 KG/M2

## 2024-03-14 DIAGNOSIS — F32.A DEPRESSION, UNSPECIFIED DEPRESSION TYPE: ICD-10-CM

## 2024-03-14 DIAGNOSIS — G47.33 OBSTRUCTIVE SLEEP APNEA, ADULT: Primary | ICD-10-CM

## 2024-03-14 DIAGNOSIS — E66.01 CLASS 2 SEVERE OBESITY WITH SERIOUS COMORBIDITY AND BODY MASS INDEX (BMI) OF 35.0 TO 35.9 IN ADULT, UNSPECIFIED OBESITY TYPE: ICD-10-CM

## 2024-03-14 PROCEDURE — G0463 HOSPITAL OUTPT CLINIC VISIT: HCPCS

## 2024-03-14 NOTE — PROGRESS NOTES
[place] [id number] [facility]  [address] [place], [place] [address], [place] [address]  Phone   Fax     SLEEP CLINIC FOLLOW UP PROGRESS NOTE.    Aundrea Mcgarry  [id number]   1965  58 y.o.  female      PCP: Spenser Hodgson MD      Date of visit: 3/14/2024    Chief Complaint   Patient presents with    Sleep Apnea       Medications and allergies are reviewed by me and documented in the encounter.     SOCIAL (habits pertaining to sleep medicine)  History tobacco use:No   History of alcohol use: 3 per week  Caffeine use: 3 beverages/d     HPI:  This is a 58 y.o. PMHx Depression (on lexapro).  Here for management of Obstructive Sleep Apnea (ODESSA 6.4/hr on 12/28/2023 PSG - flow limitation noted)Patient is using positive airway pressure therapy and the symptoms of sleep apnea have improved significantly on the therapy. Normally patient goes to bed at midnight and wakes up at 10-11 AM .  The patient wakes up 1-2 time(s) during the night and has no problem going back to sleep.  Feels refreshed after waking up.     Overall patient's Impression of their PAP therapy is: not well  +mask leak   Descriptor of FFM is dreamwear  No air pressure complaints   Sleep restorative     compliance data is reviewed by me with patient room today  Date range 1/10/2024 - 3/11/2024  Overall use 98%  4-hour lawanda 87%  Average days used 6 hours 29 minutes  Device AirSense 11 AutoSet  8-20 cm H2O, EPR 2  95th percentile pressure is 11.3 cm H2O  Maximum pressure is 12.3 cm H2O  95th percentile leak is 33.8 LPM  Residual AHI is 1.4  DME: [facility]  Mask used: FFM  - dreamwear varient audible leak     -obesity watching her portions remodeling her house being very active   Wt Readings from Last 3 Encounters:   03/14/24 85.3 kg (188 lb)   12/20/23 86.2 kg (190 lb)   12/07/23 85.3 kg (188 lb)       -Depression doing well on lexapro      REVIEW OF SYSTEMS:   Is negative unless otherwise noted in HPI  [place] Sleepiness Scale  ":Total score: 3     Disclaimer History: The above history is based on this sleep physician's in room encounter with the patient. Pre encounter self administered questionnaires are taken into consideration and discussed with patient for any discordance. The above documentation by this sleep physician is the most accurate clinical information determined by in room sleep physician encounter with patient.     PHYSICAL EXAMINATION:  Vitals:    03/14/24 1500   BP: 111/72   Pulse: 64   SpO2: 93%   Weight: 85.3 kg (188 lb)   Height: 152.4 cm (60\")    Body mass index is 36.72 kg/m².   CONSTITUTIONAL: Well appearing, in no overt pain or respiratory distress   NOSE: No septal defect  RESP SYSTEM:  No overt respiratory distress, speaks in clear sentences without dyspnea, no accessory muscle use  CARDIOVASULAR: No edema noted  NEURO: Oriented x 3, no gross focal deficits      compliance data is reviewed by me with patient room today  Date range 1/10/2024 - 3/11/2024  Overall use 98%  4-hour lawanda 87%  Average days used 6 hours 29 minutes  Device AirSense 11 AutoSet  8-20 cm H2O, EPR 2  95th percentile pressure is 11.3 cm H2O  Maximum pressure is 12.3 cm H2O  95th percentile leak is 33.8 LPM  Residual AHI is 1.4  DME: [facility]  Mask used: FFM  - dreamwear varient audible leak     ASSESSMENT AND PLAN:  Obstructive sleep apnea ( G 47.33).    -Specific Changes made by me today:  I. After review of compliance data, in visit clinical correlation, and through shared decision making: will not make any changes to PAP therapy settings**.  II.  ORDERED mask fitting with airfit f20 full face mask  III. Counseled patient to follow-up with me in 3 months for therapy review  The symptoms of sleep apnea have improved with the device and the treatment.  Patient's compliance with the device is excellent for treatment of sleep apnea.  I have independently reviewed the smart card down load and discussed with the patient the download data and " encouarged the patient to continue to use the device.The residual AHI is acceptable. The device is benefiting the patient and the device is medically necessary.  Without proper control of sleep apnea and good compliance there is a increased risk for hypertension, diabetes mellitus and nonrestorative sleep with hypersomnia which can increase risk for motor vehicle accidents.  Untreated sleep apnea is also a risk factor for development of atrial fibrillation, pulmonary hypertension, insulin resistance and stroke. The patient is also instructed to get the supplies from the [employer] company and and change them on a regular basis.  A prescription for supplies has been sent to the [facility].  I have also discussed the good sleep hygiene habits and adequate amount of sleep needed for good health.  Obesity  with BMI is Body mass index is 36.72 kg/m².. Counseled weight loss will be beneficial for reduction in severity of sleep apnea, healthy diet/exercise to achieve same, follow up with primary care physician for serial monitoring and to further guide management.  Depression, Compliant wit home therapies reviewed.  Counseled patient PAP therapy compliance for treatment of obstructive sleep apnea may be beneficial for this comorbid condition.  Follow-up with PCP as previous for Depression       Follow up in 3 months . Patient's questions were answered.        EMR Dragon/Transcription disclaimer:   Much of this encounter note is an electronic transcription/translation of spoken language to printed text. The electronic translation of spoken language may permit erroneous, or at times, nonsensical words or phrases to be inadvertently transcribed; Although I have reviewed the note for such errors, some may still exist.       NPI #: [id number]    Augustin Wilson, DO  Sleep Medicine  [facility]  03/14/24;

## 2024-03-21 RX ORDER — ACYCLOVIR 400 MG/1
400 TABLET ORAL
Qty: 30 TABLET | Refills: 4 | Status: SHIPPED | OUTPATIENT
Start: 2024-03-21

## 2024-04-02 DIAGNOSIS — F41.8 DEPRESSION WITH ANXIETY: ICD-10-CM

## 2024-04-02 NOTE — TELEPHONE ENCOUNTER
Rx Refill Note  Requested Prescriptions     Pending Prescriptions Disp Refills    escitalopram (LEXAPRO) 20 MG tablet 90 tablet 1     Sig: Take 1 tablet by mouth Daily.      Last office visit with prescribing clinician: 12/20/2023   Last telemedicine visit with prescribing clinician: Visit date not found   Next office visit with prescribing clinician: 6/24/2024                         Would you like a call back once the refill request has been completed: [] Yes [] No    If the office needs to give you a call back, can they leave a voicemail: [] Yes [] No    Radha Lozada MA  04/02/24, 09:51 EDT

## 2024-04-03 RX ORDER — ESCITALOPRAM OXALATE 20 MG/1
20 TABLET ORAL DAILY
Qty: 90 TABLET | Refills: 1 | Status: SHIPPED | OUTPATIENT
Start: 2024-04-03

## 2024-06-06 RX ORDER — FAMOTIDINE 40 MG/1
TABLET, FILM COATED ORAL
Qty: 180 TABLET | OUTPATIENT
Start: 2024-06-06

## 2024-06-06 RX ORDER — TRAZODONE HYDROCHLORIDE 100 MG/1
100 TABLET ORAL NIGHTLY
Qty: 90 TABLET | Refills: 1 | Status: SHIPPED | OUTPATIENT
Start: 2024-06-06

## 2024-06-06 NOTE — TELEPHONE ENCOUNTER
Rx Refill Note  Requested Prescriptions     Pending Prescriptions Disp Refills    traZODone (DESYREL) 100 MG tablet 90 tablet 1     Sig: Take 1 tablet by mouth Every Night.      Last office visit with prescribing clinician: 12/20/2023   Last telemedicine visit with prescribing clinician: Visit date not found   Next office visit with prescribing clinician: 6/24/2024                         Would you like a call back once the refill request has been completed: [] Yes [] No    If the office needs to give you a call back, can they leave a voicemail: [] Yes [] No    ARLEEN Bloom  06/06/24, 10:20 EDT

## 2024-06-06 NOTE — TELEPHONE ENCOUNTER
Addended by: JEOVANNY GARRISON on: 11/23/2022 06:53 PM     Modules accepted: Orders     Rx Refill Note  Requested Prescriptions     Pending Prescriptions Disp Refills    traZODone (DESYREL) 100 MG tablet 90 tablet 1     Sig: Take 1 tablet by mouth Every Night.      Last office visit with prescribing clinician: 12/20/2023   Last telemedicine visit with prescribing clinician: Visit date not found   Next office visit with prescribing clinician: 6/24/2024                         Would you like a call back once the refill request has been completed: [] Yes [] No    If the office needs to give you a call back, can they leave a voicemail: [] Yes [] No    Sparkle Trammell CMA  06/06/24, 10:22 EDT

## 2024-06-13 ENCOUNTER — TELEPHONE (OUTPATIENT)
Dept: SLEEP MEDICINE | Facility: HOSPITAL | Age: 59
End: 2024-06-13
Payer: MEDICARE

## 2024-06-13 ENCOUNTER — OFFICE VISIT (OUTPATIENT)
Dept: SLEEP MEDICINE | Facility: HOSPITAL | Age: 59
End: 2024-06-13
Payer: MEDICARE

## 2024-06-13 VITALS
HEIGHT: 60 IN | SYSTOLIC BLOOD PRESSURE: 111 MMHG | OXYGEN SATURATION: 95 % | WEIGHT: 192 LBS | HEART RATE: 58 BPM | BODY MASS INDEX: 37.69 KG/M2 | DIASTOLIC BLOOD PRESSURE: 69 MMHG

## 2024-06-13 DIAGNOSIS — G47.33 OBSTRUCTIVE SLEEP APNEA, ADULT: Primary | ICD-10-CM

## 2024-06-13 DIAGNOSIS — E66.01 CLASS 2 SEVERE OBESITY WITH SERIOUS COMORBIDITY AND BODY MASS INDEX (BMI) OF 37.0 TO 37.9 IN ADULT, UNSPECIFIED OBESITY TYPE: ICD-10-CM

## 2024-06-13 DIAGNOSIS — F32.A DEPRESSION, UNSPECIFIED DEPRESSION TYPE: ICD-10-CM

## 2024-06-13 PROCEDURE — G0463 HOSPITAL OUTPT CLINIC VISIT: HCPCS

## 2024-06-13 NOTE — TELEPHONE ENCOUNTER
Received pressure change from Dr. Wilson.   Changes made in AIRVIEW     06/13/2024, 09:51 AM    by Malachi Merino    Settings sent to device    Request [id number]    Set Mode to AutoSet for Her  Set Min Pressure to 9 cmH2O  Set Max Pressure to 20 cmH2O  Set EPR to Fulltime  Set EPR level to 2  Set Ramp enable to On  Set Ramp time to 15 min  Set Start pressure to 9.0 cmH2O  Set Climate Control to Auto  Set Humidifier level to Auto  Set Tube temperature to 66°F (19°C)

## 2024-06-13 NOTE — PROGRESS NOTES
[facility]  [address]  [address]  [place] [address]  Phone   Fax     SLEEP CLINIC FOLLOW UP PROGRESS NOTE.    Aundrea Mcgarry  [id number]   1965  58 y.o.  female      PCP: Spenser Hodgson MD      Date of visit: 6/13/2024    Chief Complaint   Patient presents with    Sleep Apnea       Medications and allergies are reviewed by me and documented in the encounter.     SOCIAL (habits pertaining to sleep medicine)  History tobacco use:No   History of alcohol use: 5-7 per week  Caffeine use: 0-1 beverages/d    HPI:  This is a 58 y.o. PMHx Depression. Here for management of Obstructive Sleep Apnea (ODESSA 6.4/hr on 12/28/2023 PSG - flow limitation noted). Patient is using positive airway pressure therapy and the symptoms of sleep apnea have improved significantly on the therapy. Normally patient goes to bed at midnight and wakes up at 9 AM .  The patient wakes up 0-1 time(s) during the night and has no problem going back to sleep.  Feels refreshed after waking up.     Overall patient's Impression of their PAP therapy is: Going great  Feels more well rested with new mask  Airfit f20 ffm  I recommended is working much better than dreamwear FFM prior mask  Air pressures feel too little starting off     Compliance data as reviewed by me with patient room today:  Date range 3/14/2024 - 6/11/2024  Overall use 94%  4-hour lawanda 74%  Average days used 5 hours 30 minutes  Device AirSense 11 AutoSet  Settings 8-20 cm H2O  EPR 2  Ramp at 8 cm H2O for 15 minutes  95th percentile pressure is 11.2 cm H2O  95th percentile leak 27.6 LPM  AHI is 1.1  DME: [facility]  Current mask: AirFit F20 FFM - no leak  Prior masks used: Dreamwear varient +leak    -Depression  Doing well with lexapro     -Obesity  Wt Readings from Last 3 Encounters:   06/13/24 87.1 kg (192 lb)   03/14/24 85.3 kg (188 lb)   12/20/23 86.2 kg (190 lb)         REVIEW OF SYSTEMS:   Is negative unless otherwise  "noted in HPI  [place] Sleepiness Scale :Total score: 8     Disclaimer History: The above history is based on this sleep physician's in room encounter with the patient. Pre encounter self administered questionnaires are taken into consideration and discussed with patient for any discordance. The above documentation by this sleep physician is the most accurate clinical information determined by in room sleep physician encounter with patient.     PHYSICAL EXAMINATION:  Vitals:    06/13/24 1100   BP: 111/69   Pulse: 58   SpO2: 95%   Weight: 87.1 kg (192 lb)   Height: 152.4 cm (60\")    Body mass index is 37.5 kg/m².   CONSTITUTIONAL: Well appearing, in no overt pain or respiratory distress   NOSE: No septal defect  RESP SYSTEM:  No overt respiratory distress, speaks in clear sentences without dyspnea, no accessory muscle use  CARDIOVASULAR: No edema noted  NEURO: Oriented x 3, no gross focal deficits   Psych: Very pleasant, goal oriented, affect appropriate for encounter    Compliance data as reviewed by me with patient room today:  Date range 3/14/2024 - 6/11/2024  Overall use 94%  4-hour lawanda 74%  Average days used 5 hours 30 minutes  Device AirSense 11 AutoSet  Settings 8-20 cm H2O  EPR 2  Ramp at 8 cm H2O for 15 minutes  95th percentile pressure is 11.2 cm H2O  95th percentile leak 27.6 LPM  AHI is 1.1  DME: [facility]  Current mask: AirFit F20 FFM - no leak  Prior masks used: Dreamwear varient +leak    ASSESSMENT AND PLAN:  Obstructive sleep apnea ( G 47.33).    -Specific Changes made by me today:  I. After review of compliance data, in visit clinical correlation, and through shared decision making: will increase minimum pressure to 9 cm H2O and ramp to 9 cm H2O for 15 minutes  II.  Counseled patient to follow-up with me in  3 months for therapy review.  The symptoms of sleep apnea have improved with the device and the treatment.  Patient's compliance with the device is excellent for treatment of sleep apnea.  I have " independently reviewed the smart card down load and discussed with the patient the download data and encouarged the patient to continue to use the device.The residual AHI is acceptable. The device is benefiting the patient and the device is medically necessary.  Without proper control of sleep apnea and good compliance there is a increased risk for hypertension, diabetes mellitus and nonrestorative sleep with hypersomnia which can increase risk for motor vehicle accidents.  Untreated sleep apnea is also a risk factor for development of atrial fibrillation, pulmonary hypertension, insulin resistance and stroke. The patient is also instructed to get the supplies from the [employer] and and change them on a regular basis.  A prescription for supplies has been sent to the [employer].  I have also discussed the good sleep hygiene habits and adequate amount of sleep needed for good health.  Obesity, with BMI is Body mass index is 37.5 kg/m².. Counseled weight loss will be beneficial for reduction in severity of sleep apnea, healthy diet/exercise to achieve same, follow up with primary care physician for serial monitoring and to further guide management.  Depression Compliant wit home therapies reviewed.  Counseled patient PAP therapy compliance for treatment of obstructive sleep apnea may be beneficial for this comorbid condition.  Follow-up with PCP as previous for Depression.     Follow up in 1 year . Patient's questions were answered.        EMR Dragon/Transcription disclaimer:   Much of this encounter note is an electronic transcription/translation of spoken language to printed text. The electronic translation of spoken language may permit erroneous, or at times, nonsensical words or phrases to be inadvertently transcribed; Although I have reviewed the note for such errors, some may still exist.       NPI #: [id number]    Augustin Wilson, DO  Sleep Medicine  [facility]  06/13/24

## 2024-06-17 ENCOUNTER — LAB (OUTPATIENT)
Dept: LAB | Facility: HOSPITAL | Age: 59
End: 2024-06-17
Payer: MEDICARE

## 2024-06-17 PROCEDURE — 84439 ASSAY OF FREE THYROXINE: CPT | Performed by: FAMILY MEDICINE

## 2024-06-24 ENCOUNTER — OFFICE VISIT (OUTPATIENT)
Dept: INTERNAL MEDICINE | Facility: CLINIC | Age: 59
End: 2024-06-24
Payer: MEDICARE

## 2024-06-24 VITALS
WEIGHT: 194.2 LBS | DIASTOLIC BLOOD PRESSURE: 84 MMHG | HEIGHT: 60 IN | HEART RATE: 55 BPM | SYSTOLIC BLOOD PRESSURE: 116 MMHG | BODY MASS INDEX: 38.13 KG/M2 | TEMPERATURE: 98 F | OXYGEN SATURATION: 95 %

## 2024-06-24 DIAGNOSIS — E55.9 HYPOVITAMINOSIS D: ICD-10-CM

## 2024-06-24 DIAGNOSIS — E03.9 HYPOTHYROIDISM, UNSPECIFIED TYPE: ICD-10-CM

## 2024-06-24 DIAGNOSIS — N64.4 BREAST PAIN, RIGHT: ICD-10-CM

## 2024-06-24 DIAGNOSIS — Z00.00 MEDICARE ANNUAL WELLNESS VISIT, INITIAL: ICD-10-CM

## 2024-06-24 DIAGNOSIS — R23.4 CHANGE OF SKIN OF BREAST: Primary | ICD-10-CM

## 2024-06-24 PROBLEM — G47.33 OSA ON CPAP: Status: ACTIVE | Noted: 2024-06-24

## 2024-06-24 PROCEDURE — 1159F MED LIST DOCD IN RCRD: CPT | Performed by: FAMILY MEDICINE

## 2024-06-24 PROCEDURE — 1160F RVW MEDS BY RX/DR IN RCRD: CPT | Performed by: FAMILY MEDICINE

## 2024-06-24 PROCEDURE — 1125F AMNT PAIN NOTED PAIN PRSNT: CPT | Performed by: FAMILY MEDICINE

## 2024-06-24 PROCEDURE — 96160 PT-FOCUSED HLTH RISK ASSMT: CPT | Performed by: FAMILY MEDICINE

## 2024-06-24 PROCEDURE — G0438 PPPS, INITIAL VISIT: HCPCS | Performed by: FAMILY MEDICINE

## 2024-06-24 NOTE — PROGRESS NOTES
The ABCs of the Annual Wellness Visit  Initial Medicare Wellness Visit    Subjective     Aundrea Mcgarry is a 58 y.o. female who presents for an Initial Medicare Wellness Visit.    The following portions of the patient's history were reviewed and   updated as appropriate: allergies, current medications, past family history, past medical history, past social history, past surgical history, and problem list.     Compared to one year ago, the patient feels her physical   health is worse.    Compared to one year ago, the patient feels her mental   health is worse.    Recent Hospitalizations:  She was not admitted to the hospital during the last year.       Current Medical Providers:  Patient Care Team:  Spenser Hodgson MD as PCP - General  Spenser Hodgson MD as PCP - Family Medicine  Hollis Chowdhury MD as Consulting Physician (Cardiology)  BjOleksandr trivedi MD as Consulting Physician (Gastroenterology)    Outpatient Medications Prior to Visit   Medication Sig Dispense Refill    acyclovir (ZOVIRAX) 400 MG tablet Take 1 tablet by mouth 5 (Five) Times a Day. 30 tablet 4    aluminum hydroxide-mag carbonate (GAVISCON EXTRA RELIEF) 160-105 MG chewable tablet chewable tablet Chew 1-2 tablets As Needed.      buPROPion XL (WELLBUTRIN XL) 300 MG 24 hr tablet Take 1 tablet by mouth Every Morning. 90 tablet 1    CBD (cannabidiol) oral oil Take  by mouth Daily.      Collagen-Vitamin C-Biotin (Collagen 1500/C) 500-50-0.8 MG capsule       cyclobenzaprine (FLEXERIL) 5 MG tablet Take 1-2 tablets by mouth 3 (Three) Times a Day As Needed for Muscle Spasms. 45 tablet 1    escitalopram (LEXAPRO) 20 MG tablet Take 1 tablet by mouth Daily. 90 tablet 1    famotidine (PEPCID) 40 MG tablet TAKE 1 TABLET BY MOUTH TWICE A DAY WITH LUNCH AND WITH DINNER 90 tablet 3    gabapentin (NEURONTIN) 300 MG capsule Take 1 capsule by mouth 2 (Two) Times a Day. 60 capsule 5    ibuprofen (ADVIL,MOTRIN) 200 MG tablet Take 3 tablets by  mouth Every 6 (Six) Hours As Needed for Mild Pain.      lansoprazole (PREVACID) 30 MG capsule Take 1 capsule by mouth 2 (Two) Times a Day. 180 capsule 3    levothyroxine (SYNTHROID, LEVOTHROID) 125 MCG tablet Take 1 tablet by mouth Daily. 90 tablet 1    metoprolol tartrate (LOPRESSOR) 25 MG tablet Take 1 tablet by mouth 2 (Two) Times a Day. 180 tablet 1    MULTIPLE VITAMIN PO Take 1 tablet by mouth Daily.      oxybutynin XL (DITROPAN-XL) 5 MG 24 hr tablet Take 1 tablet by mouth Daily. 90 tablet 1    traZODone (DESYREL) 100 MG tablet Take 1 tablet by mouth Every Night. 90 tablet 1    vitamin B-12 (CYANOCOBALAMIN) 100 MCG tablet Take 0.5 tablets by mouth Daily.       No facility-administered medications prior to visit.       No opioid medication identified on active medication list. I have reviewed chart for other potential  high risk medication/s and harmful drug interactions in the elderly.        Aspirin is not on active medication list.  Aspirin use is not indicated based on review of current medical condition/s. Risk of harm outweighs potential benefits.  .    Patient Active Problem List   Diagnosis    Arthritis    Bunion    Gastroesophageal reflux disease with esophagitis    Dyskinesia of esophagus    Ventricular premature beats    Recurrent herpes labialis    Hypothyroidism    Chronic left shoulder pain    Bursitis/tendonitis, shoulder    Routine health maintenance    Depression with anxiety    Dysphagia    Osteopenia of both thighs    Hiatal hernia    OAB (overactive bladder)    Pulmonary nodule, right    Umbilical hernia with obstruction, without gangrene    Carpal tunnel syndrome on both sides    Myalgia    History of colon polyps    ANAN on CPAP     Advance Care Planning   Advance Care Planning     Advance Directive is not on file.  ACP discussion was held with the patient during this visit. Patient does not have an advance directive, information provided.       Objective    Vitals:    06/24/24 0949   BP:  "116/84   BP Location: Left arm   Patient Position: Sitting   Cuff Size: Large Adult   Pulse: 55   Temp: 98 °F (36.7 °C)   TempSrc: Infrared   SpO2: 95%   Weight: 88.1 kg (194 lb 3.2 oz)   Height: 152.4 cm (60\")     Estimated body mass index is 37.93 kg/m² as calculated from the following:    Height as of this encounter: 152.4 cm (60\").    Weight as of this encounter: 88.1 kg (194 lb 3.2 oz).           Does the patient have evidence of cognitive impairment?   No          HEALTH RISK ASSESSMENT    Smoking Status:  Social History     Tobacco Use   Smoking Status Former    Current packs/day: 0.00    Average packs/day: 0.3 packs/day for 3.0 years (0.8 ttl pk-yrs)    Types: Cigarettes    Start date: 2001    Quit date: 2004    Years since quittin.0    Passive exposure: Past   Smokeless Tobacco Never     Alcohol Consumption:  Social History     Substance and Sexual Activity   Alcohol Use Yes    Alcohol/week: 7.0 standard drinks of alcohol    Types: 7 Glasses of wine per week    Comment: DAILY GLASS OF WINE     Fall Risk Screen:    CITLALY Fall Risk Assessment was completed, and patient is at LOW risk for falls.Assessment completed on:2024    Depression Screen:       2024     9:51 AM   PHQ-2/PHQ-9 Depression Screening   Little Interest or Pleasure in Doing Things 1-->several days   Feeling Down, Depressed or Hopeless 1-->several days   Trouble Falling or Staying Asleep, or Sleeping Too Much 0-->not at all   Feeling Tired or Having Little Energy 1-->several days   Poor Appetite or Overeating 0-->not at all   Feeling Bad about Yourself - or that You are a Failure or Have Let Yourself or Your Family Down 0-->not at all   Trouble Concentrating on Things, Such as Reading the Newspaper or Watching Television 0-->not at all   Moving or Speaking So Slowly that Other People Could Have Noticed? Or the Opposite - Being So Fidgety 0-->not at all   Thoughts that You Would be Better Off Dead or of Hurting Yourself " in Some Way 0-->not at all   PHQ-9: Brief Depression Severity Measure Score 3   If You Checked Off Any Problems, How Difficult Have These Problems Made It For You to Do Your Work, Take Care of Things at Home, or Get Along with Other People? not difficult at all       Health Habits and Functional and Cognitive Screenin/22/2024    12:56 PM   Functional & Cognitive Status   Do you have difficulty preparing food and eating? No   Do you have difficulty bathing yourself, getting dressed or grooming yourself? No   Do you have difficulty using the toilet? No   Do you have difficulty moving around from place to place? No   Do you have trouble with steps or getting out of a bed or a chair? No   Current Diet Well Balanced Diet   Dental Exam Up to date   Eye Exam Up to date   Exercise (times per week) 2 times per week   Current Exercises Include House Cleaning;Swimming   Do you need help using the phone?  No   Are you deaf or do you have serious difficulty hearing?  No   Do you need help to go to places out of walking distance? No   Do you need help shopping? No   Do you need help preparing meals?  No   Do you need help with housework?  Yes   Do you need help with laundry? Yes   Do you need help taking your medications? No   Do you need help managing money? No   Do you ever drive or ride in a car without wearing a seat belt? No   Have you felt unusual stress, anger or loneliness in the last month? No   Who do you live with? Spouse   If you need help, do you have trouble finding someone available to you? No   Have you been bothered in the last four weeks by sexual problems? No   Do you have difficulty concentrating, remembering or making decisions? No       Age-appropriate Screening Schedule:  Refer to the list below for future screening recommendations based on patient's age, sex and/or medical conditions. Orders for these recommended tests are listed in the plan section. The patient has been provided with a written  plan.    Health Maintenance   Topic Date Due    ZOSTER VACCINE (1 of 2) Never done    PAP SMEAR  07/01/2023    COVID-19 Vaccine (4 - 2023-24 season) 09/01/2023    INFLUENZA VACCINE  08/01/2024    BMI FOLLOWUP  11/06/2024    ANNUAL WELLNESS VISIT  06/24/2025    MAMMOGRAM  02/23/2026    TDAP/TD VACCINES (3 - Td or Tdap) 09/22/2031    COLORECTAL CANCER SCREENING  06/30/2033    HEPATITIS C SCREENING  Completed    Pneumococcal Vaccine 0-64  Aged Out          CMS Preventative Services Quick Reference  Risk Factors Identified During Encounter    Immunizations Discussed/Encouraged: Shingrix    The above risks/problems have been discussed with the patient.  Pertinent information has been shared with the patient in the After Visit Summary.  An After Visit Summary and PPPS were made available to the patient.  Diagnoses and all orders for this visit:    1. Change of skin of breast (Primary)    2. Medicare annual wellness visit, initial  -     Mammo Diagnostic Digital Tomosynthesis Right With CAD; Future  -     US breast right limited; Future  -     Comprehensive Metabolic Panel; Future  -     Hemoglobin A1c; Future  -     Vitamin B12; Future    3. Breast pain, right  -     Mammo Diagnostic Digital Tomosynthesis Right With CAD; Future  -     US breast right limited; Future    4. Hypothyroidism, unspecified type  -     Lipid Panel With / Chol / HDL Ratio; Future  -     TSH; Future  -     CBC Auto Differential; Future  -     T4, Free; Future    5. Hypovitaminosis D  -     Vitamin D,25-Hydroxy; Future      1. Depression with anxiety.  Doing okay with bupropion and escitalopram.     2. OAB.  Controlled with dietary measures and oxybutynin.     3. Recurrent herpes labialis.  No flare.  Continue prn acyclovir.     4. Myalgias.  Doing okay with gabapentin.  Rx given for PT for Kort.     5. Hypothyroidism.  Replaced with levothyroxine 125 mcg daily.       6. GERD, esophageal dyskinesia.  On BID lansoprazole and BID famotidine,  trazodone.  She sees GI and had EGD 6/2023 which showed gastritis; no Kim's.     7. PVCs.  Symptoms improved with metoprolol.  Continue same.     8. History of colon polyps.  6/2023, Dr. Lorenzo.  Recall 7 years.    9. ANNA on CPAP.  She has adjusted to it and feels better with it.  Using and benefiting from CPAP.  Sees Dr. Augustin Wilson.      10. RHM.  Mammogram UTD; see #11.  Pap smear not needed due to hysterectomy.  Tdap UTD. Flu and Covid-19 vaccines at pharmacy.  Pt had dose pneumovax.  Shingrix at pharmacy.      11. Skin change of right breast X 3-4 months.  PE reveals slight hyperpigmentation near areola 10:00 position with subcutaneous edema.  Check diagnostic mammo and u/s.    Follow Up:  Next Medicare Wellness visit to be scheduled in 1 year.

## 2024-07-05 NOTE — TELEPHONE ENCOUNTER
Rx Refill Note  Requested Prescriptions     Pending Prescriptions Disp Refills    metoprolol tartrate (LOPRESSOR) 25 MG tablet 180 tablet 1     Sig: Take 1 tablet by mouth 2 (Two) Times a Day.      Last office visit with prescribing clinician: 6/24/2024   Last telemedicine visit with prescribing clinician: Visit date not found   Next office visit with prescribing clinician: 12/30/2024                         Would you like a call back once the refill request has been completed: [] Yes [] No    If the office needs to give you a call back, can they leave a voicemail: [] Yes [] No    Eneida Banegas MA  07/05/24, 11:05 EDT

## 2024-07-16 DIAGNOSIS — E03.9 HYPOTHYROIDISM, UNSPECIFIED TYPE: ICD-10-CM

## 2024-07-16 RX ORDER — LEVOTHYROXINE SODIUM 0.12 MG/1
125 TABLET ORAL DAILY
Qty: 90 TABLET | Refills: 1 | Status: SHIPPED | OUTPATIENT
Start: 2024-07-16

## 2024-07-16 NOTE — TELEPHONE ENCOUNTER
Rx Refill Note  Requested Prescriptions     Pending Prescriptions Disp Refills    levothyroxine (SYNTHROID, LEVOTHROID) 125 MCG tablet 90 tablet 1     Sig: Take 1 tablet by mouth Daily.      Last office visit with prescribing clinician: 6/24/2024   Last telemedicine visit with prescribing clinician: Visit date not found   Next office visit with prescribing clinician: 12/30/2024                         Would you like a call back once the refill request has been completed: [] Yes [] No    If the office needs to give you a call back, can they leave a voicemail: [] Yes [] No    Eneida Banegas MA  07/16/24, 16:06 EDT

## 2024-07-16 NOTE — TELEPHONE ENCOUNTER
Rx Refill Note  Requested Prescriptions     Pending Prescriptions Disp Refills    levothyroxine (SYNTHROID, LEVOTHROID) 125 MCG tablet 90 tablet 1     Sig: Take 1 tablet by mouth Daily.      Last office visit with prescribing clinician: 6/24/2024   Last telemedicine visit with prescribing clinician: Visit date not found   Next office visit with prescribing clinician: 12/30/2024                         Would you like a call back once the refill request has been completed: [] Yes [] No    If the office needs to give you a call back, can they leave a voicemail: [] Yes [] No    Jayson Longo, PCT  07/16/24, 14:21 EDT

## 2024-07-17 DIAGNOSIS — R59.0 AXILLARY LYMPHADENOPATHY: Primary | ICD-10-CM

## 2024-07-26 DIAGNOSIS — F41.8 DEPRESSION WITH ANXIETY: ICD-10-CM

## 2024-07-26 RX ORDER — CYCLOBENZAPRINE HCL 5 MG
5-10 TABLET ORAL 3 TIMES DAILY PRN
Qty: 45 TABLET | Refills: 1 | Status: SHIPPED | OUTPATIENT
Start: 2024-07-26

## 2024-07-26 RX ORDER — GABAPENTIN 300 MG/1
300 CAPSULE ORAL 2 TIMES DAILY
Qty: 60 CAPSULE | Refills: 0 | Status: SHIPPED | OUTPATIENT
Start: 2024-07-26

## 2024-08-02 ENCOUNTER — HOSPITAL ENCOUNTER (OUTPATIENT)
Dept: ULTRASOUND IMAGING | Facility: HOSPITAL | Age: 59
Discharge: HOME | End: 2024-08-02
Payer: MEDICARE

## 2024-08-02 ENCOUNTER — HOSPITAL ENCOUNTER (OUTPATIENT)
Dept: MAMMOGRAPHY | Facility: HOSPITAL | Age: 59
Discharge: HOME | End: 2024-08-02
Payer: MEDICARE

## 2024-08-02 DIAGNOSIS — N64.4 BREAST PAIN, RIGHT: ICD-10-CM

## 2024-08-02 DIAGNOSIS — Z00.00 MEDICARE ANNUAL WELLNESS VISIT, INITIAL: ICD-10-CM

## 2024-08-02 PROCEDURE — 76642 ULTRASOUND BREAST LIMITED: CPT

## 2024-08-02 PROCEDURE — 77065 DX MAMMO INCL CAD UNI: CPT

## 2024-08-02 PROCEDURE — G0279 TOMOSYNTHESIS, MAMMO: HCPCS

## 2024-08-14 DIAGNOSIS — F41.8 DEPRESSION WITH ANXIETY: ICD-10-CM

## 2024-08-14 DIAGNOSIS — N32.81 OAB (OVERACTIVE BLADDER): ICD-10-CM

## 2024-08-14 RX ORDER — OXYBUTYNIN CHLORIDE 5 MG/1
5 TABLET, EXTENDED RELEASE ORAL DAILY
Qty: 90 TABLET | Refills: 1 | Status: SHIPPED | OUTPATIENT
Start: 2024-08-14

## 2024-08-14 RX ORDER — BUPROPION HYDROCHLORIDE 300 MG/1
300 TABLET ORAL EVERY MORNING
Qty: 90 TABLET | Refills: 1 | Status: SHIPPED | OUTPATIENT
Start: 2024-08-14

## 2024-08-14 NOTE — TELEPHONE ENCOUNTER
Rx Refill Note  Requested Prescriptions     Pending Prescriptions Disp Refills    buPROPion XL (WELLBUTRIN XL) 300 MG 24 hr tablet 90 tablet 1     Sig: Take 1 tablet by mouth Every Morning.    oxybutynin XL (DITROPAN-XL) 5 MG 24 hr tablet 90 tablet 1     Sig: Take 1 tablet by mouth Daily.      Last office visit with prescribing clinician: 6/24/2024   Last telemedicine visit with prescribing clinician: Visit date not found   Next office visit with prescribing clinician: 12/30/2024                         Would you like a call back once the refill request has been completed: [] Yes [] No    If the office needs to give you a call back, can they leave a voicemail: [] Yes [] No    Eneida Banegas MA  08/14/24, 14:44 EDT

## 2024-08-21 DIAGNOSIS — F41.8 DEPRESSION WITH ANXIETY: ICD-10-CM

## 2024-08-21 RX ORDER — GABAPENTIN 300 MG/1
300 CAPSULE ORAL 2 TIMES DAILY
Qty: 60 CAPSULE | Refills: 5 | Status: SHIPPED | OUTPATIENT
Start: 2024-08-21

## 2024-08-21 NOTE — TELEPHONE ENCOUNTER
Urine Toxicology Performed in Last 12 Months    Medication not refilled in past 28 days     Rx Refill Note  Requested Prescriptions     Pending Prescriptions Disp Refills    gabapentin (NEURONTIN) 300 MG capsule [Pharmacy Med Name: GABAPENTIN 300 MG CAPSULE] 60 capsule      Sig: TAKE 1 CAPSULE BY MOUTH 2 TIMES A DAY      Last office visit with prescribing clinician: Visit date not found   Last telemedicine visit with prescribing clinician: Visit date not found   Next office visit with prescribing clinician: Visit date not found                         Would you like a call back once the refill request has been completed: [] Yes [] No    If the office needs to give you a call back, can they leave a voicemail: [] Yes [] No    Jomar Magaña MA  08/21/24, 13:39 EDT

## 2024-08-29 RX ORDER — ACYCLOVIR 400 MG/1
400 TABLET ORAL
Qty: 30 TABLET | Refills: 4 | Status: SHIPPED | OUTPATIENT
Start: 2024-08-29

## 2024-08-29 RX ORDER — ACYCLOVIR 400 MG/1
400 TABLET ORAL
Qty: 30 TABLET | Refills: 4 | OUTPATIENT
Start: 2024-08-29

## 2024-09-05 ENCOUNTER — TELEPHONE (OUTPATIENT)
Dept: INTERNAL MEDICINE | Facility: CLINIC | Age: 59
End: 2024-09-05
Payer: MEDICARE

## 2024-09-05 NOTE — TELEPHONE ENCOUNTER
----- Message from [facility] sent at 9/3/2024  5:10 PM EDT -----  Regarding: Appointment scheduled from [facility]  Contact: [phone]  Appointment For: Aundrea Mcgarry ([id number])  Visit Type: OFFICE VISIT (1004)    9/9/2024    11:30 AM  15 mins.  [facility]    Patient Comments:  Severe pain continuing under left arm. Now beginning on right side as well

## 2024-09-05 NOTE — TELEPHONE ENCOUNTER
OK FOR HUB TO READ.  LVM for patient to give her the option to be seen this afternoon so she will not have to wait and be in pain over the weekend.

## 2024-09-09 ENCOUNTER — OFFICE VISIT (OUTPATIENT)
Dept: INTERNAL MEDICINE | Facility: CLINIC | Age: 59
End: 2024-09-09
Payer: MEDICARE

## 2024-09-09 VITALS
HEART RATE: 68 BPM | HEIGHT: 60 IN | SYSTOLIC BLOOD PRESSURE: 108 MMHG | TEMPERATURE: 97.5 F | OXYGEN SATURATION: 95 % | BODY MASS INDEX: 37.69 KG/M2 | WEIGHT: 192 LBS | DIASTOLIC BLOOD PRESSURE: 64 MMHG

## 2024-09-09 DIAGNOSIS — M25.512 CHRONIC LEFT SHOULDER PAIN: Primary | ICD-10-CM

## 2024-09-09 DIAGNOSIS — G89.29 CHRONIC LEFT SHOULDER PAIN: Primary | ICD-10-CM

## 2024-09-09 PROCEDURE — 1125F AMNT PAIN NOTED PAIN PRSNT: CPT | Performed by: FAMILY MEDICINE

## 2024-09-09 PROCEDURE — 1159F MED LIST DOCD IN RCRD: CPT | Performed by: FAMILY MEDICINE

## 2024-09-09 PROCEDURE — 99213 OFFICE O/P EST LOW 20 MIN: CPT | Performed by: FAMILY MEDICINE

## 2024-09-09 PROCEDURE — 1160F RVW MEDS BY RX/DR IN RCRD: CPT | Performed by: FAMILY MEDICINE

## 2024-09-09 RX ORDER — PREDNISONE 10 MG/1
20 TABLET ORAL DAILY
Qty: 10 TABLET | Refills: 0 | Status: SHIPPED | OUTPATIENT
Start: 2024-09-09 | End: 2024-09-10

## 2024-09-09 NOTE — PROGRESS NOTES
Subjective   Aundrea Mcgarry is a 58 y.o. female presenting today for follow up of   Chief Complaint   Patient presents with    Arm Pain     Ongoing pain on back side of left arm, pain is now constant and is now feeling some pain in right arm also. Discussed at visit on 6/24.       History of Present Illness     Pt presents with posterior inferior shoulder and upper arm pain, worsening X 8 months.  The pain is primarily on the left but she can also feel similar pain on the right.  She has an appointment pending with her orthopedist, Dr. Olmos, in 10 days.  She had an axillary u/s on the left, as she thought she could feel some swelling or a node, which was negative.  She said it feels inflamed, like it should be hot.  She has not actually noticed redness or warmth.  She doesn't think she is overusing her shoulders with any of her activities.    Patient Active Problem List   Diagnosis    Arthritis    Bunion    Gastroesophageal reflux disease with esophagitis    Dyskinesia of esophagus    Ventricular premature beats    Recurrent herpes labialis    Hypothyroidism    Chronic left shoulder pain    Bursitis/tendonitis, shoulder    Routine health maintenance    Depression with anxiety    Dysphagia    Osteopenia of both thighs    Hiatal hernia    OAB (overactive bladder)    Pulmonary nodule, right    Umbilical hernia with obstruction, without gangrene    Carpal tunnel syndrome on both sides    Myalgia    History of colon polyps    ANNA on CPAP       Current Outpatient Medications on File Prior to Visit   Medication Sig    acyclovir (ZOVIRAX) 400 MG tablet Take 1 tablet by mouth 5 (Five) Times a Day.    aluminum hydroxide-mag carbonate (GAVISCON EXTRA RELIEF) 160-105 MG chewable tablet chewable tablet Chew 1-2 tablets As Needed.    buPROPion XL (WELLBUTRIN XL) 300 MG 24 hr tablet Take 1 tablet by mouth Every Morning.    Collagen-Vitamin C-Biotin (Collagen 1500/C) 500-50-0.8 MG capsule     cyclobenzaprine (FLEXERIL) 5 MG  "tablet Take 1-2 tablets by mouth 3 (Three) Times a Day As Needed for Muscle Spasms.    escitalopram (LEXAPRO) 20 MG tablet Take 1 tablet by mouth Daily.    famotidine (PEPCID) 40 MG tablet TAKE 1 TABLET BY MOUTH TWICE A DAY WITH LUNCH AND WITH DINNER    gabapentin (NEURONTIN) 300 MG capsule TAKE 1 CAPSULE BY MOUTH 2 TIMES A DAY    ibuprofen (ADVIL,MOTRIN) 200 MG tablet Take 3 tablets by mouth Every 6 (Six) Hours As Needed for Mild Pain.    lansoprazole (PREVACID) 30 MG capsule Take 1 capsule by mouth 2 (Two) Times a Day.    levothyroxine (SYNTHROID, LEVOTHROID) 125 MCG tablet Take 1 tablet by mouth Daily.    metoprolol tartrate (LOPRESSOR) 25 MG tablet Take 1 tablet by mouth 2 (Two) Times a Day.    MULTIPLE VITAMIN PO Take 1 tablet by mouth Daily.    oxybutynin XL (DITROPAN-XL) 5 MG 24 hr tablet Take 1 tablet by mouth Daily.    traZODone (DESYREL) 100 MG tablet Take 1 tablet by mouth Every Night.    vitamin B-12 (CYANOCOBALAMIN) 100 MCG tablet Take 0.5 tablets by mouth Daily.    [DISCONTINUED] CBD (cannabidiol) oral oil Take  by mouth Daily.     No current facility-administered medications on file prior to visit.          The following portions of the patient's history were reviewed and updated as appropriate: allergies, current medications, past family history, past medical history, past social history, past surgical history and problem list.    Review of Systems   Musculoskeletal:  Positive for arthralgias and joint swelling.   Neurological:  Negative for numbness.       Objective   Vitals:    09/09/24 1135   BP: 108/64   BP Location: Left arm   Patient Position: Sitting   Cuff Size: Adult   Pulse: 68   Temp: 97.5 °F (36.4 °C)   TempSrc: Infrared   SpO2: 95%   Weight: 87.1 kg (192 lb)   Height: 152.4 cm (60\")       BP Readings from Last 3 Encounters:   09/09/24 108/64   06/24/24 116/84   06/13/24 111/69        Wt Readings from Last 3 Encounters:   09/09/24 87.1 kg (192 lb)   06/24/24 88.1 kg (194 lb 3.2 oz) "   06/13/24 87.1 kg (192 lb)        Body mass index is 37.5 kg/m².  Nursing notes and vitals reviewed.    Physical Exam  Vitals and nursing note reviewed.   Constitutional:       General: She is not in acute distress.     Appearance: Normal appearance.   HENT:      Head: Normocephalic and atraumatic.      Mouth/Throat:      Mouth: Mucous membranes are moist.   Eyes:      Extraocular Movements: Extraocular movements intact.      Conjunctiva/sclera: Conjunctivae normal.   Pulmonary:      Effort: Pulmonary effort is normal. No respiratory distress.   Musculoskeletal:      Right shoulder: Normal. Normal range of motion.      Left shoulder: Tenderness present. No swelling, deformity, bony tenderness or crepitus. Normal range of motion. Normal strength.        Arms:       Cervical back: Neck supple. No rigidity.   Skin:     General: Skin is warm and dry.   Neurological:      General: No focal deficit present.      Mental Status: She is alert and oriented to person, place, and time.   Psychiatric:         Mood and Affect: Mood normal.         Behavior: Behavior normal.         No results found for this or any previous visit (from the past 672 hour(s)).      Assessment & Plan   Diagnoses and all orders for this visit:    1. Chronic left shoulder pain (Primary)  -     predniSONE (DELTASONE) 10 MG tablet; Take 2 tablets by mouth Daily for 5 days.  Dispense: 10 tablet; Refill: 0      Will treat with a 5 day course of prednisone.  Defer imaging to Dr. Olmos, as she has an appointment upcoming.  Warning s/sxs and anticipatory guidance discussed.        Medications, including side effects, were discussed with the patient. Patient verbalized understanding.  The plan of care was discussed. All questions were answered. Patient verbalized understanding.      Return for Next scheduled follow up.              Answers submitted by the patient for this visit:  Primary Reason for Visit (Submitted on 9/3/2024)  What is the primary reason  for your visit?: Extremity Pain  Lower Extremity Injury Questionnaire (Submitted on 9/3/2024)  Chief Complaint: Extremity pain  Injury: No

## 2024-09-10 ENCOUNTER — OFFICE VISIT (OUTPATIENT)
Dept: GASTROENTEROLOGY | Facility: CLINIC | Age: 59
End: 2024-09-10
Payer: MEDICARE

## 2024-09-10 VITALS
WEIGHT: 194.4 LBS | BODY MASS INDEX: 38.17 KG/M2 | HEIGHT: 60 IN | DIASTOLIC BLOOD PRESSURE: 74 MMHG | SYSTOLIC BLOOD PRESSURE: 116 MMHG

## 2024-09-10 DIAGNOSIS — R10.12 POSTPRANDIAL ABDOMINAL PAIN IN LEFT UPPER QUADRANT: Primary | ICD-10-CM

## 2024-09-10 DIAGNOSIS — K21.00 GASTROESOPHAGEAL REFLUX DISEASE WITH ESOPHAGITIS WITHOUT HEMORRHAGE: ICD-10-CM

## 2024-09-10 DIAGNOSIS — K59.04 CHRONIC IDIOPATHIC CONSTIPATION: ICD-10-CM

## 2024-09-10 DIAGNOSIS — K22.4 ESOPHAGEAL SPASM: ICD-10-CM

## 2024-09-10 RX ORDER — VONOPRAZAN FUMARATE 26.72 MG/1
20 TABLET ORAL DAILY
Qty: 30 TABLET | Refills: 1 | Status: SHIPPED | OUTPATIENT
Start: 2024-09-10

## 2024-09-10 RX ORDER — VONOPRAZAN FUMARATE 26.72 MG/1
20 TABLET ORAL DAILY
Qty: 30 TABLET | Refills: 1 | COMMUNITY
Start: 2024-09-10 | End: 2024-09-10 | Stop reason: SDUPTHER

## 2024-09-10 NOTE — PROGRESS NOTES
PATIENT INFORMATION  Aundrea Mcgarry       - 1965    CHIEF COMPLAINT  Chief Complaint   Patient presents with    Esophageal Spasm    Abdominal Pain    Heartburn       HISTORY OF PRESENT ILLNESS  Here today for much delayed EGD and Colonoscopy follow-up    2023 EGD with chronic gastritis, reflux esophagitis (Grade B), no HP or barretts. Attempted dilation, no stricture.  2023 Colonoscopy 2/2 adenomas  Reviewed path and images with patient.    GERD: Lansoprazole and famotidine BID, alternating time reviewed at LOV. Spasms at LOV. TODAY: Stomach hurting no matter what she eats across top. Lansoprazole AM and night, famotidine lunch and dinner. Gaviscon for spasms, bad last night, gives relief sometimes longer than others. Happening a few times a week. Nausea sometimes, no vomiting. Ibuprofen nightly. 2 full meals every day.    Fibro worsening.     CIC: Reviewed SS/Miralax at LOV for constipation, was bleeding when stools hard. Bowels are moving every other day, miri tea daily, not having hard stools as often. No SS or laxative.     2017 last EGD with dilatation, no significant trauma, chemical gastris, reflux esophagitis, no HP or dysplasia  12/10/2013 normal colon Dr. Pope, no family hx CRC or polyps.     Abdominal Pain  Associated symptoms include headaches and nausea. Her past medical history is significant for GERD.   Heartburn  She complains of abdominal pain and nausea. Associated symptoms include fatigue.       REVIEWED PERTINENT RESULTS/ LABS  Lab Results   Component Value Date    CASEREPORT  2023     Surgical Pathology Report                         Case: [id number]                                  Authorizing Provider:  Oleksandr Lorenzo        Collected:           2023 12:09 PM                                 MD Westley                                                                   Ordering Location:     [facility]   Received:             06/30/2023 04:37 PM                                 OR                                                                           Pathologist:           Darcy Zamora MD                                                          Specimens:   1) - Stomach                                                                                        2) - Esophagus, Distal                                                                              3) - Large Intestine, Right / Ascending Colon, ascending colon polyp                                4) - Large Intestine, Transverse Colon, transverse colon polyp                             FINALDX  06/30/2023     1. Stomach, Biopsy:   A. Gastric antral and oxyntic mucosa with chronic inactive gastritis.   B. Negative for intestinal metaplasia.   C. H. pylori immunostain is pending with those results to follow as an addendum.    2. Esophagus, Distal, Biopsy:    A. Squamocolumnar mucosa with basal layer hyperplasia, increased intraepithelial lymphocytes and       eosinophils (up to 1/HPF), consistent with reflux esophagitis.   B. Negative for intestinal metaplasia.     3. Ascending Colon, Polyp, Polypectomy:   A. Fragments of tubular adenoma.     4. Transverse Colon, Polyp, Polypectomy:   A. Tubular adenoma.    jab/jse        Lab Results   Component Value Date    HGB 13.7 12/13/2023    MCV 94.8 12/13/2023     12/13/2023    ALT 26 12/13/2023    AST 20 12/13/2023    HGBA1C 5.40 06/12/2023    INR 1.0 09/13/2022    TRIG 80 12/13/2023    FERRITIN 142.00 03/08/2021      No results found.    REVIEW OF SYSTEMS  Review of Systems   Constitutional:  Positive for fatigue.   Eyes: Negative.    Respiratory: Negative.     Cardiovascular: Negative.    Gastrointestinal:  Positive for abdominal distention, abdominal pain and nausea.        PHX polyps, GERD, Esophageal spasm, Gastritis    Endocrine: Negative.    Genitourinary: Negative.    Musculoskeletal:  Positive for back pain and neck  pain.   Skin: Negative.    Neurological:  Positive for dizziness, light-headedness and headaches.   Hematological: Negative.    Psychiatric/Behavioral:  The patient is nervous/anxious.          ACTIVE PROBLEMS  Patient Active Problem List    Diagnosis     ANNA on CPAP [G47.33]     History of colon polyps [Z86.010]     Myalgia [M79.10]     Carpal tunnel syndrome on both sides [G56.03]     Umbilical hernia with obstruction, without gangrene [K42.0]     Pulmonary nodule, right [R91.1]     OAB (overactive bladder) [N32.81]     Hiatal hernia [K44.9]     Osteopenia of both thighs [M85.851, M85.852]     Dysphagia [R13.10]     Depression with anxiety [F41.8]     Routine health maintenance [Z00.00]     Bursitis/tendonitis, shoulder [HFO0445]     Chronic left shoulder pain [M25.512, G89.29]     Arthritis [M19.90]     Bunion [M21.619]     Gastroesophageal reflux disease with esophagitis [K21.00]     Dyskinesia of esophagus [K22.4]     Ventricular premature beats [I49.3]     Recurrent herpes labialis [B00.1]     Hypothyroidism [E03.9]          PAST MEDICAL HISTORY  Past Medical History:   Diagnosis Date    Allergic     Erythromycin    Anxiety and depression     Arthritis     Broken jaw     1995      Cholelithiasis 2017    Colon polyp 2023    Diverticulosis     Esophageal spasm     Fibromyalgia     Fibromyalgia, primary     GERD (gastroesophageal reflux disease)     Headache 2019    Hypertension     Hypothyroidism     IBS (irritable bowel syndrome)     Lactose intolerance     Nausea     chronic    Overactive bladder     Pneumonia     PONV (postoperative nausea and vomiting)     PVC (premature ventricular contraction)     Rheumatoid arthritis     Stroke     Umbilical hernia          SURGICAL HISTORY  Past Surgical History:   Procedure Laterality Date    BREAST BIOPSY Right early 90's    BUNIONECTOMY Right 09/30/2022    CARPAL TUNNEL RELEASE  11/2021    Dr. Das    CHOLECYSTECTOMY  2017    CHOLECYSTECTOMY WITH INTRAOPERATIVE  CHOLANGIOGRAM N/A 06/30/2017    Procedure: CHOLECYSTECTOMY LAPAROSCOPIC INTRAOPERATIVE CHOLANGIOGRAM;  Surgeon: David Kirkland MD;  Location:  [facility];  Service:     COLONOSCOPY      COLONOSCOPY  2013?    DR BRAR    COLONOSCOPY W/ POLYPECTOMY N/A 6/30/2023    Procedure: COLONOSCOPY WITH POLYPECTOMY;  Surgeon: Oleksandr Lorenzo MD;  Location:  [facility];  Service: Gastroenterology;  Laterality: N/A;  ascending colon polyp (cold snare)  transverse colon polyp    ENDOSCOPY N/A 12/01/2017    Procedure: ESOPHAGOGASTRODUODENOSCOPY with esophageal dilitation to 20mm,, and tissue biopsies/ polypectomies esophagus and gastric;  Surgeon: Meliza Pope MD;  Location:  [facility];  Service:     ENDOSCOPY N/A 6/30/2023    Procedure: ESOPHAGOGASTRODUODENOSCOPY WITH BIOPSY;  Surgeon: Oleksandr Lorenzo MD;  Location:  [facility];  Service: Gastroenterology;  Laterality: N/A;  gastritis-gastric biopsy  esophagitis- distal esophagus biopsy    ESOPHAGOSCOPY  6/30/2023    Procedure: ESOPHAGOSCOPY WITH DILATATION;  Surgeon: Oleksandr Lorenzo MD;  Location:  [facility];  Service: Gastroenterology;;  dilate to 18mm    EYE SURGERY Bilateral     LENS REPLACEMENT    HIP SURGERY Left     HYSTERECTOMY      HYSTERECTOMY  2002    INGUINAL HERNIA REPAIR Left     as a child    LASER ABLATION N/A 2002    MANDIBLE FRACTURE SURGERY      SUBTOTAL HYSTERECTOMY  2002    TONSILLECTOMY      TUBAL ABDOMINAL LIGATION  1989    UMBILICAL HERNIA REPAIR N/A 07/31/2020    Procedure: INCISIONAL HERNIA REPAIR WITH MESH;  Surgeon: David Kirkland MD;  Location:  [facility];  Service: General;  Laterality: N/A;    UPPER GASTROINTESTINAL ENDOSCOPY N/A 07/20/2011    Normal upper endosopy - Dr. Meliza Pope         FAMILY HISTORY  Family History   Problem Relation Age of Onset    Thyroid disease Mother     Hypertension Mother     Arthritis Mother     Hearing loss Mother     Diabetes Father     Hypertension Father     Heart disease  Father     Hyperlipidemia Father     Kidney disease Father     Breast cancer Maternal Aunt     Cancer Maternal Aunt         Breast    Breast cancer Paternal Aunt     Heart disease Brother     Anxiety disorder Daughter     Depression Daughter     Mental illness Daughter     Mental illness Daughter     Thyroid disease Daughter     Cancer Maternal Grandfather         lung -     Thyroid disease Maternal Grandmother     Cancer Maternal Aunt         breast - survived    Cancer Paternal Aunt         breast -     Diabetes Brother     Malig Hyperthermia Neg Hx          SOCIAL HISTORY  Social History     Occupational History    Not on file   Tobacco Use    Smoking status: Former     Current packs/day: 0.00     Average packs/day: 0.3 packs/day for 3.0 years (0.8 ttl pk-yrs)     Types: Cigarettes     Start date: 2001     Quit date: 2004     Years since quittin.2     Passive exposure: Past    Smokeless tobacco: Never   Vaping Use    Vaping status: Never Used   Substance and Sexual Activity    Alcohol use: Yes     Alcohol/week: 6.0 standard drinks of alcohol     Types: 5 Glasses of wine, 1 Cans of beer per week     Comment: weekly    Drug use: Never    Sexual activity: Defer         CURRENT MEDICATIONS    Current Outpatient Medications:     acyclovir (ZOVIRAX) 400 MG tablet, Take 1 tablet by mouth 5 (Five) Times a Day., Disp: 30 tablet, Rfl: 4    aluminum hydroxide-mag carbonate (GAVISCON EXTRA RELIEF) 160-105 MG chewable tablet chewable tablet, Chew 1-2 tablets As Needed., Disp: , Rfl:     buPROPion XL (WELLBUTRIN XL) 300 MG 24 hr tablet, Take 1 tablet by mouth Every Morning., Disp: 90 tablet, Rfl: 1    Collagen-Vitamin C-Biotin (Collagen 1500/C) 500-50-0.8 MG capsule, , Disp: , Rfl:     cyclobenzaprine (FLEXERIL) 5 MG tablet, Take 1-2 tablets by mouth 3 (Three) Times a Day As Needed for Muscle Spasms., Disp: 45 tablet, Rfl: 1    escitalopram (LEXAPRO) 20 MG tablet, Take 1 tablet by mouth Daily.,  "Disp: 90 tablet, Rfl: 1    famotidine (PEPCID) 40 MG tablet, TAKE 1 TABLET BY MOUTH TWICE A DAY WITH LUNCH AND WITH DINNER, Disp: 90 tablet, Rfl: 3    gabapentin (NEURONTIN) 300 MG capsule, TAKE 1 CAPSULE BY MOUTH 2 TIMES A DAY, Disp: 60 capsule, Rfl: 5    ibuprofen (ADVIL,MOTRIN) 200 MG tablet, Take 3 tablets by mouth Every 6 (Six) Hours As Needed for Mild Pain., Disp: , Rfl:     levothyroxine (SYNTHROID, LEVOTHROID) 125 MCG tablet, Take 1 tablet by mouth Daily., Disp: 90 tablet, Rfl: 1    metoprolol tartrate (LOPRESSOR) 25 MG tablet, Take 1 tablet by mouth 2 (Two) Times a Day., Disp: 180 tablet, Rfl: 1    MULTIPLE VITAMIN PO, Take 1 tablet by mouth Daily., Disp: , Rfl:     oxybutynin XL (DITROPAN-XL) 5 MG 24 hr tablet, Take 1 tablet by mouth Daily., Disp: 90 tablet, Rfl: 1    traZODone (DESYREL) 100 MG tablet, Take 1 tablet by mouth Every Night., Disp: 90 tablet, Rfl: 1    vitamin B-12 (CYANOCOBALAMIN) 100 MCG tablet, Take 0.5 tablets by mouth Daily., Disp: , Rfl:     Vonoprazan Fumarate (Voquezna) 20 MG tablet, Take 1 tablet by mouth Daily., Disp: 30 tablet, Rfl: 1    ALLERGIES  Erythromycin, Lactose intolerance (gi), and Adhesive tape    VITALS  Vitals:    09/10/24 1107   BP: 116/74   BP Location: Left arm   Patient Position: Sitting   Cuff Size: Large Adult   Weight: 88.2 kg (194 lb 6.4 oz)   Height: 152.4 cm (60\")       PHYSICAL EXAM  Debilities/Disabilities Identified: None  Emotional Behavior: Appropriate  Wt Readings from Last 3 Encounters:   09/10/24 88.2 kg (194 lb 6.4 oz)   09/09/24 87.1 kg (192 lb)   06/24/24 88.1 kg (194 lb 3.2 oz)     Ht Readings from Last 1 Encounters:   09/10/24 152.4 cm (60\")     Body mass index is 37.97 kg/m².  Physical Exam  Constitutional:       General: She is not in acute distress.     Appearance: Normal appearance. She is not ill-appearing.   HENT:      Head: Normocephalic and atraumatic.      Mouth/Throat:      Mouth: Mucous membranes are moist.      Pharynx: No posterior " oropharyngeal erythema.   Eyes:      General: No scleral icterus.  Cardiovascular:      Rate and Rhythm: Normal rate and regular rhythm.      Heart sounds: Normal heart sounds.   Pulmonary:      Effort: Pulmonary effort is normal.      Breath sounds: Normal breath sounds.   Abdominal:      General: Abdomen is flat. Bowel sounds are normal. There is no distension.      Palpations: Abdomen is soft. There is no mass.      Tenderness: There is no abdominal tenderness. There is no guarding or rebound. Negative signs include Castaneda's sign.      Hernia: No hernia is present.   Musculoskeletal:      Cervical back: Neck supple.   Skin:     General: Skin is warm.      Capillary Refill: Capillary refill takes less than 2 seconds.   Neurological:      General: No focal deficit present.      Mental Status: She is alert and oriented to person, place, and time.   Psychiatric:         Mood and Affect: Mood normal.         Behavior: Behavior normal.         Thought Content: Thought content normal.         Judgment: Judgment normal.         CLINICAL DATA REVIEWED   reviewed previous lab results and integrated with today's visit, reviewed notes from other physicians and/or last GI encounter, reviewed previous endoscopy results and available photos, reviewed surgical pathology results from previous biopsies    ASSESSMENT  Diagnoses and all orders for this visit:    Postprandial abdominal pain in left upper quadrant  -     NM Gastric Emptying; Future    Gastroesophageal reflux disease with esophagitis without hemorrhage    Chronic idiopathic constipation    Esophageal spasm    Other orders  -     Discontinue: Vonoprazan Fumarate (Voquezna) 20 MG tablet; Take 1 tablet by mouth Daily.  -     Vonoprazan Fumarate (Voquezna) 20 MG tablet; Take 1 tablet by mouth Daily.          PLAN    GERD/epigastric pain/esophageal spasms (Worsening) : Will switch to Voquezna, continue H2RA  Will get GES due to persistent symptoms, reviewed GP diet, low  acid foods  If no improvement with escalating care would need HREM and consider CCB  CIC: controlled    Return in about 3 months (around 12/10/2024).    I have discussed the above plan with the patient.  They verbalize understanding and are in agreement with the plan.  They have been advised to contact the office for any questions, concerns, or changes related to their health.

## 2024-09-12 ENCOUNTER — TELEPHONE (OUTPATIENT)
Dept: GASTROENTEROLOGY | Facility: CLINIC | Age: 59
End: 2024-09-12
Payer: MEDICARE

## 2024-09-12 NOTE — TELEPHONE ENCOUNTER
Approved today by [facility] 2017  PA Case: [id number], Status: Approved, Coverage Starts on: 1/1/2024 12:00:00 AM, Coverage Ends on: 12/31/2024 12:00:00 AM. Questions? Contact [phone].

## 2024-09-19 ENCOUNTER — OFFICE VISIT (OUTPATIENT)
Dept: ORTHOPEDIC SURGERY | Facility: CLINIC | Age: 59
End: 2024-09-19
Payer: MEDICARE

## 2024-09-19 VITALS — TEMPERATURE: 98.6 F | WEIGHT: 191.8 LBS | BODY MASS INDEX: 37.66 KG/M2 | HEIGHT: 60 IN

## 2024-09-19 DIAGNOSIS — M25.512 LEFT SHOULDER PAIN, UNSPECIFIED CHRONICITY: Primary | ICD-10-CM

## 2024-09-19 DIAGNOSIS — M75.40 IMPINGEMENT SYNDROME OF SHOULDER REGION, UNSPECIFIED LATERALITY: ICD-10-CM

## 2024-09-19 PROCEDURE — 99213 OFFICE O/P EST LOW 20 MIN: CPT | Performed by: ORTHOPAEDIC SURGERY

## 2024-09-20 ENCOUNTER — HOSPITAL ENCOUNTER (OUTPATIENT)
Dept: NUCLEAR MEDICINE | Facility: HOSPITAL | Age: 59
Discharge: HOME | End: 2024-09-20
Payer: MEDICARE

## 2024-09-20 DIAGNOSIS — R10.12 POSTPRANDIAL ABDOMINAL PAIN IN LEFT UPPER QUADRANT: ICD-10-CM

## 2024-09-20 PROCEDURE — 0 TECHNETIUM SULFUR COLLOID

## 2024-09-20 PROCEDURE — 78264 GASTRIC EMPTYING IMG STUDY: CPT

## 2024-09-20 PROCEDURE — A9541 TC99M SULFUR COLLOID: HCPCS

## 2024-09-20 RX ADMIN — TECHNETIUM TC 99M SULFUR COLLOID 1 DOSE: KIT at 07:03

## 2024-09-23 ENCOUNTER — OFFICE VISIT (OUTPATIENT)
Dept: ORTHOPEDIC SURGERY | Facility: CLINIC | Age: 59
End: 2024-09-23
Payer: MEDICARE

## 2024-09-23 VITALS — HEIGHT: 60 IN | TEMPERATURE: 97.3 F | WEIGHT: 195.6 LBS | BODY MASS INDEX: 38.4 KG/M2

## 2024-09-23 DIAGNOSIS — R52 PAIN: ICD-10-CM

## 2024-09-23 DIAGNOSIS — M54.12 CERVICAL RADICULOPATHY: Primary | ICD-10-CM

## 2024-09-23 PROCEDURE — 99213 OFFICE O/P EST LOW 20 MIN: CPT | Performed by: NURSE PRACTITIONER

## 2024-09-23 PROCEDURE — 72040 X-RAY EXAM NECK SPINE 2-3 VW: CPT | Performed by: NURSE PRACTITIONER

## 2024-09-23 PROCEDURE — 1160F RVW MEDS BY RX/DR IN RCRD: CPT | Performed by: NURSE PRACTITIONER

## 2024-09-23 PROCEDURE — 1159F MED LIST DOCD IN RCRD: CPT | Performed by: NURSE PRACTITIONER

## 2024-09-26 ENCOUNTER — OFFICE VISIT (OUTPATIENT)
Dept: ORTHOPEDIC SURGERY | Facility: CLINIC | Age: 59
End: 2024-09-26
Payer: MEDICARE

## 2024-09-26 VITALS — WEIGHT: 195 LBS | BODY MASS INDEX: 38.28 KG/M2 | HEIGHT: 60 IN | TEMPERATURE: 98.6 F

## 2024-09-26 DIAGNOSIS — G89.29 CHRONIC PAIN OF BOTH SHOULDERS: ICD-10-CM

## 2024-09-26 DIAGNOSIS — M75.40 IMPINGEMENT SYNDROME OF SHOULDER REGION, UNSPECIFIED LATERALITY: Primary | ICD-10-CM

## 2024-09-26 DIAGNOSIS — M25.512 CHRONIC PAIN OF BOTH SHOULDERS: ICD-10-CM

## 2024-09-26 DIAGNOSIS — M25.511 CHRONIC PAIN OF BOTH SHOULDERS: ICD-10-CM

## 2024-09-26 RX ORDER — METHYLPREDNISOLONE ACETATE 80 MG/ML
80 INJECTION, SUSPENSION INTRA-ARTICULAR; INTRALESIONAL; INTRAMUSCULAR; SOFT TISSUE
Status: COMPLETED | OUTPATIENT
Start: 2024-09-26 | End: 2024-09-26

## 2024-09-26 RX ORDER — LIDOCAINE HYDROCHLORIDE 10 MG/ML
2 INJECTION, SOLUTION EPIDURAL; INFILTRATION; INTRACAUDAL; PERINEURAL
Status: COMPLETED | OUTPATIENT
Start: 2024-09-26 | End: 2024-09-26

## 2024-09-26 RX ADMIN — LIDOCAINE HYDROCHLORIDE 2 ML: 10 INJECTION, SOLUTION EPIDURAL; INFILTRATION; INTRACAUDAL; PERINEURAL at 09:32

## 2024-09-26 RX ADMIN — METHYLPREDNISOLONE ACETATE 80 MG: 80 INJECTION, SUSPENSION INTRA-ARTICULAR; INTRALESIONAL; INTRAMUSCULAR; SOFT TISSUE at 09:32

## 2024-10-02 DIAGNOSIS — F41.8 DEPRESSION WITH ANXIETY: ICD-10-CM

## 2024-10-02 RX ORDER — ESCITALOPRAM OXALATE 20 MG/1
20 TABLET ORAL DAILY
Qty: 90 TABLET | Refills: 1 | Status: SHIPPED | OUTPATIENT
Start: 2024-10-02

## 2024-10-02 NOTE — TELEPHONE ENCOUNTER
Rx Refill Note  Requested Prescriptions     Pending Prescriptions Disp Refills    escitalopram (LEXAPRO) 20 MG tablet 90 tablet 1     Sig: Take 1 tablet by mouth Daily.      Last office visit with prescribing clinician: 9/9/2024   Last telemedicine visit with prescribing clinician: Visit date not found   Next office visit with prescribing clinician: 12/30/2024                         Would you like a call back once the refill request has been completed: [] Yes [] No    If the office needs to give you a call back, can they leave a voicemail: [] Yes [] No    Eneida Banegas MA  10/02/24, 12:48 EDT

## 2024-10-10 RX ORDER — FAMOTIDINE 40 MG/1
TABLET, FILM COATED ORAL
Qty: 180 TABLET | Refills: 0 | Status: SHIPPED | OUTPATIENT
Start: 2024-10-10

## 2024-10-11 ENCOUNTER — TELEPHONE (OUTPATIENT)
Dept: GASTROENTEROLOGY | Facility: CLINIC | Age: 59
End: 2024-10-11
Payer: MEDICARE

## 2024-10-11 NOTE — TELEPHONE ENCOUNTER
"----- Message from Aleah BOLIVAR sent at 10/11/2024 11:59 AM EDT -----  Regarding: FW: Appointment Request  Contact: [phone]  CAN YOU ASSIST HER? I DONT HAVE ANYTHING  ----- Message -----  From: Aundrea Mcgarry \"Nicole\"  Sent: 10/11/2024  11:43 AM EDT  To: Frankie [facility] [place]  [place]  Subject: Appointment Request                              Appointment Request From: Aundrea Mcgarry    With Provider: Araseli Quinteros [[facility]]    Preferred Date Range: 10/14/2024 – 10/15/2024    Preferred Times: Any Time    Reason for visit: Follow-up    Comments:  URGENT: Need to move my appointment up from 12/10/2024 due continued stomach pain and severe esophegeal spasms.   Thank you,  Aundrea Mcgarry  [phone]  "

## 2024-10-11 NOTE — TELEPHONE ENCOUNTER
"----- Message from Aleah BOLIVAR sent at 10/10/2024  3:56 PM EDT -----  Regarding: FW: Appointment Request  Contact: [phone]  CAN YOU GET HER IN SOON?    THANKS  T  ----- Message -----  From: Aundrea Mcgarry \"Nicole\"  Sent: 10/10/2024   3:40 PM EDT  To: [facility]  Subject: Appointment Request                              Appointment Request From: Aundrea Mcgarry    With Provider: Araseli Quinteros [[facility]]    Preferred Date Range: 10/11/2024 – 10/11/2024    Preferred Times: Any Time    Reason for visit: Follow-up    Comments:  Continued pain in stomach and severe esophageal spasms. Can I move up my 10/21 appointment?  "

## 2024-10-14 ENCOUNTER — OFFICE VISIT (OUTPATIENT)
Dept: GASTROENTEROLOGY | Facility: CLINIC | Age: 59
End: 2024-10-14
Payer: MEDICARE

## 2024-10-14 ENCOUNTER — HOSPITAL ENCOUNTER (OUTPATIENT)
Dept: MRI IMAGING | Facility: HOSPITAL | Age: 59
Discharge: HOME | End: 2024-10-14
Admitting: NURSE PRACTITIONER
Payer: MEDICARE

## 2024-10-14 VITALS
HEIGHT: 60 IN | WEIGHT: 191.6 LBS | SYSTOLIC BLOOD PRESSURE: 120 MMHG | BODY MASS INDEX: 37.62 KG/M2 | DIASTOLIC BLOOD PRESSURE: 80 MMHG

## 2024-10-14 DIAGNOSIS — K22.4 ESOPHAGEAL SPASM: Primary | ICD-10-CM

## 2024-10-14 DIAGNOSIS — M54.12 CERVICAL RADICULOPATHY: ICD-10-CM

## 2024-10-14 DIAGNOSIS — K59.04 CHRONIC IDIOPATHIC CONSTIPATION: ICD-10-CM

## 2024-10-14 PROCEDURE — 99214 OFFICE O/P EST MOD 30 MIN: CPT | Performed by: INTERNAL MEDICINE

## 2024-10-14 PROCEDURE — 1159F MED LIST DOCD IN RCRD: CPT | Performed by: INTERNAL MEDICINE

## 2024-10-14 PROCEDURE — 1160F RVW MEDS BY RX/DR IN RCRD: CPT | Performed by: INTERNAL MEDICINE

## 2024-10-14 PROCEDURE — 72141 MRI NECK SPINE W/O DYE: CPT

## 2024-10-14 RX ORDER — VONOPRAZAN FUMARATE 13.36 MG/1
10 TABLET ORAL DAILY
Qty: 90 TABLET | Refills: 3 | Status: SHIPPED | OUTPATIENT
Start: 2024-10-14 | End: 2024-10-15 | Stop reason: DRUGHIGH

## 2024-10-14 RX ORDER — DILTIAZEM HYDROCHLORIDE 30 MG/1
30 TABLET, FILM COATED ORAL 3 TIMES DAILY
Qty: 90 TABLET | Refills: 11 | Status: SHIPPED | OUTPATIENT
Start: 2024-10-14

## 2024-10-15 DIAGNOSIS — K21.00 GASTROESOPHAGEAL REFLUX DISEASE WITH ESOPHAGITIS WITHOUT HEMORRHAGE: Primary | ICD-10-CM

## 2024-10-15 RX ORDER — VONOPRAZAN FUMARATE 13.36 MG/1
10 TABLET ORAL DAILY
Qty: 90 TABLET | Refills: 0 | Status: SHIPPED | OUTPATIENT
Start: 2024-10-15

## 2024-10-21 ENCOUNTER — OFFICE VISIT (OUTPATIENT)
Dept: ORTHOPEDIC SURGERY | Facility: CLINIC | Age: 59
End: 2024-10-21
Payer: MEDICARE

## 2024-10-21 VITALS — WEIGHT: 190.1 LBS | TEMPERATURE: 97.8 F | BODY MASS INDEX: 37.32 KG/M2 | HEIGHT: 60 IN

## 2024-10-21 DIAGNOSIS — M54.2 NECK PAIN: Primary | ICD-10-CM

## 2024-10-21 PROCEDURE — 99213 OFFICE O/P EST LOW 20 MIN: CPT | Performed by: NURSE PRACTITIONER

## 2024-10-21 PROCEDURE — 1159F MED LIST DOCD IN RCRD: CPT | Performed by: NURSE PRACTITIONER

## 2024-10-21 PROCEDURE — 1160F RVW MEDS BY RX/DR IN RCRD: CPT | Performed by: NURSE PRACTITIONER

## 2024-10-21 RX ORDER — SENNOSIDES 8.6 MG
650 CAPSULE ORAL EVERY 8 HOURS PRN
COMMUNITY

## 2024-10-21 NOTE — PROGRESS NOTES
Patient Name: Aundrea Mcgarry   YOB: 1965  Referring Primary Care Physician: Spenser Hodgson MD      Chief Complaint:    Chief Complaint   Patient presents with    Cervical Spine - Follow-up, Pain            HPI:  Aundrea Mcgarry is a 58 y.o. female who presents to [facility] for follow-up of cervical MRI.  Since last visit, she has been completing physical therapy.  She does report some relief with physical therapy.  Also had bilateral shoulder injections with RALEIGH Rivera.  She is accompanied by her  today.    PFSH:  See attached    ROS: As per HPI, otherwise negative    Objective:      58 y.o. female  Body mass index is 37.13 kg/m²., 86.2 kg (190 lb 1.6 oz), @HT@  Vitals:    10/21/24 1058   Temp: 97.8 °F (36.6 °C)     Pain Score    10/21/24 1058   PainSc:   4   PainLoc: Neck            Spine Musculoskeletal Exam    Gait    Gait is normal.    General      Constitutional: well-developed and well-nourished    Scleral icterus: no    Labored breathing: no    Psychiatric: normal mood and affect and no acute distress    Neurological: alert and oriented x3        IMAGING:       No imaging in office today.  Cervical MRI 10/14/2024 was reviewed with the patient reveals loss of normal lordosis with grade 1 ectasis C3 on C4, uncovertebral joint hypertrophy of her cervical spine, disc bulges C2-3, C3-4, C5-6 and C6-7 without high-grade central or foraminal narrowing, moderate right foraminal narrowing C3-4, no myelomalacia.  Report    Assessment:           Diagnoses and all orders for this visit:    1. Neck pain (Primary)  -     Ambulatory Referral to Physical Therapy for Evaluation & Treatment             Plan:  The cervical MRI shows typical age-related changes without severe central or foraminal narrowing.  Worst foraminal narrowing to the right at C3-4, but her symptoms are worse on the left.  She has been going through physical therapy for the neck and  the shoulders.  Would advise therapy to try dry needling as she is extraordinarily tight in the traps as well as mild massage and ultrasound.  She does have history of fibromyalgia which needs to be taken into consideration obviously could be playing a role in her pain complaints.  For now, since there are no significant findings in the cervical spine, there are no surgical indication so we will follow-up as needed.  Next level of treatment could be a trial of injections, but at this point would exhaust other conservative measures especially with treatment of the shoulders before undergoing the risk of epidurals with a fairly benign MRI finding.    Return if symptoms worsen or fail to improve.    EMR Dragon/Transcription Disclaimer:   Much of this encounter note is an electronic transcription/translation of spoken language to printed text. The electronic translation of spoken language may permit erroneous, or at times, nonsensical words or phrases to be inadvertently transcribed; Although I have reviewed the note for such errors, some may still exist.  Red flags have been discussed at this or previous visits to include but not limited weakness in extremities, worsening pain that does not respond to conservative treatment and bowel or bladder dysfunction. These are reasons to present to ER and patient has been informed.    The diagnosis(es), natural history, pathophysiology and treatment for diagnosis(es) were discussed. Opportunity given and questions answered. Biomechanics of pertinent body areas discussed.    EXERCISES:  Advice on benefits of, and types of regular/moderate exercise pertaining to diagnosis.  Continue HEP. For back or neck pain, recommend pilates and or yoga as tolerated. Generally it is best to start any new exercise under the guidance of a  or therapist.   MEDICATIONS:  When prescribe, the risks, benefits, warnings,side effects and alternatives of medications discussed. Advised against long  term use of narcotics.   PAIN CONTROL:  Cold, heat, OTC lidocaine patches and/or ointment as needed. Avoid direct skin contact with ice. Ice 15-20 minutes 3-4 times daily as needed. For SI joint pain, recommend ice bath in water about 50 degrees for 5 consecutive days, add ice slowly to help with adjustment and may cover with warm towel or robe to help with cold tolerance. If using lidocaine, do not apply heat in conjunction as this can cause a burn.   MEDICAL RECORDS reviewed from other provider(s) for past and current medical history pertinent to this visit..

## 2024-10-27 DIAGNOSIS — E03.9 HYPOTHYROIDISM, UNSPECIFIED TYPE: ICD-10-CM

## 2024-10-29 NOTE — TELEPHONE ENCOUNTER
Patient comment: Out of this med. Label indicates 1 refill remaining,  but pharmacy doesn't reflect that refill.     Thyroid Hormones Protocol [id number]/27/2024 02:58 PM   Protocol Details Normal TSH in past 12 month        Rx Refill Note  Requested Prescriptions     Pending Prescriptions Disp Refills    levothyroxine (SYNTHROID, LEVOTHROID) 125 MCG tablet 90 tablet 1     Sig: Take 1 tablet by mouth Daily.      Last office visit with prescribing clinician: 9/9/2024   Last telemedicine visit with prescribing clinician: Visit date not found   Next office visit with prescribing clinician: 12/30/2024                         Would you like a call back once the refill request has been completed: [] Yes [] No    If the office needs to give you a call back, can they leave a voicemail: [] Yes [] No    Jomar Magaña MA  10/29/24, 14:35 EDT

## 2024-10-30 RX ORDER — LEVOTHYROXINE SODIUM 125 UG/1
125 TABLET ORAL DAILY
Qty: 90 TABLET | Refills: 1 | Status: SHIPPED | OUTPATIENT
Start: 2024-10-30

## 2024-12-13 NOTE — TELEPHONE ENCOUNTER
Rx Refill Note  Requested Prescriptions     Pending Prescriptions Disp Refills    traZODone (DESYREL) 100 MG tablet 90 tablet 1     Sig: Take 1 tablet by mouth Every Night.      Last office visit with prescribing clinician: 9/9/2024   Last telemedicine visit with prescribing clinician: Visit date not found   Next office visit with prescribing clinician: 12/30/2024                         Would you like a call back once the refill request has been completed: [] Yes [] No    If the office needs to give you a call back, can they leave a voicemail: [] Yes [] No    Jomar Magaña MA  12/13/24, 08:41 EST

## 2024-12-16 RX ORDER — TRAZODONE HYDROCHLORIDE 100 MG/1
100 TABLET ORAL NIGHTLY
Qty: 90 TABLET | Refills: 1 | Status: SHIPPED | OUTPATIENT
Start: 2024-12-16

## 2024-12-16 NOTE — TELEPHONE ENCOUNTER
Rx Refill Note  Requested Prescriptions     Pending Prescriptions Disp Refills    traZODone (DESYREL) 100 MG tablet 90 tablet 1     Sig: Take 1 tablet by mouth Every Night.      Last office visit with prescribing clinician: 9/9/2024   Last telemedicine visit with prescribing clinician: Visit date not found   Next office visit with prescribing clinician: 12/30/2024                         Would you like a call back once the refill request has been completed: [] Yes [] No    If the office needs to give you a call back, can they leave a voicemail: [] Yes [] No    Jomar Magaña MA  12/16/24, 10:24 EST

## 2024-12-17 ENCOUNTER — LAB (OUTPATIENT)
Dept: LAB | Facility: HOSPITAL | Age: 59
End: 2024-12-17
Payer: MEDICARE

## 2024-12-17 PROCEDURE — 84443 ASSAY THYROID STIM HORMONE: CPT | Performed by: FAMILY MEDICINE

## 2024-12-17 PROCEDURE — 83036 HEMOGLOBIN GLYCOSYLATED A1C: CPT | Performed by: FAMILY MEDICINE

## 2024-12-17 PROCEDURE — 80061 LIPID PANEL: CPT | Performed by: FAMILY MEDICINE

## 2024-12-17 PROCEDURE — 82607 VITAMIN B-12: CPT | Performed by: FAMILY MEDICINE

## 2024-12-17 PROCEDURE — 85025 COMPLETE CBC W/AUTO DIFF WBC: CPT | Performed by: FAMILY MEDICINE

## 2024-12-17 PROCEDURE — 84439 ASSAY OF FREE THYROXINE: CPT | Performed by: FAMILY MEDICINE

## 2024-12-17 PROCEDURE — 80053 COMPREHEN METABOLIC PANEL: CPT | Performed by: FAMILY MEDICINE

## 2024-12-17 PROCEDURE — 82306 VITAMIN D 25 HYDROXY: CPT | Performed by: FAMILY MEDICINE

## 2024-12-31 ENCOUNTER — CLINICAL SUPPORT (OUTPATIENT)
Dept: ORTHOPEDIC SURGERY | Facility: CLINIC | Age: 59
End: 2024-12-31
Payer: MEDICARE

## 2024-12-31 VITALS — HEIGHT: 60 IN | TEMPERATURE: 98 F | BODY MASS INDEX: 38.91 KG/M2 | WEIGHT: 198.2 LBS

## 2024-12-31 DIAGNOSIS — M25.511 CHRONIC PAIN OF BOTH SHOULDERS: ICD-10-CM

## 2024-12-31 DIAGNOSIS — M67.911 DYSFUNCTION OF ROTATOR CUFF OF BOTH SHOULDERS: Primary | ICD-10-CM

## 2024-12-31 DIAGNOSIS — R52 PAIN: ICD-10-CM

## 2024-12-31 DIAGNOSIS — G89.29 CHRONIC PAIN OF BOTH SHOULDERS: ICD-10-CM

## 2024-12-31 DIAGNOSIS — M67.912 DYSFUNCTION OF ROTATOR CUFF OF BOTH SHOULDERS: Primary | ICD-10-CM

## 2024-12-31 DIAGNOSIS — M25.512 CHRONIC PAIN OF BOTH SHOULDERS: ICD-10-CM

## 2024-12-31 RX ADMIN — METHYLPREDNISOLONE ACETATE 80 MG: 80 INJECTION, SUSPENSION INTRA-ARTICULAR; INTRALESIONAL; INTRAMUSCULAR; SOFT TISSUE at 10:21

## 2024-12-31 RX ADMIN — LIDOCAINE HYDROCHLORIDE 2 ML: 10 INJECTION, SOLUTION EPIDURAL; INFILTRATION; INTRACAUDAL; PERINEURAL at 10:21

## 2025-01-02 ENCOUNTER — OFFICE VISIT (OUTPATIENT)
Dept: GASTROENTEROLOGY | Facility: CLINIC | Age: 60
End: 2025-01-02
Payer: MEDICARE

## 2025-01-02 VITALS
WEIGHT: 194.4 LBS | BODY MASS INDEX: 38.17 KG/M2 | SYSTOLIC BLOOD PRESSURE: 130 MMHG | DIASTOLIC BLOOD PRESSURE: 82 MMHG | HEIGHT: 60 IN

## 2025-01-02 DIAGNOSIS — K59.04 CHRONIC IDIOPATHIC CONSTIPATION: Primary | ICD-10-CM

## 2025-01-02 DIAGNOSIS — Z86.0100 HISTORY OF COLON POLYPS: ICD-10-CM

## 2025-01-02 DIAGNOSIS — K21.00 GASTROESOPHAGEAL REFLUX DISEASE WITH ESOPHAGITIS WITHOUT HEMORRHAGE: ICD-10-CM

## 2025-01-02 DIAGNOSIS — K22.4 ESOPHAGEAL SPASM: ICD-10-CM

## 2025-01-02 PROCEDURE — 1159F MED LIST DOCD IN RCRD: CPT | Performed by: INTERNAL MEDICINE

## 2025-01-02 PROCEDURE — 99213 OFFICE O/P EST LOW 20 MIN: CPT | Performed by: INTERNAL MEDICINE

## 2025-01-02 PROCEDURE — 1160F RVW MEDS BY RX/DR IN RCRD: CPT | Performed by: INTERNAL MEDICINE

## 2025-01-02 NOTE — PROGRESS NOTES
PATIENT INFORMATION  Aundrea Mcgarry       - 1965    CHIEF COMPLAINT  Chief Complaint   Patient presents with    Heartburn    Esophageal spasm    Chronic idiopathic constipation       HISTORY OF PRESENT ILLNESS  Didn't really tolerate even nightly ditiazem ( most of her symptoms are at night ) but she developed a diarrhea  forn daily meds so takes as needed and is doing well     Overal lis doing wel except foor her shoulder pain issues        REVIEWED PERTINENT RESULTS/ LABS  Lab Results   Component Value Date    CASEREPORT  2023     Surgical Pathology Report                         Case: [id number]                                  Authorizing Provider:  Oleksandr Lorenzo        Collected:           2023 12:09 PM                                 MD Westley                                                                   Ordering Location:     [facility]   Received:            2023 04:37 PM                                 OR                                                                           Pathologist:           Darcy Zamora MD                                                          Specimens:   1) - Stomach                                                                                        2) - Esophagus, Distal                                                                              3) - Large Intestine, Right / Ascending Colon, ascending colon polyp                                4) - Large Intestine, Transverse Colon, transverse colon polyp                             FINALDX  2023     1. Stomach, Biopsy:   A. Gastric antral and oxyntic mucosa with chronic inactive gastritis.   B. Negative for intestinal metaplasia.   C. H. pylori immunostain is pending with those results to follow as an addendum.    2. Esophagus, Distal, Biopsy:    A. Squamocolumnar mucosa with basal layer hyperplasia, increased intraepithelial lymphocytes and        eosinophils (up to 1/HPF), consistent with reflux esophagitis.   B. Negative for intestinal metaplasia.     3. Ascending Colon, Polyp, Polypectomy:   A. Fragments of tubular adenoma.     4. Transverse Colon, Polyp, Polypectomy:   A. Tubular adenoma.    juventino/rustam        Lab Results   Component Value Date    HGB 14.1 12/17/2024    MCV 97.1 (H) 12/17/2024     12/17/2024    ALT 20 12/17/2024    AST 21 12/17/2024    HGBA1C 4.90 12/17/2024    INR 1.0 09/13/2022    TRIG 128 12/17/2024    FERRITIN 142.00 03/08/2021      XR Shoulder 2+ View Right    Result Date: 12/31/2024  Impression: Ordering physician's impression is located in the Encounter Note dated 12/31/24. X-ray performed in the DR room.      REVIEW OF SYSTEMS  Review of Systems   Constitutional:  Negative for activity change, chills, fever and unexpected weight change.   HENT:  Positive for trouble swallowing. Negative for congestion.    Eyes:  Negative for visual disturbance.   Respiratory:  Positive for cough. Negative for shortness of breath.    Cardiovascular:  Negative for chest pain and palpitations.   Gastrointestinal:  Positive for abdominal distention, abdominal pain and diarrhea. Negative for blood in stool.        Reflux   Endocrine: Negative for cold intolerance and heat intolerance.   Genitourinary:  Negative for hematuria.   Musculoskeletal:  Negative for gait problem.   Skin:  Negative for color change.   Allergic/Immunologic: Negative for immunocompromised state.   Neurological:  Negative for weakness and light-headedness.   Hematological:  Negative for adenopathy.   Psychiatric/Behavioral:  Negative for sleep disturbance. The patient is not nervous/anxious.          ACTIVE PROBLEMS  Patient Active Problem List    Diagnosis     Chronic idiopathic constipation [K59.04]     ANNA on CPAP [G47.33]     History of colon polyps [Z86.0100]     Myalgia [M79.10]     Carpal tunnel syndrome on both sides [G56.03]     Umbilical hernia with obstruction,  without gangrene [K42.0]     Pulmonary nodule, right [R91.1]     OAB (overactive bladder) [N32.81]     Hiatal hernia [K44.9]     Osteopenia of both thighs [M85.851, M85.852]     Dysphagia [R13.10]     Depression with anxiety [F41.8]     Routine health maintenance [Z00.00]     Bursitis/tendonitis, shoulder [XQT7172]     Chronic left shoulder pain [M25.512, G89.29]     Arthritis [M19.90]     Bunion [M21.619]     Gastroesophageal reflux disease with esophagitis [K21.00]     Esophageal spasm [K22.4]     Ventricular premature beats [I49.3]     Recurrent herpes labialis [B00.1]     Hypothyroidism [E03.9]          PAST MEDICAL HISTORY  Past Medical History:   Diagnosis Date    Allergic     Erythromycin    Anxiety and depression     Arthritis     Arthritis of back     Arthritis of neck     Broken jaw     1995      Cervical disc disorder 2024    Cholelithiasis 2017    Colon polyp 2023    CTS (carpal tunnel syndrome)     Diverticulosis     Esophageal spasm     Esophageal varices     Spasms    Fibromyalgia     Fibromyalgia, primary     Frozen shoulder     GERD (gastroesophageal reflux disease)     Headache 2019    Hip arthrosis     Hypertension     Hypothyroidism     IBS (irritable bowel syndrome)     Lactose intolerance     Low back strain     Nausea     chronic    Overactive bladder     Periarthritis of shoulder     Pneumonia     PONV (postoperative nausea and vomiting)     PVC (premature ventricular contraction)     Rheumatoid arthritis     Rotator cuff syndrome     Stroke     Umbilical hernia          SURGICAL HISTORY  Past Surgical History:   Procedure Laterality Date    ABDOMINAL SURGERY      BREAST BIOPSY Right early 90's    BUNIONECTOMY Right 09/30/2022    CARPAL TUNNEL RELEASE  11/2021    Dr. Das    CHOLECYSTECTOMY  2017    CHOLECYSTECTOMY WITH INTRAOPERATIVE CHOLANGIOGRAM N/A 06/30/2017    Procedure: CHOLECYSTECTOMY LAPAROSCOPIC INTRAOPERATIVE CHOLANGIOGRAM;  Surgeon: David Kirkland MD;  Location: [facility] OR  [facility];  Service:     COLONOSCOPY      COLONOSCOPY  2013?    DR BRAR    COLONOSCOPY W/ POLYPECTOMY N/A 06/30/2023    Procedure: COLONOSCOPY WITH POLYPECTOMY;  Surgeon: Oleksandr Lorenzo MD;  Location: [facility] OR;  Service: Gastroenterology;  Laterality: N/A;  ascending colon polyp (cold snare)  transverse colon polyp    ENDOSCOPY N/A 12/01/2017    Procedure: ESOPHAGOGASTRODUODENOSCOPY with esophageal dilitation to 20mm,, and tissue biopsies/ polypectomies esophagus and gastric;  Surgeon: Meliza Pope MD;  Location: [facility] OR;  Service:     ENDOSCOPY N/A 06/30/2023    Procedure: ESOPHAGOGASTRODUODENOSCOPY WITH BIOPSY;  Surgeon: Oleksandr Lorenzo MD;  Location: [facility] OR;  Service: Gastroenterology;  Laterality: N/A;  gastritis-gastric biopsy  esophagitis- distal esophagus biopsy    ESOPHAGOSCOPY  06/30/2023    Procedure: ESOPHAGOSCOPY WITH DILATATION;  Surgeon: Oleksandr Lorenzo MD;  Location: [facility] OR;  Service: Gastroenterology;;  dilate to 18mm    EYE SURGERY Bilateral     LENS REPLACEMENT    FLEXIBLE SIGMOIDOSCOPY      HAND SURGERY      HIP SURGERY Left     HYSTERECTOMY      HYSTERECTOMY  2002    INGUINAL HERNIA REPAIR Left     as a child    LASER ABLATION N/A 2002    MANDIBLE FRACTURE SURGERY      SUBTOTAL HYSTERECTOMY  2002    TONSILLECTOMY      TRIGGER POINT INJECTION      TUBAL ABDOMINAL LIGATION  1989    UMBILICAL HERNIA REPAIR N/A 07/31/2020    Procedure: INCISIONAL HERNIA REPAIR WITH MESH;  Surgeon: David Kirkland MD;  Location:  [facility];  Service: General;  Laterality: N/A;    UPPER GASTROINTESTINAL ENDOSCOPY N/A 07/20/2011    Normal upper endosopy - Dr. Meliza Pope    WRIST SURGERY           FAMILY HISTORY  Family History   Problem Relation Age of Onset    Thyroid disease Mother     Hypertension Mother     Arthritis Mother     Hearing loss Mother     Osteoporosis Mother     Diabetes Father     Hypertension Father     Heart disease Father     Hyperlipidemia Father      Kidney disease Father     Breast cancer Maternal Aunt     Cancer Maternal Aunt         Breast    Breast cancer Paternal Aunt     Heart disease Brother     Diabetes Brother     Anxiety disorder Daughter     Depression Daughter     Mental illness Daughter     Mental illness Daughter     Thyroid disease Daughter     Cancer Maternal Grandfather         lung -     Thyroid disease Maternal Grandmother     Rheumatologic disease Maternal Grandmother     Cancer Maternal Aunt         breast - survived    Cancer Paternal Aunt         breast -     Diabetes Brother     Crohn's disease Maternal Aunt     Malig Hyperthermia Neg Hx          SOCIAL HISTORY  Social History     Occupational History    Not on file   Tobacco Use    Smoking status: Former     Current packs/day: 0.00     Average packs/day: 0.3 packs/day for 3.0 years (0.8 ttl pk-yrs)     Types: Cigarettes     Start date: 2001     Quit date: 2004     Years since quittin.5     Passive exposure: Past    Smokeless tobacco: Never   Vaping Use    Vaping status: Never Used   Substance and Sexual Activity    Alcohol use: Yes     Alcohol/week: 6.0 standard drinks of alcohol     Types: 5 Glasses of wine, 1 Cans of beer per week     Comment: weekly    Drug use: Never    Sexual activity: Defer         CURRENT MEDICATIONS    Current Outpatient Medications:     acetaminophen (TYLENOL) 650 MG 8 hr tablet, Take 1 tablet by mouth Every 8 (Eight) Hours As Needed for Mild Pain., Disp: , Rfl:     acyclovir (ZOVIRAX) 400 MG tablet, Take 1 tablet by mouth 5 (Five) Times a Day., Disp: 30 tablet, Rfl: 4    aluminum hydroxide-mag carbonate (GAVISCON EXTRA RELIEF) 160-105 MG chewable tablet chewable tablet, Chew 1-2 tablets As Needed., Disp: , Rfl:     buPROPion XL (WELLBUTRIN XL) 300 MG 24 hr tablet, Take 1 tablet by mouth Every Morning., Disp: 90 tablet, Rfl: 1    Collagen-Vitamin C-Biotin (Collagen 1500/C) 500-50-0.8 MG capsule, , Disp: , Rfl:      "cyclobenzaprine (FLEXERIL) 5 MG tablet, Take 1-2 tablets by mouth 3 (Three) Times a Day As Needed for Muscle Spasms., Disp: 45 tablet, Rfl: 1    dilTIAZem (Cardizem) 30 MG tablet, Take 1 tablet by mouth 3 (Three) Times a Day. (Patient taking differently: Take 1 tablet by mouth As Needed.), Disp: 90 tablet, Rfl: 11    escitalopram (LEXAPRO) 20 MG tablet, Take 1 tablet by mouth Daily., Disp: 90 tablet, Rfl: 1    famotidine (PEPCID) 40 MG tablet, TAKE 1 TABLET BY MOUTH TWICE A DAY WITH LUNCH AND DINNER (Patient taking differently: As Needed.), Disp: 180 tablet, Rfl: 0    gabapentin (NEURONTIN) 300 MG capsule, TAKE 1 CAPSULE BY MOUTH 2 TIMES A DAY, Disp: 60 capsule, Rfl: 5    levothyroxine (SYNTHROID, LEVOTHROID) 125 MCG tablet, Take 1 tablet by mouth Daily., Disp: 90 tablet, Rfl: 1    metoprolol tartrate (LOPRESSOR) 25 MG tablet, Take 1 tablet by mouth 2 (Two) Times a Day., Disp: 180 tablet, Rfl: 1    MULTIPLE VITAMIN PO, Take 1 tablet by mouth Daily., Disp: , Rfl:     oxybutynin XL (DITROPAN-XL) 5 MG 24 hr tablet, Take 1 tablet by mouth Daily., Disp: 90 tablet, Rfl: 1    traZODone (DESYREL) 100 MG tablet, Take 1 tablet by mouth Every Night., Disp: 90 tablet, Rfl: 1    vitamin B-12 (CYANOCOBALAMIN) 100 MCG tablet, Take 0.5 tablets by mouth Daily., Disp: , Rfl:     Vonoprazan Fumarate (Voquezna) 10 MG tablet, Take 1 tablet by mouth Daily., Disp: 90 tablet, Rfl: 0    ALLERGIES  Erythromycin, Lactose intolerance (gi), and Adhesive tape    VITALS  Vitals:    01/02/25 0937   BP: 130/82   BP Location: Left arm   Patient Position: Sitting   Cuff Size: Adult   Weight: 88.2 kg (194 lb 6.4 oz)   Height: 152.4 cm (60\")       PHYSICAL EXAM  Debilities/Disabilities Identified: None  Emotional Behavior: Appropriate  Wt Readings from Last 3 Encounters:   01/02/25 88.2 kg (194 lb 6.4 oz)   12/31/24 89.9 kg (198 lb 3.2 oz)   10/21/24 86.2 kg (190 lb 1.6 oz)     Ht Readings from Last 1 Encounters:   01/02/25 152.4 cm (60\")     Body mass " index is 37.97 kg/m².  Physical Exam  Constitutional:       Appearance: She is well-developed. She is not diaphoretic.   HENT:      Head: Normocephalic and atraumatic.   Eyes:      General: No scleral icterus.     Conjunctiva/sclera: Conjunctivae normal.      Pupils: Pupils are equal, round, and reactive to light.   Neck:      Thyroid: No thyromegaly.   Cardiovascular:      Rate and Rhythm: Normal rate and regular rhythm.      Heart sounds: Normal heart sounds. No murmur heard.     No gallop.   Pulmonary:      Effort: Pulmonary effort is normal.      Breath sounds: Normal breath sounds. No wheezing or rales.   Abdominal:      General: Bowel sounds are normal. There is no distension or abdominal bruit.      Palpations: Abdomen is soft. There is no shifting dullness, fluid wave or mass.      Tenderness: There is no abdominal tenderness. There is no guarding. Negative signs include Castaneda's sign.      Hernia: There is no hernia in the ventral area.   Musculoskeletal:         General: Normal range of motion.      Cervical back: Normal range of motion and neck supple.   Lymphadenopathy:      Cervical: No cervical adenopathy.   Skin:     General: Skin is warm and dry.      Findings: No erythema or rash.   Neurological:      Mental Status: She is alert and oriented to person, place, and time.   Psychiatric:         Mood and Affect: Mood normal.         Behavior: Behavior normal.         CLINICAL DATA REVIEWED   reviewed previous lab results and integrated with today's visit, reviewed notes from other physicians and/or last GI encounter, reviewed previous endoscopy results and available photos, reviewed surgical pathology results from previous biopsies    ASSESSMENT  Diagnoses and all orders for this visit:    Chronic idiopathic constipation    Esophageal spasm    Gastroesophageal reflux disease with esophagitis without hemorrhage    History of colon polyps          PLAN  No change for now  will continue Nick  Famotidine and PRN Cardizem     Return in about 1 year (around 1/2/2026).    I have discussed the above plan with the patient.  They verbalize understanding and are in agreement with the plan.  They have been advised to contact the office for any questions, concerns, or changes related to their health.

## 2025-01-03 DIAGNOSIS — K21.00 GASTROESOPHAGEAL REFLUX DISEASE WITH ESOPHAGITIS WITHOUT HEMORRHAGE: Primary | ICD-10-CM

## 2025-01-03 RX ORDER — METHYLPREDNISOLONE ACETATE 80 MG/ML
80 INJECTION, SUSPENSION INTRA-ARTICULAR; INTRALESIONAL; INTRAMUSCULAR; SOFT TISSUE
Status: COMPLETED | OUTPATIENT
Start: 2024-12-31 | End: 2024-12-31

## 2025-01-03 RX ORDER — FAMOTIDINE 40 MG/1
TABLET, FILM COATED ORAL
Qty: 180 TABLET | Refills: 3 | Status: SHIPPED | OUTPATIENT
Start: 2025-01-03

## 2025-01-03 RX ORDER — LIDOCAINE HYDROCHLORIDE 10 MG/ML
2 INJECTION, SOLUTION EPIDURAL; INFILTRATION; INTRACAUDAL; PERINEURAL
Status: COMPLETED | OUTPATIENT
Start: 2024-12-31 | End: 2024-12-31

## 2025-01-05 DIAGNOSIS — K21.00 GASTROESOPHAGEAL REFLUX DISEASE WITH ESOPHAGITIS WITHOUT HEMORRHAGE: ICD-10-CM

## 2025-01-07 RX ORDER — FAMOTIDINE 40 MG/1
TABLET, FILM COATED ORAL
Qty: 180 TABLET | Refills: 3 | Status: SHIPPED | OUTPATIENT
Start: 2025-01-07

## 2025-01-08 ENCOUNTER — OFFICE VISIT (OUTPATIENT)
Dept: INTERNAL MEDICINE | Facility: CLINIC | Age: 60
End: 2025-01-08
Payer: MEDICARE

## 2025-01-08 VITALS
BODY MASS INDEX: 37.89 KG/M2 | HEIGHT: 60 IN | OXYGEN SATURATION: 96 % | HEART RATE: 54 BPM | TEMPERATURE: 97.1 F | WEIGHT: 193 LBS | DIASTOLIC BLOOD PRESSURE: 80 MMHG | SYSTOLIC BLOOD PRESSURE: 122 MMHG

## 2025-01-08 DIAGNOSIS — E66.01 CLASS 2 SEVERE OBESITY WITH SERIOUS COMORBIDITY AND BODY MASS INDEX (BMI) OF 37.0 TO 37.9 IN ADULT, UNSPECIFIED OBESITY TYPE: ICD-10-CM

## 2025-01-08 DIAGNOSIS — G47.33 OSA ON CPAP: ICD-10-CM

## 2025-01-08 DIAGNOSIS — E03.9 HYPOTHYROIDISM, UNSPECIFIED TYPE: ICD-10-CM

## 2025-01-08 DIAGNOSIS — B00.1 RECURRENT HERPES LABIALIS: ICD-10-CM

## 2025-01-08 DIAGNOSIS — K21.00 GASTROESOPHAGEAL REFLUX DISEASE WITH ESOPHAGITIS WITHOUT HEMORRHAGE: ICD-10-CM

## 2025-01-08 DIAGNOSIS — E55.9 HYPOVITAMINOSIS D: ICD-10-CM

## 2025-01-08 DIAGNOSIS — I49.3 VENTRICULAR PREMATURE BEATS: ICD-10-CM

## 2025-01-08 DIAGNOSIS — M79.10 MYALGIA: ICD-10-CM

## 2025-01-08 DIAGNOSIS — N32.81 OAB (OVERACTIVE BLADDER): ICD-10-CM

## 2025-01-08 DIAGNOSIS — K22.4 ESOPHAGEAL SPASM: ICD-10-CM

## 2025-01-08 DIAGNOSIS — Z00.00 ROUTINE HEALTH MAINTENANCE: ICD-10-CM

## 2025-01-08 DIAGNOSIS — Z86.0100 HISTORY OF COLON POLYPS: ICD-10-CM

## 2025-01-08 DIAGNOSIS — F41.8 DEPRESSION WITH ANXIETY: Primary | ICD-10-CM

## 2025-01-08 DIAGNOSIS — Z12.31 ENCOUNTER FOR SCREENING MAMMOGRAM FOR MALIGNANT NEOPLASM OF BREAST: ICD-10-CM

## 2025-01-08 DIAGNOSIS — E66.812 CLASS 2 SEVERE OBESITY WITH SERIOUS COMORBIDITY AND BODY MASS INDEX (BMI) OF 37.0 TO 37.9 IN ADULT, UNSPECIFIED OBESITY TYPE: ICD-10-CM

## 2025-01-08 RX ORDER — GABAPENTIN 400 MG/1
400 CAPSULE ORAL 3 TIMES DAILY
Qty: 360 CAPSULE | Refills: 1 | Status: SHIPPED | OUTPATIENT
Start: 2025-01-08

## 2025-01-08 RX ORDER — BUPROPION HYDROCHLORIDE 300 MG/1
300 TABLET ORAL EVERY MORNING
Qty: 90 TABLET | Refills: 1 | Status: SHIPPED | OUTPATIENT
Start: 2025-01-08

## 2025-01-08 RX ORDER — OXYBUTYNIN CHLORIDE 5 MG/1
5 TABLET, EXTENDED RELEASE ORAL DAILY
Qty: 90 TABLET | Refills: 1 | Status: SHIPPED | OUTPATIENT
Start: 2025-01-08

## 2025-01-08 RX ORDER — LEVOTHYROXINE SODIUM 125 UG/1
125 TABLET ORAL DAILY
Qty: 90 TABLET | Refills: 1 | Status: SHIPPED | OUTPATIENT
Start: 2025-01-08

## 2025-01-08 RX ORDER — TRAZODONE HYDROCHLORIDE 100 MG/1
100 TABLET ORAL NIGHTLY
Qty: 90 TABLET | Refills: 1 | Status: SHIPPED | OUTPATIENT
Start: 2025-01-08

## 2025-01-08 RX ORDER — ESCITALOPRAM OXALATE 20 MG/1
20 TABLET ORAL DAILY
Qty: 90 TABLET | Refills: 1 | Status: SHIPPED | OUTPATIENT
Start: 2025-01-08

## 2025-01-08 RX ORDER — METOPROLOL TARTRATE 25 MG/1
25 TABLET, FILM COATED ORAL 2 TIMES DAILY
Qty: 180 TABLET | Refills: 1 | Status: SHIPPED | OUTPATIENT
Start: 2025-01-08

## 2025-01-08 NOTE — PROGRESS NOTES
"Subjective   Aundrea Mcgarry is a 59 y.o. female presenting today for follow up of   Chief Complaint   Patient presents with    Anxiety     F/u    Shoulder Pain     Still having issues with both shoulders, seeing ortho, has MRI scheduled later this month.     Fibromyalgia     Feels symptoms have worsened, discuss increasing gabapentin         Wellness visit  Symptoms are: chronic.   Onset was more than 5 years.   Symptoms occur: constantly.  Symptoms include: joint pain, change in stool, headaches, joint swelling, myalgias and neck pain.   Pertinent negative symptoms include no cough, no diaphoresis, no fever, no nausea, no numbness, no rash, no sore throat, no swollen glands, no dysuria, no vertigo, no visual change and no vomiting.      1. Depression with anxiety.  Pt reports she is feeling well.  Tolerating bupropion and escitalopram.  Denies SI.    2. Myalgia.  Pt reports the gabapentin is still effective in controlling her pain; she is requesting a higher dose due to increased \"nerve pain\" in her shoulders.  She is tolerating it well and functioning better in her daily activities.    3. Hypothyroidism.  Pt is compliant with daily replacement.    Patient Active Problem List   Diagnosis    Arthritis    Bunion    Gastroesophageal reflux disease with esophagitis    Esophageal spasm    Ventricular premature beats    Recurrent herpes labialis    Hypothyroidism    Chronic left shoulder pain    Bursitis/tendonitis, shoulder    Routine health maintenance    Depression with anxiety    Dysphagia    Osteopenia of both thighs    Hiatal hernia    OAB (overactive bladder)    Pulmonary nodule, right    Umbilical hernia with obstruction, without gangrene    Carpal tunnel syndrome on both sides    Myalgia    History of colon polyps    ANNA on CPAP    Chronic idiopathic constipation       Current Outpatient Medications on File Prior to Visit   Medication Sig    acetaminophen (TYLENOL) 650 MG 8 hr tablet Take 1 tablet by mouth " Every 8 (Eight) Hours As Needed for Mild Pain.    acyclovir (ZOVIRAX) 400 MG tablet Take 1 tablet by mouth 5 (Five) Times a Day.    aluminum hydroxide-mag carbonate (GAVISCON EXTRA RELIEF) 160-105 MG chewable tablet chewable tablet Chew 1-2 tablets As Needed.    Collagen-Vitamin C-Biotin (Collagen 1500/C) 500-50-0.8 MG capsule     cyclobenzaprine (FLEXERIL) 5 MG tablet Take 1-2 tablets by mouth 3 (Three) Times a Day As Needed for Muscle Spasms.    dilTIAZem (Cardizem) 30 MG tablet Take 1 tablet by mouth 3 (Three) Times a Day. (Patient taking differently: Take 1 tablet by mouth As Needed.)    famotidine (PEPCID) 40 MG tablet TAKE 1 TABLET BY MOUTH 2 TIMES A DAY WITH LUNCH AND DINNER    MULTIPLE VITAMIN PO Take 1 tablet by mouth Daily.    vitamin B-12 (CYANOCOBALAMIN) 100 MCG tablet Take 0.5 tablets by mouth Daily.    Vonoprazan Fumarate (Voquezna) 10 MG tablet Take 1 tablet by mouth Daily.    [DISCONTINUED] buPROPion XL (WELLBUTRIN XL) 300 MG 24 hr tablet Take 1 tablet by mouth Every Morning.    [DISCONTINUED] escitalopram (LEXAPRO) 20 MG tablet Take 1 tablet by mouth Daily.    [DISCONTINUED] gabapentin (NEURONTIN) 300 MG capsule TAKE 1 CAPSULE BY MOUTH 2 TIMES A DAY    [DISCONTINUED] levothyroxine (SYNTHROID, LEVOTHROID) 125 MCG tablet Take 1 tablet by mouth Daily.    [DISCONTINUED] metoprolol tartrate (LOPRESSOR) 25 MG tablet Take 1 tablet by mouth 2 (Two) Times a Day.    [DISCONTINUED] oxybutynin XL (DITROPAN-XL) 5 MG 24 hr tablet Take 1 tablet by mouth Daily.    [DISCONTINUED] traZODone (DESYREL) 100 MG tablet Take 1 tablet by mouth Every Night.     No current facility-administered medications on file prior to visit.          The following portions of the patient's history were reviewed and updated as appropriate: allergies, current medications, past family history, past medical history, past social history, past surgical history and problem list.    Review of Systems   Constitutional: Negative.  Negative for  "diaphoresis and fever.   HENT:  Negative for sore throat and swollen glands.    Respiratory: Negative.  Negative for cough.    Cardiovascular: Negative.    Gastrointestinal:  Negative for nausea and vomiting.   Genitourinary:  Negative for dysuria.   Musculoskeletal:  Positive for joint pain, myalgias and neck pain.   Skin:  Negative for rash.   Neurological:  Negative for vertigo and numbness.   Psychiatric/Behavioral: Negative.         Objective   Vitals:    01/08/25 0926   BP: 122/80   BP Location: Left arm   Patient Position: Sitting   Cuff Size: Adult   Pulse: 54   Temp: 97.1 °F (36.2 °C)   TempSrc: Infrared   SpO2: 96%   Weight: 87.5 kg (193 lb)   Height: 152.4 cm (60\")       BP Readings from Last 3 Encounters:   01/08/25 122/80   01/02/25 130/82   10/14/24 120/80        Wt Readings from Last 3 Encounters:   01/08/25 87.5 kg (193 lb)   01/02/25 88.2 kg (194 lb 6.4 oz)   12/31/24 89.9 kg (198 lb 3.2 oz)        Body mass index is 37.69 kg/m².  Nursing notes and vitals reviewed.    Physical Exam  Vitals and nursing note reviewed.   Constitutional:       Appearance: Normal appearance. She is well-developed.   HENT:      Head: Normocephalic and atraumatic.      Mouth/Throat:      Mouth: Mucous membranes are moist.   Eyes:      Extraocular Movements: Extraocular movements intact.      Conjunctiva/sclera: Conjunctivae normal.   Neck:      Thyroid: No thyromegaly.   Cardiovascular:      Rate and Rhythm: Normal rate and regular rhythm.      Heart sounds: Normal heart sounds.   Pulmonary:      Effort: Pulmonary effort is normal.      Breath sounds: Normal breath sounds.   Abdominal:      Palpations: Abdomen is soft.      Tenderness: There is no abdominal tenderness.   Musculoskeletal:      Cervical back: Neck supple. No rigidity.      Right lower leg: No edema.      Left lower leg: No edema.   Skin:     General: Skin is warm and dry.   Neurological:      General: No focal deficit present.      Mental Status: She is " alert and oriented to person, place, and time.   Psychiatric:         Mood and Affect: Mood normal.         Behavior: Behavior normal.         Recent Results (from the past 4 weeks)   Hemoglobin A1c    Collection Time: 12/17/24  1:02 PM    Specimen: Blood   Result Value Ref Range    Hemoglobin A1C 4.90 4.80 - 5.60 %   Lipid Panel With / Chol / HDL Ratio    Collection Time: 12/17/24  1:02 PM    Specimen: Blood   Result Value Ref Range    Total Cholesterol 216 (H) 0 - 200 mg/dL    Triglycerides 128 0 - 150 mg/dL    HDL Cholesterol 50 40 - 60 mg/dL    LDL Cholesterol  143 (H) 0 - 100 mg/dL    VLDL Cholesterol 23 5 - 40 mg/dL    Chol/HDL Ratio 4.32    Vitamin B12    Collection Time: 12/17/24  1:02 PM    Specimen: Blood   Result Value Ref Range    Vitamin B-12 1,264 (H) 211 - 946 pg/mL   Vitamin D,25-Hydroxy    Collection Time: 12/17/24  1:02 PM    Specimen: Blood   Result Value Ref Range    25 Hydroxy, Vitamin D 27.8 (L) 30.0 - 100.0 ng/ml   Comprehensive Metabolic Panel    Collection Time: 12/17/24  1:03 PM    Specimen: Blood   Result Value Ref Range    Glucose 93 65 - 99 mg/dL    BUN 8 8 - 23 mg/dL    Creatinine 0.75 0.57 - 1.00 mg/dL    Sodium 138 136 - 145 mmol/L    Potassium 3.9 3.5 - 5.2 mmol/L    Chloride 104 98 - 107 mmol/L    CO2 24.1 22.0 - 29.0 mmol/L    Calcium 9.1 8.6 - 10.5 mg/dL    Total Protein 6.6 6.0 - 8.5 g/dL    Albumin 4.1 3.5 - 5.2 g/dL    ALT (SGPT) 20 1 - 33 U/L    AST (SGOT) 21 1 - 32 U/L    Alkaline Phosphatase 87 39 - 117 U/L    Total Bilirubin 0.3 0.0 - 1.2 mg/dL    Globulin 2.5 gm/dL    A/G Ratio 1.6 g/dL    BUN/Creatinine Ratio 10.7 7.0 - 25.0    Anion Gap 9.9 5.0 - 15.0 mmol/L    eGFR 91.3 >60.0 mL/min/1.73   TSH    Collection Time: 12/17/24  1:03 PM    Specimen: Blood   Result Value Ref Range    TSH 0.219 (L) 0.270 - 4.200 uIU/mL   CBC Auto Differential    Collection Time: 12/17/24  1:03 PM    Specimen: Blood   Result Value Ref Range    WBC 5.45 3.40 - 10.80 10*3/mm3    RBC 4.12 3.77 -  5.28 10*6/mm3    Hemoglobin 14.1 12.0 - 15.9 g/dL    Hematocrit 40.0 34.0 - 46.6 %    MCV 97.1 (H) 79.0 - 97.0 fL    MCH 34.2 (H) 26.6 - 33.0 pg    MCHC 35.3 31.5 - 35.7 g/dL    RDW 12.1 (L) 12.3 - 15.4 %    RDW-SD 42.9 37.0 - 54.0 fl    MPV 11.8 6.0 - 12.0 fL    Platelets 248 140 - 450 10*3/mm3    Neutrophil % 62.2 42.7 - 76.0 %    Lymphocyte % 25.1 19.6 - 45.3 %    Monocyte % 8.3 5.0 - 12.0 %    Eosinophil % 3.3 0.3 - 6.2 %    Basophil % 0.9 0.0 - 1.5 %    Immature Grans % 0.2 0.0 - 0.5 %    Neutrophils, Absolute 3.39 1.70 - 7.00 10*3/mm3    Lymphocytes, Absolute 1.37 0.70 - 3.10 10*3/mm3    Monocytes, Absolute 0.45 0.10 - 0.90 10*3/mm3    Eosinophils, Absolute 0.18 0.00 - 0.40 10*3/mm3    Basophils, Absolute 0.05 0.00 - 0.20 10*3/mm3    Immature Grans, Absolute 0.01 0.00 - 0.05 10*3/mm3    nRBC 0.0 0.0 - 0.2 /100 WBC   T4, Free    Collection Time: 12/17/24  1:03 PM    Specimen: Blood   Result Value Ref Range    Free T4 1.55 0.92 - 1.68 ng/dL         Assessment & Plan   Diagnoses and all orders for this visit:    1. Depression with anxiety (Primary)  -     gabapentin (NEURONTIN) 400 MG capsule; Take 1 capsule by mouth 3 (Three) Times a Day.  Dispense: 360 capsule; Refill: 1  -     buPROPion XL (WELLBUTRIN XL) 300 MG 24 hr tablet; Take 1 tablet by mouth Every Morning.  Dispense: 90 tablet; Refill: 1  -     escitalopram (LEXAPRO) 20 MG tablet; Take 1 tablet by mouth Daily.  Dispense: 90 tablet; Refill: 1    2. OAB (overactive bladder)  -     oxybutynin XL (DITROPAN-XL) 5 MG 24 hr tablet; Take 1 tablet by mouth Daily.  Dispense: 90 tablet; Refill: 1    3. Recurrent herpes labialis    4. Myalgia    5. Hypothyroidism, unspecified type  -     levothyroxine (SYNTHROID, LEVOTHROID) 125 MCG tablet; Take 1 tablet by mouth Daily.  Dispense: 90 tablet; Refill: 1  -     Lipid Panel With / Chol / HDL Ratio; Future  -     TSH; Future  -     CBC (No Diff); Future  -     T4, Free; Future    6. Gastroesophageal reflux disease with  esophagitis without hemorrhage    7. Ventricular premature beats  -     metoprolol tartrate (LOPRESSOR) 25 MG tablet; Take 1 tablet by mouth 2 (Two) Times a Day.  Dispense: 180 tablet; Refill: 1  -     Comprehensive Metabolic Panel; Future    8. History of colon polyps    9. ANNA on CPAP    10. Routine health maintenance    11. Esophageal spasm  -     traZODone (DESYREL) 100 MG tablet; Take 1 tablet by mouth Every Night.  Dispense: 90 tablet; Refill: 1    12. Encounter for screening mammogram for malignant neoplasm of breast  -     Mammo screening digital tomosynthesis bilateral w CAD; Future    13. Class 2 severe obesity with serious comorbidity and body mass index (BMI) of 37.0 to 37.9 in adult, unspecified obesity type  -     Ambulatory Referral to Nutrition Services    14. Hypovitaminosis D  -     Vitamin D,25-Hydroxy; Future    1. Depression with anxiety.  Doing okay with bupropion and escitalopram.     2. OAB.  Controlled with dietary measures and oxybutynin.     3. Recurrent herpes labialis.  No flare.  Continue prn acyclovir.     4. Myalgias.  Recently finished physical therapy.  Increase gabapentin to 400 mg TID.      5. Hypothyroidism.  Replaced with levothyroxine 125 mcg daily.       6. GERD, esophageal dyskinesia.  On BID lansoprazole and BID famotidine, trazodone, Voquenza.  She sees GI and had EGD 6/2023 which showed gastritis; no Kim's.      7. PVCs.  Symptoms improved with metoprolol.  Continue same.     8. History of colon polyps.  6/2023, Dr. Lorenzo.  Recall 7 years.     9. ANNA on CPAP.  She has adjusted to it and feels better with it.  Using and benefiting from CPAP.  Sees Dr. Augustin Wilson.      10. RHM.  Mammogram due next month and is ordered.  Pap smear not needed due to hysterectomy.  Tdap UTD.  Flu vaccine today.  Pt had dose pneumovax.  Shingrix at pharmacy.      Medications, including side effects, were discussed with the patient. Patient verbalized understanding.  The plan of care was  discussed. All questions were answered. Patient verbalized understanding.      Return in about 6 months (around 7/8/2025) for Medicare Wellness.

## 2025-01-16 DIAGNOSIS — K21.00 GASTROESOPHAGEAL REFLUX DISEASE WITH ESOPHAGITIS WITHOUT HEMORRHAGE: ICD-10-CM

## 2025-01-24 ENCOUNTER — HOSPITAL ENCOUNTER (OUTPATIENT)
Dept: MRI IMAGING | Facility: HOSPITAL | Age: 60
Discharge: HOME | End: 2025-01-24
Payer: MEDICARE

## 2025-01-24 DIAGNOSIS — M67.911 DYSFUNCTION OF ROTATOR CUFF OF BOTH SHOULDERS: ICD-10-CM

## 2025-01-24 DIAGNOSIS — M67.912 DYSFUNCTION OF ROTATOR CUFF OF BOTH SHOULDERS: ICD-10-CM

## 2025-01-24 DIAGNOSIS — R52 PAIN: ICD-10-CM

## 2025-01-24 DIAGNOSIS — G89.29 CHRONIC PAIN OF BOTH SHOULDERS: ICD-10-CM

## 2025-01-24 DIAGNOSIS — M25.512 CHRONIC PAIN OF BOTH SHOULDERS: ICD-10-CM

## 2025-01-24 DIAGNOSIS — M25.511 CHRONIC PAIN OF BOTH SHOULDERS: ICD-10-CM

## 2025-01-24 PROCEDURE — 73221 MRI JOINT UPR EXTREM W/O DYE: CPT

## 2025-01-29 DIAGNOSIS — K21.00 GASTROESOPHAGEAL REFLUX DISEASE WITH ESOPHAGITIS WITHOUT HEMORRHAGE: ICD-10-CM

## 2025-01-29 RX ORDER — VONOPRAZAN FUMARATE 13.36 MG/1
10 TABLET ORAL DAILY
Qty: 90 TABLET | Refills: 3 | Status: SHIPPED | OUTPATIENT
Start: 2025-01-29 | End: 2025-01-29 | Stop reason: SDUPTHER

## 2025-01-29 RX ORDER — ACYCLOVIR 400 MG/1
400 TABLET ORAL
Qty: 150 TABLET | Refills: 2 | Status: SHIPPED | OUTPATIENT
Start: 2025-01-29

## 2025-01-29 NOTE — TELEPHONE ENCOUNTER
Requesting 90 day supply    Rx Refill Note  Requested Prescriptions     Pending Prescriptions Disp Refills    acyclovir (ZOVIRAX) 400 MG tablet 150 tablet 2     Sig: Take 1 tablet by mouth 5 (Five) Times a Day.      Last office visit with prescribing clinician: 1/8/2025   Last telemedicine visit with prescribing clinician: Visit date not found   Next office visit with prescribing clinician: 7/9/2025                         Would you like a call back once the refill request has been completed: [] Yes [] No    If the office needs to give you a call back, can they leave a voicemail: [] Yes [] No    Jomar Magaña MA  01/29/25, 14:46 EST

## 2025-01-30 RX ORDER — VONOPRAZAN FUMARATE 13.36 MG/1
10 TABLET ORAL DAILY
Qty: 10 TABLET | Refills: 0 | COMMUNITY
Start: 2025-01-30

## 2025-01-31 ENCOUNTER — OFFICE VISIT (OUTPATIENT)
Dept: ORTHOPEDIC SURGERY | Facility: CLINIC | Age: 60
End: 2025-01-31
Payer: MEDICARE

## 2025-01-31 ENCOUNTER — TELEPHONE (OUTPATIENT)
Dept: GASTROENTEROLOGY | Facility: CLINIC | Age: 60
End: 2025-01-31
Payer: MEDICARE

## 2025-01-31 VITALS — HEIGHT: 60 IN | TEMPERATURE: 98.4 F | BODY MASS INDEX: 38.24 KG/M2 | WEIGHT: 194.8 LBS

## 2025-01-31 DIAGNOSIS — M75.122 COMPLETE TEAR OF LEFT ROTATOR CUFF, UNSPECIFIED WHETHER TRAUMATIC: Primary | ICD-10-CM

## 2025-01-31 PROCEDURE — 99214 OFFICE O/P EST MOD 30 MIN: CPT | Performed by: ORTHOPAEDIC SURGERY

## 2025-01-31 PROCEDURE — 1160F RVW MEDS BY RX/DR IN RCRD: CPT | Performed by: ORTHOPAEDIC SURGERY

## 2025-01-31 PROCEDURE — 1159F MED LIST DOCD IN RCRD: CPT | Performed by: ORTHOPAEDIC SURGERY

## 2025-01-31 NOTE — PROGRESS NOTES
Patient: Aundrea Mcgarry  YOB: 1965  Date of Service: 1/31/2025    Chief Complaints: Bilateral shoulder pain    Subjective:    History of Present Illness: Pt is seen in the office today with complaints of bilateral shoulder pain left greater than rightI saw her previously but she also saw Holland last Holland did bilateral MRIs.  MRI on the right demonstrates a partial cuff tear as well as a type I or II SLAP tear on the left she does have a full-thickness tear as well as a SLAP tear.  The left 1 is worse she is exhausted conservative measures form of injections physical therapy activity modification she wishes to proceed to the neck step        Allergies:   Allergies   Allergen Reactions    Erythromycin Nausea And Vomiting    Lactose Intolerance (Gi) GI Intolerance    Adhesive Tape Rash       Medications:   Home Medications:  Current Outpatient Medications on File Prior to Visit   Medication Sig    acetaminophen (TYLENOL) 650 MG 8 hr tablet Take 1 tablet by mouth Every 8 (Eight) Hours As Needed for Mild Pain.    acyclovir (ZOVIRAX) 400 MG tablet Take 1 tablet by mouth 5 (Five) Times a Day.    aluminum hydroxide-mag carbonate (GAVISCON EXTRA RELIEF) 160-105 MG chewable tablet chewable tablet Chew 1-2 tablets As Needed.    buPROPion XL (WELLBUTRIN XL) 300 MG 24 hr tablet Take 1 tablet by mouth Every Morning.    Collagen-Vitamin C-Biotin (Collagen 1500/C) 500-50-0.8 MG capsule     cyclobenzaprine (FLEXERIL) 5 MG tablet Take 1-2 tablets by mouth 3 (Three) Times a Day As Needed for Muscle Spasms.    dilTIAZem (Cardizem) 30 MG tablet Take 1 tablet by mouth 3 (Three) Times a Day. (Patient taking differently: Take 1 tablet by mouth As Needed.)    escitalopram (LEXAPRO) 20 MG tablet Take 1 tablet by mouth Daily.    famotidine (PEPCID) 40 MG tablet TAKE 1 TABLET BY MOUTH 2 TIMES A DAY WITH LUNCH AND DINNER    gabapentin (NEURONTIN) 400 MG capsule Take 1 capsule by mouth 3 (Three) Times a Day.     levothyroxine (SYNTHROID, LEVOTHROID) 125 MCG tablet Take 1 tablet by mouth Daily.    metoprolol tartrate (LOPRESSOR) 25 MG tablet Take 1 tablet by mouth 2 (Two) Times a Day.    MULTIPLE VITAMIN PO Take 1 tablet by mouth Daily.    oxybutynin XL (DITROPAN-XL) 5 MG 24 hr tablet Take 1 tablet by mouth Daily.    traZODone (DESYREL) 100 MG tablet Take 1 tablet by mouth Every Night.    vitamin B-12 (CYANOCOBALAMIN) 100 MCG tablet Take 0.5 tablets by mouth Daily.    Vonoprazan Fumarate (Voquezna) 10 MG tablet Take 1 tablet by mouth Daily.     No current facility-administered medications on file prior to visit.     Current Medications:  Scheduled Meds:  Continuous Infusions:No current facility-administered medications for this visit.    PRN Meds:.    I have reviewed the patient's medical history in detail and updated the computerized patient record.  Review and summarization of old records include:    Past Medical History:   Diagnosis Date    Allergic     Erythromycin    Anxiety and depression     Arthritis     Arthritis of back     Arthritis of neck     Broken jaw     1995      Cervical disc disorder 2024    Cholelithiasis 2017    Colon polyp 2023    CTS (carpal tunnel syndrome)     Diverticulosis     Esophageal spasm     Esophageal varices     Spasms    Fibromyalgia     Fibromyalgia, primary     Frozen shoulder     GERD (gastroesophageal reflux disease)     Headache 2019    Hip arthrosis     Hypertension     Hypothyroidism     IBS (irritable bowel syndrome)     Lactose intolerance     Low back strain     Nausea     chronic    Overactive bladder     Periarthritis of shoulder     Pneumonia     PONV (postoperative nausea and vomiting)     PVC (premature ventricular contraction)     Rheumatoid arthritis     Rotator cuff syndrome     Stroke     Umbilical hernia         Past Surgical History:   Procedure Laterality Date    ABDOMINAL SURGERY      BREAST BIOPSY Right early 90's    BUNIONECTOMY Right 09/30/2022    CARPAL TUNNEL  RELEASE  11/2021    Dr. Das    CHOLECYSTECTOMY  2017    CHOLECYSTECTOMY WITH INTRAOPERATIVE CHOLANGIOGRAM N/A 06/30/2017    Procedure: CHOLECYSTECTOMY LAPAROSCOPIC INTRAOPERATIVE CHOLANGIOGRAM;  Surgeon: David Kirkland MD;  Location: [facility] OR [facility];  Service:     COLONOSCOPY      COLONOSCOPY  2013?    DR BRAR    COLONOSCOPY W/ POLYPECTOMY N/A 06/30/2023    Procedure: COLONOSCOPY WITH POLYPECTOMY;  Surgeon: Oleksandr Lorenzo MD;  Location:  [facility] OR;  Service: Gastroenterology;  Laterality: N/A;  ascending colon polyp (cold snare)  transverse colon polyp    ENDOSCOPY N/A 12/01/2017    Procedure: ESOPHAGOGASTRODUODENOSCOPY with esophageal dilitation to 20mm,, and tissue biopsies/ polypectomies esophagus and gastric;  Surgeon: Meliza Pope MD;  Location: [facility] OR;  Service:     ENDOSCOPY N/A 06/30/2023    Procedure: ESOPHAGOGASTRODUODENOSCOPY WITH BIOPSY;  Surgeon: Oleksandr Lorenzo MD;  Location: [facility] OR;  Service: Gastroenterology;  Laterality: N/A;  gastritis-gastric biopsy  esophagitis- distal esophagus biopsy    ESOPHAGOSCOPY  06/30/2023    Procedure: ESOPHAGOSCOPY WITH DILATATION;  Surgeon: Oleksandr Lorenzo MD;  Location:  [facility] OR;  Service: Gastroenterology;;  dilate to 18mm    EYE SURGERY Bilateral     LENS REPLACEMENT    FLEXIBLE SIGMOIDOSCOPY      HAND SURGERY      HIP SURGERY Left     HYSTERECTOMY      HYSTERECTOMY  2002    INGUINAL HERNIA REPAIR Left     as a child    LASER ABLATION N/A 2002    MANDIBLE FRACTURE SURGERY      SUBTOTAL HYSTERECTOMY  2002    TONSILLECTOMY      TRIGGER POINT INJECTION      TUBAL ABDOMINAL LIGATION  1989    UMBILICAL HERNIA REPAIR N/A 07/31/2020    Procedure: INCISIONAL HERNIA REPAIR WITH MESH;  Surgeon: David Kirkland MD;  Location: [facility] OR [facility];  Service: General;  Laterality: N/A;    UPPER GASTROINTESTINAL ENDOSCOPY N/A 07/20/2011    Normal upper endosopy - Dr. Meliza Pope    WRIST SURGERY          Social History      Occupational History    Not on file   Tobacco Use    Smoking status: Former     Current packs/day: 0.00     Average packs/day: 0.3 packs/day for 3.0 years (0.8 ttl pk-yrs)     Types: Cigarettes     Start date: 2001     Quit date: 2004     Years since quittin.6     Passive exposure: Past    Smokeless tobacco: Never   Vaping Use    Vaping status: Never Used   Substance and Sexual Activity    Alcohol use: Yes     Alcohol/week: 6.0 standard drinks of alcohol     Types: 5 Glasses of wine, 1 Cans of beer per week     Comment: weekly    Drug use: Never    Sexual activity: Defer      Social History     Social History Narrative    Not on file        Family History   Problem Relation Age of Onset    Thyroid disease Mother     Hypertension Mother     Arthritis Mother     Hearing loss Mother     Osteoporosis Mother     Stroke Mother     Diabetes Father     Hypertension Father     Heart disease Father     Hyperlipidemia Father     Kidney disease Father     Breast cancer Maternal Aunt     Cancer Maternal Aunt         Breast    Breast cancer Paternal Aunt     Heart disease Brother     Diabetes Brother     Anxiety disorder Daughter     Depression Daughter     Mental illness Daughter     Mental illness Daughter     Thyroid disease Daughter     Cancer Maternal Grandfather         lung -     Thyroid disease Maternal Grandmother     Rheumatologic disease Maternal Grandmother     Cancer Maternal Aunt         breast - survived    Cancer Paternal Aunt         breast -     Diabetes Brother     Crohn's disease Maternal Aunt     Malig Hyperthermia Neg Hx        ROS: 14 point review of systems was performed and was negative except for documented findings in HPI and today's encounter.     Allergies:   Allergies   Allergen Reactions    Erythromycin Nausea And Vomiting    Lactose Intolerance (Gi) GI Intolerance    Adhesive Tape Rash     Constitutional:  Denies fever, shaking or chills   Eyes:  Denies change in  visual acuity   HENT:  Denies nasal congestion or sore throat   Respiratory:  Denies cough or shortness of breath   Cardiovascular:  Denies chest pain or severe LE edema   GI:  Denies abdominal pain, nausea, vomiting, bloody stools or diarrhea   Musculoskeletal:  Numbness, tingling, or loss of motor function only as noted above in history of present illness.  : Denies painful urination or hematuria  Integument:  Denies rash, lesion or ulceration   Neurologic:  Denies headache or focal weakness  Endocrine:  Denies lymphadenopathy  Psych:  Denies confusion or change in mental status   Hem:  Denies active bleeding      Physical Exam: 59 y.o. female  Wt Readings from Last 3 Encounters:   01/08/25 87.5 kg (193 lb)   01/23/25 87.5 kg (193 lb)   01/02/25 88.2 kg (194 lb 6.4 oz)       There is no height or weight on file to calculate BMI.    There were no vitals filed for this visit.  Vital signs reviewed.   General Appearance:    Alert, cooperative, in no acute distress                    Ortho exam    Physical exam of the left shoulder reveals no overlying skin changes no lymphedema no lymphadenopathy.  Patient has active flexion 180 with mild symptoms abduction is similar external rotation is to 50 and internal rotation to the upper lumbar spine with mild symptoms.  Patient has good rotator cuff strength 4+ over 5 with isometric strength testing with pain.  Patient has a positive impingement and a positive Love sign.  Patient has good cervical range of motion which is full and asymptomatic no radicular symptoms.  Patient has a normal elbow exam.  Good distal pulses are presentPatient has pain with overhead activity and a positive Neer sign and a positive empty can sign  They have a positive drop arm any definitive painful arc        Physical exam of the right shoulder reveals no overlying skin changes no lymphedema no lymphadenopathy.  Patient has active flexion 180 with mild symptoms abduction is similar external  "rotation is to 50 and internal rotation to the upper lumbar spine with mild symptoms.  Patient has good rotator cuff strength 4+ over 5 with isometric strength testing with pain.  Patient has a positive impingement and a positive Love sign.  Patient has good cervical range of motion which is full and asymptomatic no radicular symptoms.  Patient has a normal elbow exam.  Good distal pulses are present  Patient has pain with overhead activity and a positive Neer sign and a positive empty can sign , a positive drop arm and a definitive painful arc     Physical Exam: 59 y.o. female  General Appearance:    Alert, cooperative, in no acute distress                      Vitals:    01/31/25 1333   Temp: 98.4 °F (36.9 °C)   TempSrc: Temporal   Weight: 88.4 kg (194 lb 12.8 oz)   Height: 152.4 cm (60\")   PainSc:   5   PainLoc: Shoulder        Head:    Normocephalic, without obvious abnormality, atraumatic   Eyes:            conjunctivae and sclerae normal, no pallor, corneas clear,    Ears:    Ears appear intact with no abnormalities noted   Throat:   No oral lesions, no thrush, oral mucosa moist   Neck:   No adenopathy, supple, trachea midline, no thyromegaly,    Back:     No kyphosis present, no scoliosis present, no skin lesions,      erythema or scars, no tenderness to percussion or                   palpation,   range of motion normal   Lungs:     ,respirations regular, even and                  unlabored    Heart:    Regular rhythm and normal rate               Chest Wall:    No abnormalities observed               Pulses:   Pulses palpable and equal bilaterally   Skin:   No bleeding, bruising or rash   Lymph nodes:   No palpable adenopathy   Neurologic:   Appears neurologic intact         MRI of the right and left shoulder are as above I have reviewed both and agree with the findings  Assessment: Right shoulder partial rotator cuff tear we will consider continued conservative management on that should we fail that in " the future we will consider arthroscopic management.  On the left she does have a full-thickness rotator cuff tear plan is to proceed with arthroscopy and repair she understands risk benefits and alternatives  The patient voiced understanding of the risks, benefits, and alternative forms of treatment that were discussed and the patient consents to proceed with the above listed surgery.  All risks, benefits and alternatives were discussed.  Risks including to but not exclusive to anesthetic complications, including death, MI, CVA, infection, bleeding DVT, fracture, residual pain and need for future surgery.  She understands those and agrees to proceed    Plan: As above  Follow up as indicated.  Ice, elevate, and rest as needed.  Discussed conservative measures of pain control including ice, bracing.    Teri Olmos M.D.

## 2025-02-13 ENCOUNTER — PRE-ADMISSION TESTING (OUTPATIENT)
Dept: PREADMISSION TESTING | Facility: HOSPITAL | Age: 60
End: 2025-02-13
Payer: MEDICARE

## 2025-02-13 VITALS
BODY MASS INDEX: 38.68 KG/M2 | WEIGHT: 197 LBS | SYSTOLIC BLOOD PRESSURE: 133 MMHG | TEMPERATURE: 97.7 F | HEIGHT: 60 IN | HEART RATE: 64 BPM | DIASTOLIC BLOOD PRESSURE: 78 MMHG | RESPIRATION RATE: 18 BRPM | OXYGEN SATURATION: 95 %

## 2025-02-13 LAB
ANION GAP SERPL CALCULATED.3IONS-SCNC: 11 MMOL/L (ref 5–15)
BUN SERPL-MCNC: 11 MG/DL (ref 8–23)
BUN/CREAT SERPL: 13.9 (ref 7–25)
CALCIUM SPEC-SCNC: 9.3 MG/DL (ref 8.6–10.5)
CHLORIDE SERPL-SCNC: 100 MMOL/L (ref 98–107)
CO2 SERPL-SCNC: 24 MMOL/L (ref 22–29)
CREAT SERPL-MCNC: 0.79 MG/DL (ref 0.57–1)
DEPRECATED RDW RBC AUTO: 43.1 FL (ref 37–54)
EGFRCR SERPLBLD CKD-EPI 2021: 85.8 ML/MIN/1.73
ERYTHROCYTE [DISTWIDTH] IN BLOOD BY AUTOMATED COUNT: 12 % (ref 12.3–15.4)
GLUCOSE SERPL-MCNC: 94 MG/DL (ref 65–99)
HCT VFR BLD AUTO: 40.7 % (ref 34–46.6)
HGB BLD-MCNC: 14 G/DL (ref 12–15.9)
MCH RBC QN AUTO: 33.9 PG (ref 26.6–33)
MCHC RBC AUTO-ENTMCNC: 34.4 G/DL (ref 31.5–35.7)
MCV RBC AUTO: 98.5 FL (ref 79–97)
PLATELET # BLD AUTO: 236 10*3/MM3 (ref 140–450)
PMV BLD AUTO: 11.2 FL (ref 6–12)
POTASSIUM SERPL-SCNC: 3.9 MMOL/L (ref 3.5–5.2)
QT INTERVAL: 434 MS
QTC INTERVAL: 416 MS
RBC # BLD AUTO: 4.13 10*6/MM3 (ref 3.77–5.28)
SODIUM SERPL-SCNC: 135 MMOL/L (ref 136–145)
WBC NRBC COR # BLD AUTO: 6.61 10*3/MM3 (ref 3.4–10.8)

## 2025-02-13 PROCEDURE — 85027 COMPLETE CBC AUTOMATED: CPT

## 2025-02-13 PROCEDURE — 93005 ELECTROCARDIOGRAM TRACING: CPT

## 2025-02-13 PROCEDURE — 36415 COLL VENOUS BLD VENIPUNCTURE: CPT

## 2025-02-13 PROCEDURE — 80048 BASIC METABOLIC PNL TOTAL CA: CPT

## 2025-02-13 PROCEDURE — 93010 ELECTROCARDIOGRAM REPORT: CPT | Performed by: INTERNAL MEDICINE

## 2025-02-13 NOTE — DISCHARGE INSTRUCTIONS
Take the following medications the morning of surgery:  GABAPENTIN, BUPROPION, METOPROLOL, LEVOTHYROXINE, PEPCID    If you are on prescription narcotic pain medication to control your pain you may also take that medication the morning of surgery.      General Instructions:     Do not eat solid food after midnight the night before surgery.  Clear liquids day of surgery are allowed but must be stopped at least two hours before your hospital arrival time.       Allowed clear liquids      Water, sodas, and tea or coffee with no cream or milk added.       12 to 20 ounces of a clear liquid that contains carbohydrates is recommended.  If non-diabetic, have Gatorade or Powerade.  If diabetic, have G2 or Powerade Zero.     Do not have liquids red in color.  Do not consume chicken, beef, pork or vegetable broth or bouillon cubes of any variety as they are not considered clear liquids and are not allowed.      Infants may have breast milk up to four hours before surgery.  Infants drinking formula may drink formula up to six hours before surgery.   Patients who avoid smoking, chewing tobacco and alcohol for 4 weeks prior to surgery have a reduced risk of post-operative complications.  Quit smoking as many days before surgery as you can.  Do not smoke, use chewing tobacco or drink alcohol the day of surgery.   If applicable bring your C-PAP/ BI-PAP machine in with you to preop day of surgery.  Bring any papers given to you in the doctor’s office.  Wear clean comfortable clothes.  Do not wear contact lenses, false eyelashes or make-up.  Bring a case for your glasses.   Bring crutches or walker if applicable.  Remove all piercings.  Leave jewelry and any other valuables at home.  Hair extensions with metal clips must be removed prior to surgery.  The Pre-Admission Testing nurse will instruct you to bring medications if unable to obtain an accurate list in Pre-Admission Testing.        If you were given a blood bank ID arm band  remember to bring it with you the day of surgery.    Preventing a Surgical Site Infection:  For 2 to 3 days before surgery, avoid shaving with a razor because the razor can irritate skin and make it easier to develop an infection.    Any areas of open skin can increase the risk of a post-operative wound infection by allowing bacteria to enter and travel throughout the body.  Notify your surgeon if you have any skin wounds / rashes even if it is not near the expected surgical site.  The area will need assessed to determine if surgery should be delayed until it is healed.  The night prior to surgery shower using a fresh bar of anti-bacterial soap (such as Dial) and clean washcloth.  Sleep in a clean bed with clean clothing.  Do not allow pets to sleep with you.  Shower on the morning of surgery using a fresh bar of anti-bacterial soap (such as Dial) and clean washcloth.  Dry with a clean towel and dress in clean clothing.  Ask your surgeon if you will be receiving antibiotics prior to surgery.  Make sure you, your family, and all healthcare providers clean their hands with soap and water or an alcohol based hand  before caring for you or your wound.    Day of surgery:  Your arrival time is approximately two hours before your scheduled surgery time.  Please note if you have an early arrival time the surgery doors do not open before 5:00 AM.  Upon arrival, a Pre-op nurse and Anesthesiologist will review your health history, obtain vital signs, and answer questions you may have.  The only belongings needed at this time will be a list of your home medications and if applicable your C-PAP/BI-PAP machine.  A Pre-op nurse will start an IV and you may receive medication in preparation for surgery, including something to help you relax.     Please be aware that surgery does come with discomfort.  We want to make every effort to control your discomfort so please discuss any uncontrolled symptoms with your nurse.   Your  doctor will most likely have prescribed pain medications.      If you are going home after surgery you will receive individualized written care instructions before being discharged.  A responsible adult must drive you to and from the hospital on the day of your surgery and ideally stay with you through the night.   .  Discharge prescriptions can be filled by the hospital pharmacy during regular pharmacy hours.  If you are having surgery late in the day/evening your prescription may be e-prescribed to your pharmacy.  Please verify your pharmacy hours or chose a 24 hour pharmacy to avoid not having access to your prescription because your pharmacy has closed for the day.    If you are staying overnight following surgery, you will be transported to your hospital room following the recovery period.  [facility] has all private rooms.    If you have any questions please call Pre-Admission Testing at [phone].  Deductibles and co-payments are collected on the day of service. Please be prepared to pay the required co-pay, deductible or deposit on the day of service as defined by your plan.    Call your surgeon immediately if you experience any of the following symptoms:  Sore Throat  Shortness of Breath or difficulty breathing  Cough  Chills  Body soreness or muscle pain  Headache  Fever  New loss of taste or smell  Do not arrive for your surgery ill.  Your procedure will need to be rescheduled to another time.  You will need to call your physician before the day of surgery to avoid any unnecessary exposure to hospital staff as well as other patients.

## 2025-02-18 ENCOUNTER — ANESTHESIA (OUTPATIENT)
Dept: PERIOP | Facility: HOSPITAL | Age: 60
End: 2025-02-18
Payer: MEDICARE

## 2025-02-18 ENCOUNTER — ANESTHESIA EVENT (OUTPATIENT)
Dept: PERIOP | Facility: HOSPITAL | Age: 60
End: 2025-02-18
Payer: MEDICARE

## 2025-02-18 ENCOUNTER — HOSPITAL ENCOUNTER (OUTPATIENT)
Facility: HOSPITAL | Age: 60
Setting detail: HOSPITAL OUTPATIENT SURGERY
Discharge: HOME | End: 2025-02-18
Attending: ORTHOPAEDIC SURGERY | Admitting: ORTHOPAEDIC SURGERY
Payer: MEDICARE

## 2025-02-18 VITALS
WEIGHT: 197 LBS | DIASTOLIC BLOOD PRESSURE: 79 MMHG | OXYGEN SATURATION: 93 % | HEART RATE: 61 BPM | TEMPERATURE: 97.5 F | HEIGHT: 60 IN | RESPIRATION RATE: 16 BRPM | BODY MASS INDEX: 38.68 KG/M2 | SYSTOLIC BLOOD PRESSURE: 108 MMHG

## 2025-02-18 DIAGNOSIS — M75.122 COMPLETE TEAR OF LEFT ROTATOR CUFF, UNSPECIFIED WHETHER TRAUMATIC: ICD-10-CM

## 2025-02-18 DIAGNOSIS — K44.9 HIATAL HERNIA: Primary | ICD-10-CM

## 2025-02-18 PROCEDURE — 25010000002 ONDANSETRON PER 1 MG: Performed by: NURSE ANESTHETIST, CERTIFIED REGISTERED

## 2025-02-18 PROCEDURE — 25010000002 BUPIVACAINE (PF) 0.5 % SOLUTION: Performed by: ANESTHESIOLOGY

## 2025-02-18 PROCEDURE — 25810000003 LACTATED RINGERS PER 1000 ML: Performed by: ANESTHESIOLOGY

## 2025-02-18 PROCEDURE — 25010000002 LIDOCAINE PF 2% 2 % SOLUTION: Performed by: NURSE ANESTHETIST, CERTIFIED REGISTERED

## 2025-02-18 PROCEDURE — 25010000002 BUPIVACAINE LIPOSOME 1.3 % SUSPENSION: Performed by: ANESTHESIOLOGY

## 2025-02-18 PROCEDURE — C1713 ANCHOR/SCREW BN/BN,TIS/BN: HCPCS | Performed by: ORTHOPAEDIC SURGERY

## 2025-02-18 PROCEDURE — 25010000002 FENTANYL CITRATE (PF) 50 MCG/ML SOLUTION: Performed by: ANESTHESIOLOGY

## 2025-02-18 PROCEDURE — 25010000002 SUGAMMADEX 200 MG/2ML SOLUTION: Performed by: NURSE ANESTHETIST, CERTIFIED REGISTERED

## 2025-02-18 PROCEDURE — 25010000002 CEFAZOLIN PER 500 MG: Performed by: ORTHOPAEDIC SURGERY

## 2025-02-18 PROCEDURE — 25010000002 MIDAZOLAM PER 1 MG: Performed by: ANESTHESIOLOGY

## 2025-02-18 PROCEDURE — 25010000002 DEXAMETHASONE SODIUM PHOSPHATE 20 MG/5ML SOLUTION: Performed by: NURSE ANESTHETIST, CERTIFIED REGISTERED

## 2025-02-18 PROCEDURE — 25010000002 PROPOFOL 10 MG/ML EMULSION: Performed by: NURSE ANESTHETIST, CERTIFIED REGISTERED

## 2025-02-18 DEVICE — SUTURETAPE 1.3MM WH/BL & WH/BLK
Type: IMPLANTABLE DEVICE | Site: SHOULDER | Status: FUNCTIONAL
Brand: ARTHREX®

## 2025-02-18 DEVICE — FBRTK SPDBRG IMP SYS W/ BC SWVLK
Type: IMPLANTABLE DEVICE | Site: SHOULDER | Status: FUNCTIONAL
Brand: ARTHREX®

## 2025-02-18 RX ORDER — DEXAMETHASONE SODIUM PHOSPHATE 4 MG/ML
INJECTION, SOLUTION INTRA-ARTICULAR; INTRALESIONAL; INTRAMUSCULAR; INTRAVENOUS; SOFT TISSUE AS NEEDED
Status: DISCONTINUED | OUTPATIENT
Start: 2025-02-18 | End: 2025-02-18 | Stop reason: SURG

## 2025-02-18 RX ORDER — BUPIVACAINE HYDROCHLORIDE AND EPINEPHRINE 2.5; 5 MG/ML; UG/ML
INJECTION, SOLUTION EPIDURAL; INFILTRATION; INTRACAUDAL; PERINEURAL
Status: COMPLETED | OUTPATIENT
Start: 2025-02-18 | End: 2025-02-18

## 2025-02-18 RX ORDER — BUPIVACAINE HYDROCHLORIDE 5 MG/ML
INJECTION, SOLUTION EPIDURAL; INTRACAUDAL
Status: COMPLETED | OUTPATIENT
Start: 2025-02-18 | End: 2025-02-18

## 2025-02-18 RX ORDER — ONDANSETRON 2 MG/ML
4 INJECTION INTRAMUSCULAR; INTRAVENOUS ONCE AS NEEDED
Status: DISCONTINUED | OUTPATIENT
Start: 2025-02-18 | End: 2025-02-18 | Stop reason: HOSPADM

## 2025-02-18 RX ORDER — PROPOFOL 10 MG/ML
VIAL (ML) INTRAVENOUS AS NEEDED
Status: DISCONTINUED | OUTPATIENT
Start: 2025-02-18 | End: 2025-02-18 | Stop reason: SURG

## 2025-02-18 RX ORDER — ATROPINE SULFATE 0.4 MG/ML
0.4 INJECTION, SOLUTION INTRAMUSCULAR; INTRAVENOUS; SUBCUTANEOUS ONCE AS NEEDED
Status: DISCONTINUED | OUTPATIENT
Start: 2025-02-18 | End: 2025-02-18 | Stop reason: HOSPADM

## 2025-02-18 RX ORDER — ONDANSETRON 4 MG/1
4 TABLET, FILM COATED ORAL EVERY 8 HOURS PRN
Qty: 12 TABLET | Refills: 0 | Status: SHIPPED | OUTPATIENT
Start: 2025-02-18

## 2025-02-18 RX ORDER — EPHEDRINE SULFATE 50 MG/ML
5 INJECTION, SOLUTION INTRAVENOUS ONCE AS NEEDED
Status: DISCONTINUED | OUTPATIENT
Start: 2025-02-18 | End: 2025-02-18 | Stop reason: HOSPADM

## 2025-02-18 RX ORDER — ROCURONIUM BROMIDE 10 MG/ML
INJECTION, SOLUTION INTRAVENOUS AS NEEDED
Status: DISCONTINUED | OUTPATIENT
Start: 2025-02-18 | End: 2025-02-18 | Stop reason: SURG

## 2025-02-18 RX ORDER — SODIUM CHLORIDE, SODIUM LACTATE, POTASSIUM CHLORIDE, CALCIUM CHLORIDE 600; 310; 30; 20 MG/100ML; MG/100ML; MG/100ML; MG/100ML
9 INJECTION, SOLUTION INTRAVENOUS CONTINUOUS
Status: DISCONTINUED | OUTPATIENT
Start: 2025-02-18 | End: 2025-02-18 | Stop reason: HOSPADM

## 2025-02-18 RX ORDER — FAMOTIDINE 10 MG/ML
20 INJECTION, SOLUTION INTRAVENOUS ONCE
Status: COMPLETED | OUTPATIENT
Start: 2025-02-18 | End: 2025-02-18

## 2025-02-18 RX ORDER — FENTANYL CITRATE 50 UG/ML
50 INJECTION, SOLUTION INTRAMUSCULAR; INTRAVENOUS
Status: DISCONTINUED | OUTPATIENT
Start: 2025-02-18 | End: 2025-02-18 | Stop reason: HOSPADM

## 2025-02-18 RX ORDER — PROMETHAZINE HYDROCHLORIDE 25 MG/1
25 TABLET ORAL ONCE AS NEEDED
Status: DISCONTINUED | OUTPATIENT
Start: 2025-02-18 | End: 2025-02-18 | Stop reason: HOSPADM

## 2025-02-18 RX ORDER — SODIUM CHLORIDE 0.9 % (FLUSH) 0.9 %
3 SYRINGE (ML) INJECTION EVERY 12 HOURS SCHEDULED
Status: DISCONTINUED | OUTPATIENT
Start: 2025-02-18 | End: 2025-02-18 | Stop reason: HOSPADM

## 2025-02-18 RX ORDER — OXYCODONE AND ACETAMINOPHEN 7.5; 325 MG/1; MG/1
1 TABLET ORAL EVERY 4 HOURS PRN
Status: DISCONTINUED | OUTPATIENT
Start: 2025-02-18 | End: 2025-02-18 | Stop reason: HOSPADM

## 2025-02-18 RX ORDER — IPRATROPIUM BROMIDE AND ALBUTEROL SULFATE 2.5; .5 MG/3ML; MG/3ML
3 SOLUTION RESPIRATORY (INHALATION) ONCE AS NEEDED
Status: DISCONTINUED | OUTPATIENT
Start: 2025-02-18 | End: 2025-02-18 | Stop reason: HOSPADM

## 2025-02-18 RX ORDER — PROMETHAZINE HYDROCHLORIDE 25 MG/1
25 SUPPOSITORY RECTAL ONCE AS NEEDED
Status: DISCONTINUED | OUTPATIENT
Start: 2025-02-18 | End: 2025-02-18 | Stop reason: HOSPADM

## 2025-02-18 RX ORDER — MIDAZOLAM HYDROCHLORIDE 1 MG/ML
1 INJECTION, SOLUTION INTRAMUSCULAR; INTRAVENOUS
Status: DISCONTINUED | OUTPATIENT
Start: 2025-02-18 | End: 2025-02-18 | Stop reason: HOSPADM

## 2025-02-18 RX ORDER — SODIUM CHLORIDE 0.9 % (FLUSH) 0.9 %
3-10 SYRINGE (ML) INJECTION AS NEEDED
Status: DISCONTINUED | OUTPATIENT
Start: 2025-02-18 | End: 2025-02-18 | Stop reason: HOSPADM

## 2025-02-18 RX ORDER — FLUMAZENIL 0.1 MG/ML
0.2 INJECTION INTRAVENOUS AS NEEDED
Status: DISCONTINUED | OUTPATIENT
Start: 2025-02-18 | End: 2025-02-18 | Stop reason: HOSPADM

## 2025-02-18 RX ORDER — ONDANSETRON 2 MG/ML
INJECTION INTRAMUSCULAR; INTRAVENOUS AS NEEDED
Status: DISCONTINUED | OUTPATIENT
Start: 2025-02-18 | End: 2025-02-18 | Stop reason: SURG

## 2025-02-18 RX ORDER — DIPHENHYDRAMINE HYDROCHLORIDE 50 MG/ML
12.5 INJECTION INTRAMUSCULAR; INTRAVENOUS
Status: DISCONTINUED | OUTPATIENT
Start: 2025-02-18 | End: 2025-02-18 | Stop reason: HOSPADM

## 2025-02-18 RX ORDER — LIDOCAINE HYDROCHLORIDE 20 MG/ML
INJECTION, SOLUTION EPIDURAL; INFILTRATION; INTRACAUDAL; PERINEURAL AS NEEDED
Status: DISCONTINUED | OUTPATIENT
Start: 2025-02-18 | End: 2025-02-18 | Stop reason: SURG

## 2025-02-18 RX ORDER — LABETALOL HYDROCHLORIDE 5 MG/ML
5 INJECTION, SOLUTION INTRAVENOUS
Status: DISCONTINUED | OUTPATIENT
Start: 2025-02-18 | End: 2025-02-18 | Stop reason: HOSPADM

## 2025-02-18 RX ORDER — HYDROMORPHONE HYDROCHLORIDE 1 MG/ML
0.5 INJECTION, SOLUTION INTRAMUSCULAR; INTRAVENOUS; SUBCUTANEOUS
Status: DISCONTINUED | OUTPATIENT
Start: 2025-02-18 | End: 2025-02-18 | Stop reason: HOSPADM

## 2025-02-18 RX ORDER — ACETAMINOPHEN 500 MG
1000 TABLET ORAL ONCE
Status: COMPLETED | OUTPATIENT
Start: 2025-02-18 | End: 2025-02-18

## 2025-02-18 RX ORDER — NALOXONE HCL 0.4 MG/ML
0.2 VIAL (ML) INJECTION AS NEEDED
Status: DISCONTINUED | OUTPATIENT
Start: 2025-02-18 | End: 2025-02-18 | Stop reason: HOSPADM

## 2025-02-18 RX ORDER — EPHEDRINE SULFATE 50 MG/ML
INJECTION INTRAVENOUS AS NEEDED
Status: DISCONTINUED | OUTPATIENT
Start: 2025-02-18 | End: 2025-02-18 | Stop reason: SURG

## 2025-02-18 RX ORDER — SODIUM CHLORIDE, SODIUM LACTATE, POTASSIUM CHLORIDE, AND CALCIUM CHLORIDE .6; .31; .03; .02 G/100ML; G/100ML; G/100ML; G/100ML
IRRIGANT IRRIGATION AS NEEDED
Status: DISCONTINUED | OUTPATIENT
Start: 2025-02-18 | End: 2025-02-18 | Stop reason: HOSPADM

## 2025-02-18 RX ORDER — OXYCODONE AND ACETAMINOPHEN 5; 325 MG/1; MG/1
1 TABLET ORAL EVERY 4 HOURS PRN
Qty: 28 TABLET | Refills: 0 | Status: SHIPPED | OUTPATIENT
Start: 2025-02-18

## 2025-02-18 RX ORDER — HYDRALAZINE HYDROCHLORIDE 20 MG/ML
5 INJECTION INTRAMUSCULAR; INTRAVENOUS
Status: DISCONTINUED | OUTPATIENT
Start: 2025-02-18 | End: 2025-02-18 | Stop reason: HOSPADM

## 2025-02-18 RX ORDER — WOUND DRESSING ADHESIVE - LIQUID
LIQUID MISCELLANEOUS AS NEEDED
Status: DISCONTINUED | OUTPATIENT
Start: 2025-02-18 | End: 2025-02-18 | Stop reason: HOSPADM

## 2025-02-18 RX ORDER — HYDROCODONE BITARTRATE AND ACETAMINOPHEN 5; 325 MG/1; MG/1
1 TABLET ORAL ONCE AS NEEDED
Status: DISCONTINUED | OUTPATIENT
Start: 2025-02-18 | End: 2025-02-18 | Stop reason: HOSPADM

## 2025-02-18 RX ADMIN — ONDANSETRON 4 MG: 2 INJECTION, SOLUTION INTRAMUSCULAR; INTRAVENOUS at 10:00

## 2025-02-18 RX ADMIN — BUPIVACAINE HYDROCHLORIDE 10 ML: 5 INJECTION, SOLUTION EPIDURAL; INTRACAUDAL; PERINEURAL at 08:14

## 2025-02-18 RX ADMIN — SODIUM CHLORIDE, POTASSIUM CHLORIDE, SODIUM LACTATE AND CALCIUM CHLORIDE: 600; 310; 30; 20 INJECTION, SOLUTION INTRAVENOUS at 09:12

## 2025-02-18 RX ADMIN — ACETAMINOPHEN 1000 MG: 500 TABLET, FILM COATED ORAL at 07:27

## 2025-02-18 RX ADMIN — OXYCODONE HYDROCHLORIDE AND ACETAMINOPHEN 1 TABLET: 7.5; 325 TABLET ORAL at 12:12

## 2025-02-18 RX ADMIN — LIDOCAINE HYDROCHLORIDE 100 MG: 20 INJECTION, SOLUTION EPIDURAL; INFILTRATION; INTRACAUDAL; PERINEURAL at 09:18

## 2025-02-18 RX ADMIN — DEXAMETHASONE SODIUM PHOSPHATE 8 MG: 4 INJECTION, SOLUTION INTRAMUSCULAR; INTRAVENOUS at 09:23

## 2025-02-18 RX ADMIN — PROPOFOL 25 MCG/KG/MIN: 10 INJECTION, EMULSION INTRAVENOUS at 09:23

## 2025-02-18 RX ADMIN — FAMOTIDINE 20 MG: 10 INJECTION INTRAVENOUS at 08:27

## 2025-02-18 RX ADMIN — FENTANYL CITRATE 50 MCG: 50 INJECTION, SOLUTION INTRAMUSCULAR; INTRAVENOUS at 08:08

## 2025-02-18 RX ADMIN — ROCURONIUM BROMIDE 50 MG: 10 INJECTION, SOLUTION INTRAVENOUS at 09:18

## 2025-02-18 RX ADMIN — SUGAMMADEX 200 MG: 100 INJECTION, SOLUTION INTRAVENOUS at 10:36

## 2025-02-18 RX ADMIN — SODIUM CHLORIDE 2 G: 900 INJECTION INTRAVENOUS at 09:10

## 2025-02-18 RX ADMIN — PROPOFOL 150 MG: 10 INJECTION, EMULSION INTRAVENOUS at 09:18

## 2025-02-18 RX ADMIN — BUPIVACAINE 10 ML: 13.3 INJECTION, SUSPENSION, LIPOSOMAL INFILTRATION at 08:14

## 2025-02-18 RX ADMIN — EPHEDRINE SULFATE 20 MG: 50 INJECTION INTRAVENOUS at 09:58

## 2025-02-18 RX ADMIN — MIDAZOLAM 1 MG: 1 INJECTION INTRAMUSCULAR; INTRAVENOUS at 08:08

## 2025-02-18 RX ADMIN — BUPIVACAINE HYDROCHLORIDE AND EPINEPHRINE BITARTRATE 10 ML: 2.5; .005 INJECTION, SOLUTION EPIDURAL; INFILTRATION; INTRACAUDAL; PERINEURAL at 08:14

## 2025-02-18 RX ADMIN — EPHEDRINE SULFATE 10 MG: 50 INJECTION INTRAVENOUS at 09:50

## 2025-02-18 NOTE — OP NOTE
Rotator Cuff RepairOperative Note      Facility: [facility]  Patient Name: Aundrea Mcgarry  YOB: 1965  Date: 2/18/2025  Medical Record Number: [id number]      Pre-op Diagnosis:   Complete tear of left rotator cuff, unspecified whether traumatic [M75.122]    Post-Op Diagnosis Codes:     * Complete tear of left rotator cuff, unspecified whether traumatic [M75.122]    Procedure(s):  LEFT SHOULDER ARTHROSCOPY, LABRAL DEBRIDEMENT, DECOMPRESSION,  WITH MINI OPEN  ROTATOR CUFF REPAIR with Arthrex fiber tack speed bridge    Surgeon(s):  Teri Olmos MD    Anesthesia: General with Block  Anesthesiologist: Haroldo Singletary MD  CRNA: Susan Lopes CRNA    Staff:   Circulator: Diamond Benavidez RN  Scrub Person: Beulah Muro  Vendor Representative: Jean-Claude Riggs; Mannie Young        Estimated Blood Loss: 10 cc    Specimens:   * No orders in the log *    Drains: None    Findings: See Dictation    Complications: None    Indication for procedure:   This patient has had a several month history of left shoulder pain that has been unresponsive to conservative management.  They have an exam and an MRI which are consistent with rotator cuff pathology and they present for arthroscopy and possible repair.  They understand all risks, benefits, and alternatives.  Risk including but not exclusive to anesthetic complications including death, MI, CVA as well as infection, bleeding, DVT, PE, stiffness, failure to relieve their symptoms and need for future surgery.  They understand this and agree to proceed.    Description of procedure:  Patient was taken to the operating room.  They were placed supine on the  operating room table.  After induction of adequate LMA anesthesia and scalene nerve block and IV antibiotics.  They underwent exam under anesthesia was symmetric full range of motion which was symmetric side to side.  They were then placed in the modified beachchair position all  prominent areas well padded and head well stabilized.  The arm was prepped and draped  in usual sterile fashion, bony landmarks were demarcated and the joint was infiltrated with 30 mL of fluid.  A standard posterior portal was made inferior and medial to the posterior lateral edge of the acromion with an 11 blade.  Blunt trocar penetrated into the joint, scope followed  and evaluation began.  An anterior portal was established in the interval between the subscapularis and the biceps tendon with spinal needle localization and direct visualization. She was found have a degenerative tear of the superior aspect of the labrum that was debrided with the Apollo device and the motorized shaver.  The rotator cuff appeared pathologic.    I then exited the space, entered subacromial space. I made accessory lateral portal off the anterior lateral edge of the acromion, placed the shaver and removed thickened bursa. I delineated the anterior lateral edge of the acromion with the Opus device and removed a large spur with a bone cutter. There was plenty of room under this joint after this for the rotator cuff. l. The rotator cuff was evaluated and was found to have a small full-thickness tear.  I did place a retention stitch with the Scorpion device.   2 fiber tack suture anchors were placed along the medial row just along the edge of the cartilage.  I attempted to place a third but her bone was fairly soft and I thought she was adequate this was done in standard technique with good bony pullout.  I used the scorpion device to place the loop fiber tape 1 anterior and 1 posterior these were then cut, crossed and placed into lateral row anchors these were fiber tack anchors as well in standard technique.  Had good approximation of the tendon of the bone.  There was a small area anteriorly and a small area posteriorly of the cuff that needed to be captured I used the sliding suture in each of the 2 lateral row anchors to capture  these in mattress suture fashion.     The decompression was adequate. Everything was thoroughly irrigated. The deltoid is reapproximated with 0 Vicryl, subcutaneous tissue with 2-0 Vicryl. The skin was closed with a running 4-0 subcuticular stitch. Sterile dressings and Steri-Strips were applied. The portals were closed with 2-0 Vicryl. Sling was applied. The patient was taken to recovery room in good condition.  All sponge and needle counts were correct.      Date: 2/18/2025  Time: 11:02 TAQUERIA moore

## 2025-02-18 NOTE — ANESTHESIA PROCEDURE NOTES
Airway  Urgency: elective    Date/Time: 2/18/2025 9:22 AM    General Information and Staff    Patient location during procedure: OR  CRNA/CAA: Susan Lopes CRNA    Indications and Patient Condition  Indications for airway management: airway protection    Preoxygenated: yes  Mask difficulty assessment: 1 - vent by mask    Final Airway Details  Final airway type: endotracheal airway      Successful airway: ETT  Cuffed: yes   Successful intubation technique: direct laryngoscopy  Facilitating devices/methods: intubating stylet  Endotracheal tube insertion site: oral  Blade: Kelley  ETT size (mm): 7.0  Cormack-Lehane Classification: grade I - full view of glottis  Placement verified by: chest auscultation and capnometry   Cuff volume (mL): 7  Measured from: lips  Number of attempts at approach: 1  Assessment: lips, teeth, and gum same as pre-op and atraumatic intubation

## 2025-02-18 NOTE — ANESTHESIA POSTPROCEDURE EVALUATION
"Patient: Aundrea Mcgarry    Procedure Summary       Date: 02/18/25 Room / Location:  [facility] [address] [place] [address] [facility]    Anesthesia Start: 0912 Anesthesia Stop: 1053    Procedure: LEFT SHOULDER ARTHROSCOPY, LABRAL DEBRIDEMENT, DECOMPRESSION,  WITH MINI OPEN  ROTATOR CUFF REPAIR (Left: Shoulder) Diagnosis:       Complete tear of left rotator cuff, unspecified whether traumatic      (Complete tear of left rotator cuff, unspecified whether traumatic [M75.122])    Surgeons: Teri Olmos MD Provider: Haroldo Singletary MD    Anesthesia Type: general ASA Status: 3            Anesthesia Type: general    Vitals  Vitals Value Taken Time   /79 02/18/25 1145   Temp 36.4 °C (97.5 °F) 02/18/25 1145   Pulse 59 02/18/25 1157   Resp 16 02/18/25 1145   SpO2 93 % 02/18/25 1157   Vitals shown include unfiled device data.        Post Anesthesia Care and Evaluation    Patient location during evaluation: bedside  Patient participation: complete - patient participated  Level of consciousness: awake  Pain management: adequate    Airway patency: patent  Anesthetic complications: No anesthetic complications    Cardiovascular status: acceptable  Respiratory status: acceptable  Hydration status: acceptable    Comments: */79   Pulse 61   Temp 36.4 °C (97.5 °F)   Resp 16   Ht 152.4 cm (60\")   Wt 89.4 kg (197 lb)   SpO2 93%   BMI 38.47 kg/m²       "

## 2025-02-18 NOTE — H&P
History & Physical       Patient: Aundrea Mcgarry    Date of Admission: 2/18/2025  6:30 AM    YOB: 1965    Medical Record Number: [id number]    Attending Physician: Teri Olmos MD        Chief Complaints: Complete tear of left rotator cuff, unspecified whether traumatic [M75.122]      History of Present Illness: This patient has several month history of bilateral shoulder pain left is worse than right she has an MRI which consistent with a full-thickness rotator cuff tear she also appears to have SLAP tear she presents for arthroscopy rotator cuff repair probably decompression will address the biceps and the labrum as deemed appropriate with either debridement repair tenotomy or tenodesis     Allergies:   Allergies   Allergen Reactions    Erythromycin Nausea And Vomiting    Lactose Intolerance (Gi) GI Intolerance    Adhesive Tape Rash       Medications:   Home Medications:  No current facility-administered medications on file prior to encounter.     Current Outpatient Medications on File Prior to Encounter   Medication Sig    acetaminophen (TYLENOL) 650 MG 8 hr tablet Take 1 tablet by mouth Every 8 (Eight) Hours As Needed for Mild Pain.    acyclovir (ZOVIRAX) 400 MG tablet Take 1 tablet by mouth 5 (Five) Times a Day. (Patient taking differently: Take 1 tablet by mouth Daily.)    aluminum hydroxide-mag carbonate (GAVISCON EXTRA RELIEF) 160-105 MG chewable tablet chewable tablet Chew 1-2 tablets As Needed.    buPROPion XL (WELLBUTRIN XL) 300 MG 24 hr tablet Take 1 tablet by mouth Every Morning.    Collagen-Vitamin C-Biotin (Collagen 1500/C) 500-50-0.8 MG capsule Take 2 Capfuls by mouth Daily. HOLD FOR SURGERY    cyclobenzaprine (FLEXERIL) 5 MG tablet Take 1-2 tablets by mouth 3 (Three) Times a Day As Needed for Muscle Spasms.    dilTIAZem (Cardizem) 30 MG tablet Take 1 tablet by mouth 3 (Three) Times a Day. (Patient taking differently: Take 1 tablet by mouth As Needed (ESOPHAGEAL SPASMS).)     escitalopram (LEXAPRO) 20 MG tablet Take 1 tablet by mouth Daily. (Patient taking differently: Take 1 tablet by mouth Every Night.)    famotidine (PEPCID) 40 MG tablet TAKE 1 TABLET BY MOUTH 2 TIMES A DAY WITH LUNCH AND DINNER (Patient taking differently: Take 1 tablet by mouth As Needed.)    gabapentin (NEURONTIN) 400 MG capsule Take 1 capsule by mouth 3 (Three) Times a Day. (Patient taking differently: Take 1 capsule by mouth 2 (Two) Times a Day.)    levothyroxine (SYNTHROID, LEVOTHROID) 125 MCG tablet Take 1 tablet by mouth Daily.    metoprolol tartrate (LOPRESSOR) 25 MG tablet Take 1 tablet by mouth 2 (Two) Times a Day.    MULTIPLE VITAMIN PO Take 1 tablet by mouth Daily. HOLD FOR SURGERY    oxybutynin XL (DITROPAN-XL) 5 MG 24 hr tablet Take 1 tablet by mouth Daily. (Patient taking differently: Take 1 tablet by mouth Every Night.)    traZODone (DESYREL) 100 MG tablet Take 1 tablet by mouth Every Night.    vitamin B-12 (CYANOCOBALAMIN) 100 MCG tablet Take 1 tablet by mouth Daily.    Vonoprazan Fumarate (Voquezna) 10 MG tablet Take 1 tablet by mouth Daily.     Current Medications:  Scheduled Meds:acetaminophen, 1,000 mg, Oral, Once  ceFAZolin, 2 g, Intravenous, Once  ethyl alcohol, 2 Swab, Nasal, Once  famotidine, 20 mg, Intravenous, Once  sodium chloride, 3 mL, Intravenous, Q12H      Continuous Infusions:lactated ringers, 9 mL/hr      PRN Meds:.  fentanyl    fentaNYL citrate (PF)    midazolam    midazolam    sodium chloride    Past Medical History:   Diagnosis Date    Allergic     Erythromycin    Anxiety and depression     Arthritis     Cervical disc disorder 2024    Colon polyp 2023    Deviated septum     Diverticulosis     Elevated cholesterol     Esophageal spasm     Esophageal varices     Spasms    Fibromyalgia     Frozen shoulder     GERD (gastroesophageal reflux disease)     Headache 2019    Hearing loss     NO AIDS    Hip arthrosis     Hypertension     Hypothyroidism     IBS (irritable bowel syndrome)      Lactose intolerance     Nausea     chronic    Overactive bladder     Periarthritis of shoulder     Pneumonia     PONV (postoperative nausea and vomiting)     Pulmonary fibrosis     PVC (premature ventricular contraction)     Rotator cuff syndrome     Seasonal allergies     Shoulder pain     LEFT    Sleep apnea     WEARS CPAP    Stroke     TIA    Umbilical hernia         Past Surgical History:   Procedure Laterality Date    BREAST BIOPSY Right early 90's    BUNIONECTOMY Right 09/30/2022    CARPAL TUNNEL RELEASE  11/2021    Dr. Das    CHOLECYSTECTOMY WITH INTRAOPERATIVE CHOLANGIOGRAM N/A 06/30/2017    Procedure: CHOLECYSTECTOMY LAPAROSCOPIC INTRAOPERATIVE CHOLANGIOGRAM;  Surgeon: David Kirkland MD;  [place]  [facility] [place]  Service:     COLONOSCOPY      COLONOSCOPY  2013?    DR BRAR    COLONOSCOPY W/ POLYPECTOMY N/A 06/30/2023    Procedure: COLONOSCOPY WITH POLYPECTOMY;  Surgeon: Oleksandr Lorenzo MD;  Location: [facility] OR;  Service: Gastroenterology;  Laterality: N/A;  ascending colon polyp (cold snare)  transverse colon polyp    ENDOSCOPY N/A 12/01/2017    Procedure: ESOPHAGOGASTRODUODENOSCOPY with esophageal dilitation to 20mm,, and tissue biopsies/ polypectomies esophagus and gastric;  Surgeon: Meliza Pope MD;  Location: [facility] OR;  Service:     ENDOSCOPY N/A 06/30/2023    Procedure: ESOPHAGOGASTRODUODENOSCOPY WITH BIOPSY;  Surgeon: Oleksandr Lorenzo MD;  Location: [facility] OR;  Service: Gastroenterology;  Laterality: N/A;  gastritis-gastric biopsy  esophagitis- distal esophagus biopsy    ESOPHAGOSCOPY  06/30/2023    Procedure: ESOPHAGOSCOPY WITH DILATATION;  Surgeon: Oleksandr Lorenzo MD;  Location: [facility] OR;  Service: Gastroenterology;;  dilate to 18mm    EYE SURGERY Bilateral     LENS REPLACEMENT    FLEXIBLE SIGMOIDOSCOPY      HAND SURGERY      THUMB JOINT    HIP SURGERY Left     BONE SPURS AND CARTILIDGE    HYSTERECTOMY  2002    INGUINAL HERNIA REPAIR Left     as a child     LASER ABLATION N/A     MANDIBLE FRACTURE SURGERY      TONSILLECTOMY      TRIGGER POINT INJECTION      TUBAL ABDOMINAL LIGATION      UMBILICAL HERNIA REPAIR N/A 2020    Procedure: INCISIONAL HERNIA REPAIR WITH MESH;  Surgeon: David Kirkland MD;  [facility] [place];  Service: General;  Laterality: N/A;    UPPER GASTROINTESTINAL ENDOSCOPY N/A 2011    Normal upper endosopy - Dr. Meliza Pope        Social History     Occupational History    Not on file   Tobacco Use    Smoking status: Former     Current packs/day: 0.00     Average packs/day: 0.3 packs/day for 3.0 years (0.8 ttl pk-yrs)     Types: Cigarettes     Start date: 2001     Quit date: 2004     Years since quittin.6     Passive exposure: Past    Smokeless tobacco: Never   Vaping Use    Vaping status: Never Used   Substance and Sexual Activity    Alcohol use: Yes     Alcohol/week: 8.0 standard drinks of alcohol     Types: 7 Glasses of wine, 1 Cans of beer per week     Comment: weekly    Drug use: Never    Sexual activity: Defer      Social History     Social History Narrative    Not on file        Family History   Problem Relation Age of Onset    Thyroid disease Mother     Hypertension Mother     Arthritis Mother     Hearing loss Mother     Osteoporosis Mother     Stroke Mother     Diabetes Father     Hypertension Father     Heart disease Father     Hyperlipidemia Father     Kidney disease Father     Breast cancer Maternal Aunt     Cancer Maternal Aunt         Breast    Breast cancer Paternal Aunt     Heart disease Brother     Diabetes Brother     Anxiety disorder Daughter     Depression Daughter     Mental illness Daughter     Mental illness Daughter     Thyroid disease Daughter     Cancer Maternal Grandfather         lung -     Thyroid disease Maternal Grandmother     Rheumatologic disease Maternal Grandmother     Cancer Maternal Aunt         breast - survived    Cancer Paternal Aunt         breast -  "    Diabetes Brother     Crohn's disease Maternal Aunt     Malig Hyperthermia Neg Hx        Review of Systems      Physical Exam: 59 y.o. female  General Appearance:    Alert, cooperative, in no acute distress                      Vitals:    25 0645   BP: 125/69   Pulse: 52   Temp: 97.7 °F (36.5 °C)   TempSrc: Oral   SpO2: 96%   Weight: 89.4 kg (197 lb)   Height: 152.4 cm (60\")        Head:  Normocephalic, without obvious abnormality, atraumatic   Eyes:          Conjunctivae and sclerae normal, no pallor, corneas clear,    Ears:  Ears appear intact with no abnormalities noted   Throat: No oral lesions, no thrush, oral mucosa moist   Neck: No adenopathy, supple, trachea midline, no thyromegaly,    Back:   No kyphosis present, no scoliosis present, no skin lesions,      erythema or scars, no tenderness to percussion or                   palpation,range of motion normal   Lungs:   C respirations regular, even and                 unlabored    Heart:  Regular rhythm and normal rate               Chest Wall:  No abnormalities observed               Pulses: Pulses palpable and equal bilaterally   Skin: No bleeding, bruising or rash   Lymph nodes: No palpable adenopathy   Neurologic: Appears neurologic intact             Assessment:    Complete tear of left rotator cuff          Plan: All risks, benefits and alternatives were discussed.  Risks including but not exclusive to anesthetic complications, including death, MI, CVA, infection, bleeding DVT, PE,  fracture, residual pain and need for future surgery.  Patient understood all and agrees to proceed.              "

## 2025-02-18 NOTE — ANESTHESIA PREPROCEDURE EVALUATION
Anesthesia Evaluation     history of anesthetic complications:  PONV  NPO Solid Status: > 8 hours  NPO Liquid Status: > 2 hours           Airway   Mallampati: I  Neck ROM: full  no difficulty expected  Dental - normal exam     Pulmonary - normal exam   (+) pneumonia , a smoker Former,sleep apnea on CPAP  (-) COPD, asthma    PE comment: nonlabored  Cardiovascular - normal exam  Exercise tolerance: good (4-7 METS)    Rhythm: regular  Rate: normal    (+) hypertension, dysrhythmias PVC, hyperlipidemia  (-) valvular problems/murmurs, past MI, CAD, angina      Neuro/Psych  (+) TIA, CVA, headaches, psychiatric history Anxiety and Depression  (-) seizures  GI/Hepatic/Renal/Endo    (+) morbid obesity, hiatal hernia, GERD, thyroid problem hypothyroidism  (-) liver disease, no renal disease, diabetes    ROS Comment: Esophageal spasm    Musculoskeletal     (+) arthralgias, chronic pain, myalgias  Abdominal    Substance History   (+) alcohol use     OB/GYN          Other   arthritis,                   Anesthesia Plan    ASA 3     general     (ISB for post-op pain PSR)  intravenous induction     Anesthetic plan, risks, benefits, and alternatives have been provided, discussed and informed consent has been obtained with: patient.    CODE STATUS:

## 2025-02-18 NOTE — ANESTHESIA PROCEDURE NOTES
Peripheral Block      Patient reassessed immediately prior to procedure    Patient location during procedure: pre-op  Start time: 2/18/2025 8:08 AM  Stop time: 2/18/2025 8:14 AM  Reason for block: at surgeon's request and post-op pain management  Performed by  Anesthesiologist: Haroldo Singletary MD  Preanesthetic Checklist  Completed: patient identified, IV checked, site marked, risks and benefits discussed, surgical consent, monitors and equipment checked, pre-op evaluation and timeout performed  Prep:  Sterile barriers:cap, gloves, mask, washed/disinfected hands and alcohol skin prep  Prep: ChloraPrep  Patient monitoring: blood pressure monitoring, continuous pulse oximetry and EKG  Procedure    Sedation: yes    Guidance:ultrasound guided    ULTRASOUND INTERPRETATION.  Using ultrasound guidance a 21 G gauge needle was placed in close proximity to the brachial plexus nerve, at which point, under ultrasound guidance anesthetic was injected in the area of the nerve and spread of the anesthesia was seen on ultrasound in close proximity thereto.  There were no abnormalities seen on ultrasound; a digital image was taken; and the patient tolerated the procedure with no complications. Images:still images obtained, printed/placed on chart    Laterality:left  Block Type:interscalene  Injection Technique:single-shotNeedle Gauge:21 G  Loss of resistance: normal.    Medications Used: bupivacaine liposome (EXPAREL) 1.3 % injection - Infiltration   10 mL - 2/18/2025 8:14:00 AM  bupivacaine-EPINEPHrine PF (MARCAINE w/EPI) 0.25% -[id number] injection - Injection   10 mL - 2/18/2025 8:14:00 AM  bupivacaine (PF) (MARCAINE) 0.5 % injection - Injection   10 mL - 2/18/2025 8:14:00 AM      Medications  Comment:Ultrasound Interpretation:  Ultrasound guidance utilized for visualization of needle approach to brachial plexus at interscalene level and verification of local anesthetic disbursement to surrounding tissues. Photo printed and  placed on chart for reference.    Post Assessment  Injection Assessment: negative aspiration for heme, no paresthesia on injection and incremental injection  Patient Tolerance:comfortable throughout block  Complications:no  Performed by: Haroldo Singletary MD

## 2025-03-05 NOTE — PROGRESS NOTES
Shoulder Scope RCR follow Up first visit      Patient: Aundrea Mcgarry        YOB: 1965      Chief Complaints: shoulder pain left      History of Present Illness: Pt is here f/u shoulder arthroscopy, RCR on the left this is her first visit she states she is doing great she states it was not as bad as she thought she states she is doing better than her  did with regard to the pain she is off her pain medicine        Allergies:   Allergies   Allergen Reactions    Erythromycin Nausea And Vomiting    Lactose Intolerance (Gi) GI Intolerance    Adhesive Tape Rash       Medications:   Home Medications:  Current Outpatient Medications on File Prior to Visit   Medication Sig    acetaminophen (TYLENOL) 650 MG 8 hr tablet Take 1 tablet by mouth Every 8 (Eight) Hours As Needed for Mild Pain.    acyclovir (ZOVIRAX) 400 MG tablet Take 1 tablet by mouth 5 (Five) Times a Day. (Patient taking differently: Take 1 tablet by mouth Daily.)    aluminum hydroxide-mag carbonate (GAVISCON EXTRA RELIEF) 160-105 MG chewable tablet chewable tablet Chew 1-2 tablets As Needed.    buPROPion XL (WELLBUTRIN XL) 300 MG 24 hr tablet Take 1 tablet by mouth Every Morning.    Collagen-Vitamin C-Biotin (Collagen 1500/C) 500-50-0.8 MG capsule Take 2 Capfuls by mouth Daily. HOLD FOR SURGERY    cyclobenzaprine (FLEXERIL) 5 MG tablet Take 1-2 tablets by mouth 3 (Three) Times a Day As Needed for Muscle Spasms.    dilTIAZem (Cardizem) 30 MG tablet Take 1 tablet by mouth 3 (Three) Times a Day. (Patient taking differently: Take 1 tablet by mouth As Needed (ESOPHAGEAL SPASMS).)    escitalopram (LEXAPRO) 20 MG tablet Take 1 tablet by mouth Daily. (Patient taking differently: Take 1 tablet by mouth Every Night.)    famotidine (PEPCID) 40 MG tablet TAKE 1 TABLET BY MOUTH 2 TIMES A DAY WITH LUNCH AND DINNER (Patient taking differently: Take 1 tablet by mouth As Needed.)    gabapentin (NEURONTIN) 400 MG capsule Take 1 capsule by mouth 3 (Three)  Times a Day. (Patient taking differently: Take 1 capsule by mouth 2 (Two) Times a Day.)    levothyroxine (SYNTHROID, LEVOTHROID) 125 MCG tablet Take 1 tablet by mouth Daily.    metoprolol tartrate (LOPRESSOR) 25 MG tablet Take 1 tablet by mouth 2 (Two) Times a Day.    MULTIPLE VITAMIN PO Take 1 tablet by mouth Daily. HOLD FOR SURGERY    ondansetron (Zofran) 4 MG tablet Take 1 tablet by mouth Every 8 (Eight) Hours As Needed for Nausea or Vomiting.    oxybutynin XL (DITROPAN-XL) 5 MG 24 hr tablet Take 1 tablet by mouth Daily. (Patient taking differently: Take 1 tablet by mouth Every Night.)    oxyCODONE-acetaminophen (PERCOCET) 5-325 MG per tablet Take 1 tablet by mouth Every 4 (Four) Hours As Needed for Severe Pain.    traZODone (DESYREL) 100 MG tablet Take 1 tablet by mouth Every Night.    vitamin B-12 (CYANOCOBALAMIN) 100 MCG tablet Take 1 tablet by mouth Daily.    Vonoprazan Fumarate (Voquezna) 10 MG tablet Take 1 tablet by mouth Daily.     No current facility-administered medications on file prior to visit.     Current Medications:  Scheduled Meds:  Continuous Infusions:No current facility-administered medications for this visit.    PRN Meds:.          Physical Exam: 59 y.o. female  General Appearance:    Alert, cooperative, in no acute distress                 There were no vitals filed for this visit.   Patient is alert and oriented ×3 no acute distress normal mood physical exam.  Physical exam of the shoulder, incisions looked good there is no erythema,no signs or sx of infection.      Assessment  S/P shoulder scope, rotator cuff repair, she is doing great we removed her sutures placed Steri-Strips we reiterated restrictions and precautions we talked about the progression of physical therapy sling usage I will see her back in 4 weeks          Answers submitted by the patient for this visit:  Post Operative Visit (Submitted on 3/5/2025)  Chief Complaint: Follow-up  Pain Control: controlled  Fever: no  fever  Diet: adequate intake  Activity: moderately limited  Operative Site Issues: No

## 2025-03-06 ENCOUNTER — OFFICE VISIT (OUTPATIENT)
Dept: ORTHOPEDIC SURGERY | Facility: CLINIC | Age: 60
End: 2025-03-06
Payer: MEDICARE

## 2025-03-06 VITALS — TEMPERATURE: 98 F | WEIGHT: 198.6 LBS | BODY MASS INDEX: 38.99 KG/M2 | HEIGHT: 60 IN

## 2025-03-06 DIAGNOSIS — Z98.890 S/P ROTATOR CUFF REPAIR: Primary | ICD-10-CM

## 2025-03-07 NOTE — PROGRESS NOTES
Left Shoulder Scope/ RCR Follow Up       Patient: Aundrea Mcgarry        YOB: 1965      Chief Complaints: shoulder pain bilateral      History of Present Illness: Pt is here f/u shoulder arthroscopy rotator cuff repair on the left she is progressing well with that she is minding her restrictions she is already out of her sling would do that today normally.  She is minding her restrictions her right arm is bothering her some just for protecting the left I injected the right 1 in the past      Allergies:   Allergies   Allergen Reactions    Erythromycin Nausea And Vomiting    Lactose Intolerance (Gi) GI Intolerance    Adhesive Tape Rash       Medications:   Home Medications:  Current Outpatient Medications on File Prior to Visit   Medication Sig    acetaminophen (TYLENOL) 650 MG 8 hr tablet Take 1 tablet by mouth Every 8 (Eight) Hours As Needed for Mild Pain.    acyclovir (ZOVIRAX) 400 MG tablet Take 1 tablet by mouth 5 (Five) Times a Day.    aluminum hydroxide-mag carbonate (GAVISCON EXTRA RELIEF) 160-105 MG chewable tablet chewable tablet Chew 1-2 tablets As Needed.    buPROPion XL (WELLBUTRIN XL) 300 MG 24 hr tablet Take 1 tablet by mouth Every Morning.    Collagen-Vitamin C-Biotin (Collagen 1500/C) 500-50-0.8 MG capsule Take 2 Capfuls by mouth Daily. HOLD FOR SURGERY    cyclobenzaprine (FLEXERIL) 5 MG tablet Take 1-2 tablets by mouth 3 (Three) Times a Day As Needed for Muscle Spasms.    dilTIAZem (Cardizem) 30 MG tablet Take 1 tablet by mouth 3 (Three) Times a Day. (Patient taking differently: Take 1 tablet by mouth As Needed (ESOPHAGEAL SPASMS).)    escitalopram (LEXAPRO) 20 MG tablet Take 1 tablet by mouth Daily. (Patient taking differently: Take 1 tablet by mouth Every Night.)    famotidine (PEPCID) 40 MG tablet TAKE 1 TABLET BY MOUTH 2 TIMES A DAY WITH LUNCH AND DINNER (Patient taking differently: Take 1 tablet by mouth As Needed.)    gabapentin (NEURONTIN) 400 MG capsule Take 1 capsule by mouth  3 (Three) Times a Day. (Patient taking differently: Take 1 capsule by mouth 2 (Two) Times a Day.)    levothyroxine (SYNTHROID, LEVOTHROID) 125 MCG tablet Take 1 tablet by mouth Daily.    metoprolol tartrate (LOPRESSOR) 25 MG tablet Take 1 tablet by mouth 2 (Two) Times a Day.    MULTIPLE VITAMIN PO Take 1 tablet by mouth Daily. HOLD FOR SURGERY    ondansetron (Zofran) 4 MG tablet Take 1 tablet by mouth Every 8 (Eight) Hours As Needed for Nausea or Vomiting.    oxybutynin XL (DITROPAN-XL) 5 MG 24 hr tablet Take 1 tablet by mouth Daily. (Patient taking differently: Take 1 tablet by mouth Every Night.)    oxyCODONE-acetaminophen (PERCOCET) 5-325 MG per tablet Take 1 tablet by mouth Every 4 (Four) Hours As Needed for Severe Pain. (Patient not taking: Reported on 3/6/2025)    traZODone (DESYREL) 100 MG tablet Take 1 tablet by mouth Every Night.    vitamin B-12 (CYANOCOBALAMIN) 100 MCG tablet Take 1 tablet by mouth Daily.    Vonoprazan Fumarate (Voquezna) 10 MG tablet Take 1 tablet by mouth Daily.     No current facility-administered medications on file prior to visit.     Current Medications:  Scheduled Meds:  Continuous Infusions:No current facility-administered medications for this visit.    PRN Meds:.          Physical Exam: 59 y.o. female  General Appearance:    Alert, cooperative, in no acute distress                 There were no vitals filed for this visit.   Patient is alert and oriented ×3 no acute distress normal mood physical exam.  Physical exam of the shoulder, incisions looked good there is no erythema,no signs or sx of infection.  Exam of the left passive range of motion is only to about 1 40X rotation to 30 rotator cuff strength 4+/5    Physical exam of the right shoulder reveals no overlying skin changes no lymphedema no lymphadenopathy.  Patient has active flexion 180 with mild symptoms abduction is similar external rotation is to 50 and internal rotation to the upper lumbar spine with mild symptoms.   Patient has good rotator cuff strength 4+ over 5 with isometric strength testing with pain.  Patient has a positive impingement and a positive Love sign.  Patient has good cervical range of motion which is full and asymptomatic no radicular symptoms.  Patient has a normal elbow exam.  Good distal pulses are present  Patient has pain with overhead activity and a positive Neer sign and a positive empty can sign , a positive drop arm and a definitive painful arc   Assessment  S/P shoulder scope.  Rotator cuff repair I think she is doing well I want her to continue to work on passive range of motion which we did review no active range of motion until 12 weeks I think she can drive as far as the right when goes I think it is irritation rotator cuff from overuse and protecting the left plan is to proceed with an injection      Large Joint Arthrocentesis: R subacromial bursa  Date/Time: 4/3/2025 10:08 AM  Consent given by: patient  Site marked: site marked  Timeout: Immediately prior to procedure a time out was called to verify the correct patient, procedure, equipment, support staff and site/side marked as required   Supporting Documentation  Indications: pain   Procedure Details  Location: shoulder - R subacromial bursa  Preparation: Patient was prepped and draped in the usual sterile fashion  Needle gauge: 21 G.  Approach: posterior  Medications administered: 2 mL lidocaine PF 1% 1 %; 80 mg methylPREDNISolone acetate 80 MG/ML  Patient tolerance: patient tolerated the procedure well with no immediate complications           Plan: Continue Physical Therapy. I reiterated restrictions                  Answers submitted by the patient for this visit:  Post Operative Visit (Submitted on 4/1/2025)  Chief Complaint: Follow-up  Pain Control: controlled  Fever: no fever  Diet: adequate intake  Activity: returning to normal  Operative Site Issues: No  Additional information: Began PT on 3-21-25

## 2025-04-03 ENCOUNTER — OFFICE VISIT (OUTPATIENT)
Dept: ORTHOPEDIC SURGERY | Facility: CLINIC | Age: 60
End: 2025-04-03
Payer: MEDICARE

## 2025-04-03 VITALS — HEIGHT: 60 IN | TEMPERATURE: 97.5 F | WEIGHT: 197.6 LBS | BODY MASS INDEX: 38.79 KG/M2

## 2025-04-03 DIAGNOSIS — M75.41 IMPINGEMENT SYNDROME OF RIGHT SHOULDER: ICD-10-CM

## 2025-04-03 DIAGNOSIS — Z98.890 S/P ROTATOR CUFF REPAIR: Primary | ICD-10-CM

## 2025-04-03 RX ORDER — LIDOCAINE HYDROCHLORIDE 10 MG/ML
2 INJECTION, SOLUTION EPIDURAL; INFILTRATION; INTRACAUDAL; PERINEURAL
Status: COMPLETED | OUTPATIENT
Start: 2025-04-03 | End: 2025-04-03

## 2025-04-03 RX ORDER — METHYLPREDNISOLONE ACETATE 80 MG/ML
80 INJECTION, SUSPENSION INTRA-ARTICULAR; INTRALESIONAL; INTRAMUSCULAR; SOFT TISSUE
Status: COMPLETED | OUTPATIENT
Start: 2025-04-03 | End: 2025-04-03

## 2025-04-03 RX ADMIN — METHYLPREDNISOLONE ACETATE 80 MG: 80 INJECTION, SUSPENSION INTRA-ARTICULAR; INTRALESIONAL; INTRAMUSCULAR; SOFT TISSUE at 10:08

## 2025-04-03 RX ADMIN — LIDOCAINE HYDROCHLORIDE 2 ML: 10 INJECTION, SOLUTION EPIDURAL; INFILTRATION; INTRACAUDAL; PERINEURAL at 10:08

## 2025-05-07 NOTE — PROGRESS NOTES
Shoulder Scope RCR follow Up       Patient: Aundrea Mcgarry        YOB: 1965      Chief Complaints: shoulder pain left      History of Present Illness: Pt is here f/u shoulder arthroscopy, RCR on the left she is doing well she is about 8 weeks out now minding her restrictions overall doing well having some achiness but overall improving        Allergies:   Allergies   Allergen Reactions    Erythromycin Nausea And Vomiting    Lactose Intolerance (Gi) GI Intolerance    Adhesive Tape Rash       Medications:   Home Medications:  Current Outpatient Medications on File Prior to Visit   Medication Sig    acetaminophen (TYLENOL) 650 MG 8 hr tablet Take 1 tablet by mouth Every 8 (Eight) Hours As Needed for Mild Pain.    acyclovir (ZOVIRAX) 400 MG tablet Take 1 tablet by mouth 5 (Five) Times a Day.    aluminum hydroxide-mag carbonate (GAVISCON EXTRA RELIEF) 160-105 MG chewable tablet chewable tablet Chew 1-2 tablets As Needed.    buPROPion XL (WELLBUTRIN XL) 300 MG 24 hr tablet Take 1 tablet by mouth Every Morning.    Collagen-Vitamin C-Biotin (Collagen 1500/C) 500-50-0.8 MG capsule Take 2 Capfuls by mouth Daily. HOLD FOR SURGERY    cyclobenzaprine (FLEXERIL) 5 MG tablet Take 1-2 tablets by mouth 3 (Three) Times a Day As Needed for Muscle Spasms.    dilTIAZem (Cardizem) 30 MG tablet Take 1 tablet by mouth 3 (Three) Times a Day. (Patient taking differently: Take 1 tablet by mouth As Needed (ESOPHAGEAL SPASMS).)    escitalopram (LEXAPRO) 20 MG tablet Take 1 tablet by mouth Daily. (Patient taking differently: Take 1 tablet by mouth Every Night.)    famotidine (PEPCID) 40 MG tablet TAKE 1 TABLET BY MOUTH 2 TIMES A DAY WITH LUNCH AND DINNER (Patient taking differently: Take 1 tablet by mouth As Needed.)    gabapentin (NEURONTIN) 400 MG capsule Take 1 capsule by mouth 3 (Three) Times a Day. (Patient taking differently: Take 1 capsule by mouth 2 (Two) Times a Day.)    levothyroxine (SYNTHROID, LEVOTHROID) 125 MCG  tablet Take 1 tablet by mouth Daily.    metoprolol tartrate (LOPRESSOR) 25 MG tablet Take 1 tablet by mouth 2 (Two) Times a Day.    MULTIPLE VITAMIN PO Take 1 tablet by mouth Daily. HOLD FOR SURGERY    ondansetron (Zofran) 4 MG tablet Take 1 tablet by mouth Every 8 (Eight) Hours As Needed for Nausea or Vomiting.    oxybutynin XL (DITROPAN-XL) 5 MG 24 hr tablet Take 1 tablet by mouth Daily. (Patient taking differently: Take 1 tablet by mouth Every Night.)    oxyCODONE-acetaminophen (PERCOCET) 5-325 MG per tablet Take 1 tablet by mouth Every 4 (Four) Hours As Needed for Severe Pain. (Patient not taking: Reported on 4/3/2025)    traZODone (DESYREL) 100 MG tablet Take 1 tablet by mouth Every Night.    vitamin B-12 (CYANOCOBALAMIN) 100 MCG tablet Take 1 tablet by mouth Daily.    Vonoprazan Fumarate (Voquezna) 10 MG tablet Take 1 tablet by mouth Daily.     No current facility-administered medications on file prior to visit.     Current Medications:  Scheduled Meds:  Continuous Infusions:No current facility-administered medications for this visit.    PRN Meds:.          Physical Exam: 59 y.o. female  General Appearance:    Alert, cooperative, in no acute distress                 There were no vitals filed for this visit.   Patient is alert and oriented ×3 no acute distress normal mood physical exam.  Physical exam of the shoulder, incisions looked good there is no erythema,no signs or sx of infection.  Passive flexion is to 180 external rotation to 50 rotator cuff strength 4+/5    Assessment  S/P shoulder scope, rotator cuff repair, I think she is doing well I think she is right on schedule we reiterated restrictions and precautions she will continue therapy I will see her back in 6 weeks          Answers submitted by the patient for this visit:  Post Operative Visit (Submitted on 5/14/2025)  Chief Complaint: Follow-up  Pain Control: partially controlled  Fever: no fever  Diet: adequate intake  Activity: returning to  normal  Operative Site Issues: No  Additional information: continuing passive PT at  [facility] in [place]

## 2025-05-15 ENCOUNTER — OFFICE VISIT (OUTPATIENT)
Dept: ORTHOPEDIC SURGERY | Facility: CLINIC | Age: 60
End: 2025-05-15
Payer: MEDICARE

## 2025-05-15 VITALS — WEIGHT: 197 LBS | HEIGHT: 60 IN | TEMPERATURE: 98 F | BODY MASS INDEX: 38.68 KG/M2

## 2025-05-15 DIAGNOSIS — Z98.890 S/P ROTATOR CUFF REPAIR: Primary | ICD-10-CM

## 2025-05-21 DIAGNOSIS — E03.9 HYPOTHYROIDISM, UNSPECIFIED TYPE: Primary | ICD-10-CM

## 2025-06-04 NOTE — PROGRESS NOTES
Patient: Aundrea Mcgarry  YOB: 1965  Date of Service: 6/4/2025    Chief Complaints: Left shoulder pain    Subjective:    History of Present Illness: Pt is seen in the office today with complaints of left shoulder pain she is status post rotator cuff repair she is about 4 months out overall doing better she is progressing her activities her pain is much better        Allergies:   Allergies   Allergen Reactions    Erythromycin Nausea And Vomiting    Lactose Intolerance (Gi) GI Intolerance    Adhesive Tape Rash       Medications:   Home Medications:  Current Outpatient Medications on File Prior to Visit   Medication Sig    acetaminophen (TYLENOL) 650 MG 8 hr tablet Take 1 tablet by mouth Every 8 (Eight) Hours As Needed for Mild Pain.    acyclovir (ZOVIRAX) 400 MG tablet Take 1 tablet by mouth 5 (Five) Times a Day.    aluminum hydroxide-mag carbonate (GAVISCON EXTRA RELIEF) 160-105 MG chewable tablet chewable tablet Chew 1-2 tablets As Needed.    buPROPion XL (WELLBUTRIN XL) 300 MG 24 hr tablet Take 1 tablet by mouth Every Morning.    Collagen-Vitamin C-Biotin (Collagen 1500/C) 500-50-0.8 MG capsule Take 2 Capfuls by mouth Daily. HOLD FOR SURGERY    cyclobenzaprine (FLEXERIL) 5 MG tablet Take 1-2 tablets by mouth 3 (Three) Times a Day As Needed for Muscle Spasms.    dilTIAZem (Cardizem) 30 MG tablet Take 1 tablet by mouth 3 (Three) Times a Day. (Patient taking differently: Take 1 tablet by mouth As Needed (ESOPHAGEAL SPASMS).)    escitalopram (LEXAPRO) 20 MG tablet Take 1 tablet by mouth Daily. (Patient taking differently: Take 1 tablet by mouth Every Night.)    famotidine (PEPCID) 40 MG tablet TAKE 1 TABLET BY MOUTH 2 TIMES A DAY WITH LUNCH AND DINNER (Patient taking differently: Take 1 tablet by mouth As Needed.)    gabapentin (NEURONTIN) 400 MG capsule Take 1 capsule by mouth 3 (Three) Times a Day. (Patient taking differently: Take 1 capsule by mouth 2 (Two) Times a Day.)    levothyroxine  (SYNTHROID, LEVOTHROID) 125 MCG tablet Take 1 tablet by mouth Daily.    metoprolol tartrate (LOPRESSOR) 25 MG tablet Take 1 tablet by mouth 2 (Two) Times a Day.    MULTIPLE VITAMIN PO Take 1 tablet by mouth Daily. HOLD FOR SURGERY    ondansetron (Zofran) 4 MG tablet Take 1 tablet by mouth Every 8 (Eight) Hours As Needed for Nausea or Vomiting.    oxybutynin XL (DITROPAN-XL) 5 MG 24 hr tablet Take 1 tablet by mouth Daily. (Patient taking differently: Take 1 tablet by mouth Every Night.)    oxyCODONE-acetaminophen (PERCOCET) 5-325 MG per tablet Take 1 tablet by mouth Every 4 (Four) Hours As Needed for Severe Pain. (Patient not taking: Reported on 3/6/2025)    traZODone (DESYREL) 100 MG tablet Take 1 tablet by mouth Every Night.    vitamin B-12 (CYANOCOBALAMIN) 100 MCG tablet Take 1 tablet by mouth Daily.    Vonoprazan Fumarate (Voquezna) 10 MG tablet Take 1 tablet by mouth Daily.     No current facility-administered medications on file prior to visit.     Current Medications:  Scheduled Meds:  Continuous Infusions:No current facility-administered medications for this visit.    PRN Meds:.    I have reviewed the patient's medical history in detail and updated the computerized patient record.  Review and summarization of old records include:    Past Medical History:   Diagnosis Date    Allergic     Erythromycin    Anxiety and depression     Arthritis     Arthritis of neck     Cervical disc disorder 2024    Cholelithiasis 2017    Colon polyp 2023    CTS (carpal tunnel syndrome)     Deviated septum     Diverticulosis     Elevated cholesterol     Esophageal spasm     Esophageal varices     Spasms    Fibromyalgia     Fibromyalgia, primary     Frozen shoulder     GERD (gastroesophageal reflux disease)     Headache 2019    Hearing loss     NO AIDS    Hip arthrosis     Hypertension     Hypothyroidism     IBS (irritable bowel syndrome)     Knee swelling     Lactose intolerance     Nausea     chronic    Overactive bladder      Periarthritis of shoulder     Pneumonia     PONV (postoperative nausea and vomiting)     Pulmonary fibrosis     PVC (premature ventricular contraction)     Rotator cuff syndrome     Seasonal allergies     Shoulder pain     LEFT    Sleep apnea     WEARS CPAP    Stroke     TIA    Thoracic disc disorder     Umbilical hernia         Past Surgical History:   Procedure Laterality Date    BREAST BIOPSY Right early 90's    BUNIONECTOMY Right 09/30/2022    CARPAL TUNNEL RELEASE  11/2021    Dr. Das    CHOLECYSTECTOMY  2017    CHOLECYSTECTOMY WITH INTRAOPERATIVE CHOLANGIOGRAM N/A 06/30/2017    Procedure: CHOLECYSTECTOMY LAPAROSCOPIC INTRAOPERATIVE CHOLANGIOGRAM;  Surgeon: David Kirkland MD;  Location:  [facility];  Service:     COLONOSCOPY      COLONOSCOPY  2013?    DR BRAR    COLONOSCOPY W/ POLYPECTOMY N/A 06/30/2023    Procedure: COLONOSCOPY WITH POLYPECTOMY;  Surgeon: Oleksandr Lorenzo MD;  Location: [facility];  Service: Gastroenterology;  Laterality: N/A;  ascending colon polyp (cold snare)  transverse colon polyp    ENDOSCOPY N/A 12/01/2017    Procedure: ESOPHAGOGASTRODUODENOSCOPY with esophageal dilitation to 20mm,, and tissue biopsies/ polypectomies esophagus and gastric;  Surgeon: Meliza Pope MD;  Location: [facility];  Service:     ENDOSCOPY N/A 06/30/2023    Procedure: ESOPHAGOGASTRODUODENOSCOPY WITH BIOPSY;  Surgeon: Oleksandr Lorenzo MD;  Location: [facility];  Service: Gastroenterology;  Laterality: N/A;  gastritis-gastric biopsy  esophagitis- distal esophagus biopsy    ESOPHAGOSCOPY  06/30/2023    Procedure: ESOPHAGOSCOPY WITH DILATATION;  Surgeon: Oleksandr Lorenzo MD;  Location: [facility];  Service: Gastroenterology;;  dilate to 18mm    EYE SURGERY Bilateral     LENS REPLACEMENT    FLEXIBLE SIGMOIDOSCOPY      HAND SURGERY      THUMB JOINT    HIP SURGERY Left     BONE SPURS AND CARTILIDGE    HYSTERECTOMY  2002    INGUINAL HERNIA REPAIR Left     as a child    LASER  ABLATION N/A 2002    MANDIBLE FRACTURE SURGERY      SHOULDER ARTHROSCOPY W/ ROTATOR CUFF REPAIR Left 2025    Procedure: LEFT SHOULDER ARTHROSCOPY, LABRAL DEBRIDEMENT, DECOMPRESSION,  WITH MINI OPEN  ROTATOR CUFF REPAIR;  Surgeon: Teri Olmos MD;  Location: [facility];  Service: Orthopedics;  Laterality: Left;    SHOULDER SURGERY      SUBTOTAL HYSTERECTOMY      TONSILLECTOMY      TRIGGER POINT INJECTION      TUBAL ABDOMINAL LIGATION      UMBILICAL HERNIA REPAIR N/A 2020    Procedure: INCISIONAL HERNIA REPAIR WITH MESH;  Surgeon: David Kirkland MD;  Location: [facility];  Service: General;  Laterality: N/A;    UPPER GASTROINTESTINAL ENDOSCOPY N/A 2011    Normal upper endosopy - Dr. Meliza Pope    WRIST SURGERY      BILATERAL CTR        Social History     Occupational History    Not on file   Tobacco Use    Smoking status: Former     Current packs/day: 0.00     Average packs/day: 0.3 packs/day for 3.3 years (0.8 ttl pk-yrs)     Types: Cigarettes     Start date: 2001     Quit date: 2004     Years since quittin.9     Passive exposure: Past    Smokeless tobacco: Never   Vaping Use    Vaping status: Never Used   Substance and Sexual Activity    Alcohol use: Yes     Alcohol/week: 8.0 standard drinks of alcohol     Types: 7 Glasses of wine, 1 Cans of beer per week     Comment: weekly    Drug use: Never    Sexual activity: Defer      Social History     Social History Narrative    Not on file        Family History   Problem Relation Age of Onset    Thyroid disease Mother     Hypertension Mother     Arthritis Mother     Hearing loss Mother     Osteoporosis Mother     Stroke Mother     Obesity Mother     Diabetes Father     Hypertension Father     Heart disease Father     Hyperlipidemia Father     Kidney disease Father     Other Father         CHF -     Obesity Father     Sleep apnea Father     Breast cancer Maternal Aunt     Cancer Maternal Aunt          Breast    Breast cancer Paternal Aunt     Heart disease Brother     Diabetes Brother     Obesity Brother     Anxiety disorder Daughter     Depression Daughter     Mental illness Daughter     Mental illness Daughter     Thyroid disease Daughter     Cancer Maternal Grandfather         lung -     Osteoporosis Maternal Grandfather     Thyroid disease Maternal Grandmother     Rheumatologic disease Maternal Grandmother     Osteoporosis Maternal Grandmother     Obesity Maternal Grandmother     Arthritis Maternal Grandmother     Cancer Maternal Aunt         breast - survived    Cancer Paternal Aunt         breast -     Diabetes Brother     Obesity Brother     Crohn's disease Maternal Aunt     Obesity Paternal Grandmother     Anxiety disorder Daughter     Depression Daughter     Mental illness Daughter     Mental illness Daughter     Thyroid disease Daughter     Malig Hyperthermia Neg Hx        ROS: 14 point review of systems was performed and was negative except for documented findings in HPI and today's encounter.     Allergies:   Allergies   Allergen Reactions    Erythromycin Nausea And Vomiting    Lactose Intolerance (Gi) GI Intolerance    Adhesive Tape Rash     Constitutional:  Denies fever, shaking or chills   Eyes:  Denies change in visual acuity   HENT:  Denies nasal congestion or sore throat   Respiratory:  Denies cough or shortness of breath   Cardiovascular:  Denies chest pain or severe LE edema   GI:  Denies abdominal pain, nausea, vomiting, bloody stools or diarrhea   Musculoskeletal:  Numbness, tingling, or loss of motor function only as noted above in history of present illness.  : Denies painful urination or hematuria  Integument:  Denies rash, lesion or ulceration   Neurologic:  Denies headache or focal weakness  Endocrine:  Denies lymphadenopathy  Psych:  Denies confusion or change in mental status   Hem:  Denies active bleeding      Physical Exam: 59 y.o. female  Wt Readings from Last 3  Encounters:   05/15/25 89.4 kg (197 lb)   04/03/25 89.6 kg (197 lb 9.6 oz)   03/06/25 90.1 kg (198 lb 9.6 oz)       There is no height or weight on file to calculate BMI.    There were no vitals filed for this visit.  Vital signs reviewed.   General Appearance:    Alert, cooperative, in no acute distress                    Ortho exam    Physical exam of the left shoulder reveals no overlying skin changes no lymphedema no lymphadenopathy.  Patient has active flexion 180 with mild symptoms abduction is similar external rotation is to 50 and internal rotation to the upper lumbar spine with mild symptoms.  Patient has good rotator cuff strength 4+ over 5 with isometric strength testing with pain.  Patient has a positive impingement and a positive Love sign.  Patient has good cervical range of motion which is full and asymptomatic no radicular symptoms.  Patient has a normal elbow exam.  Good distal pulses are presentPatient has pain with overhead activity and a positive Neer sign and a positive empty can sign  They have a positive drop arm any definitive painful arc              Assessment: Status post rotator cuff repair I think she is right on schedule I still want her to limit what she does but not lift anything heavy overhead away from her body she will follow protocol    Plan: As above we will see her back in 6 weeks  Follow up as indicated.  Ice, elevate, and rest as needed.  Discussed conservative measures of pain control including ice, bracing.  Teri Olmos M.D.

## 2025-06-10 ENCOUNTER — HOSPITAL ENCOUNTER (OUTPATIENT)
Dept: GENERAL RADIOLOGY | Facility: HOSPITAL | Age: 60
Discharge: HOME | End: 2025-06-10
Payer: MEDICARE

## 2025-06-10 DIAGNOSIS — M25.552 PAIN OF LEFT HIP: ICD-10-CM

## 2025-06-10 PROCEDURE — 73502 X-RAY EXAM HIP UNI 2-3 VIEWS: CPT

## 2025-06-16 DIAGNOSIS — Z12.31 ENCOUNTER FOR SCREENING MAMMOGRAM FOR MALIGNANT NEOPLASM OF BREAST: Primary | ICD-10-CM

## 2025-06-19 ENCOUNTER — OFFICE VISIT (OUTPATIENT)
Dept: ORTHOPEDIC SURGERY | Facility: CLINIC | Age: 60
End: 2025-06-19
Payer: MEDICARE

## 2025-06-19 VITALS — WEIGHT: 197 LBS | HEIGHT: 60 IN | TEMPERATURE: 98.3 F | BODY MASS INDEX: 38.68 KG/M2

## 2025-06-19 DIAGNOSIS — Z98.890 S/P ROTATOR CUFF REPAIR: Primary | ICD-10-CM

## 2025-06-24 ENCOUNTER — OFFICE VISIT (OUTPATIENT)
Dept: ENDOCRINOLOGY | Age: 60
End: 2025-06-24
Payer: MEDICARE

## 2025-06-24 VITALS
BODY MASS INDEX: 38.64 KG/M2 | OXYGEN SATURATION: 95 % | HEART RATE: 59 BPM | SYSTOLIC BLOOD PRESSURE: 130 MMHG | TEMPERATURE: 97.8 F | DIASTOLIC BLOOD PRESSURE: 70 MMHG | HEIGHT: 60 IN | WEIGHT: 196.8 LBS

## 2025-06-24 DIAGNOSIS — E03.9 HYPOTHYROIDISM, UNSPECIFIED TYPE: Primary | ICD-10-CM

## 2025-06-24 DIAGNOSIS — E66.09 CLASS 2 OBESITY DUE TO EXCESS CALORIES WITHOUT SERIOUS COMORBIDITY WITH BODY MASS INDEX (BMI) OF 38.0 TO 38.9 IN ADULT: ICD-10-CM

## 2025-06-24 DIAGNOSIS — E66.812 CLASS 2 OBESITY DUE TO EXCESS CALORIES WITHOUT SERIOUS COMORBIDITY WITH BODY MASS INDEX (BMI) OF 38.0 TO 38.9 IN ADULT: ICD-10-CM

## 2025-06-24 PROCEDURE — 99214 OFFICE O/P EST MOD 30 MIN: CPT | Performed by: NURSE PRACTITIONER

## 2025-06-24 NOTE — PROGRESS NOTES
"Chief Complaint  Hypothyroidism    Subjective        Aundrea Mcgarry presents to [facility] ENDOCRINOLOGY  History of Present Illness    Patient seen in consult for E03.9 (ICD-10-CM) - Hypothyroidism, unspecified type sent by Spenser Hodgson MD     Diagnosed: 1985, goiter, hormone started at that time to \" shut down the thyroid\"  Strong family history of thyroid disease   Has been under more stress recently   Autoimmune: unknown   Current treatment: levothyroxine 125mcg QD  Previous treatment: levothyroxine 112mcg QD 2023  Imaging: many years ago,   Concerns: lethargy, not interested in anything, lacking motivation, brain fog, lack of focus, increased abdominal fat,    PMH: ANNA with CPAP, fibromyalgia, anxiety and depression treated with bupropion and escitalopram, mac degen, esophageal spasms, on BB for PVCs- more frequent, IBS, arthralgias     Wanting to get back into chair yoga  She is an artist     Objective   Vital Signs:  /70   Pulse 59   Temp 97.8 °F (36.6 °C) (Oral)   Ht 152.4 cm (60\")   Wt 89.3 kg (196 lb 12.8 oz)   SpO2 95%   BMI 38.43 kg/m²   Estimated body mass index is 38.43 kg/m² as calculated from the following:    Height as of this encounter: 152.4 cm (60\").    Weight as of this encounter: 89.3 kg (196 lb 12.8 oz).            Physical Exam  Vitals reviewed.   Constitutional:       General: She is not in acute distress.  HENT:      Head: Normocephalic and atraumatic.   Cardiovascular:      Rate and Rhythm: Normal rate.   Pulmonary:      Effort: Pulmonary effort is normal. No respiratory distress.   Musculoskeletal:         General: No signs of injury. Normal range of motion.      Cervical back: Normal range of motion and neck supple.   Skin:     General: Skin is warm and dry.   Neurological:      Mental Status: She is alert and oriented to person, place, and time. Mental status is at baseline.   Psychiatric:         Mood and Affect: Mood normal.         Behavior: " Behavior normal.         Thought Content: Thought content normal.         Judgment: Judgment normal.        Result Review :  The following data was reviewed by: RALEIGH Hope on 06/24/2025:  Common labs          12/17/2024    13:02 12/17/2024    13:03 2/13/2025    10:08   Common Labs   Glucose  93  94    BUN  8  11    Creatinine  0.75  0.79    Sodium  138  135    Potassium  3.9  3.9    Chloride  104  100    Calcium  9.1  9.3    Albumin  4.1     Total Bilirubin  0.3     Alkaline Phosphatase  87     AST (SGOT)  21     ALT (SGPT)  20     WBC  5.45  6.61    Hemoglobin  14.1  14.0    Hematocrit  40.0  40.7    Platelets  248  236    Total Cholesterol 216      Triglycerides 128      HDL Cholesterol 50      LDL Cholesterol  143      Hemoglobin A1C 4.90                  Assessment and Plan   Diagnoses and all orders for this visit:    1. Hypothyroidism, unspecified type (Primary)  -     TSH  -     T3, Free  -     T4, Free  -     Thyroid Peroxidase Antibody  -     US Thyroid; Future    2. Class 2 obesity due to excess calories without serious comorbidity with body mass index (BMI) of 38.0 to 38.9 in adult  -     Ambulatory Referral to Nutrition Services           I spent 33 minutes caring for Aundrea on this date of service. This time includes time spent by me in the following activities:preparing for the visit, reviewing tests, obtaining and/or reviewing a separately obtained history, performing a medically appropriate examination and/or evaluation , counseling and educating the patient/family/caregiver, ordering medications, tests, or procedures, and documenting information in the medical record    Follow Up   No follow-ups on file.    Thyroid labs today to assess dose needed and change to branded thyroid medication  Get thyroid u/s UTD  Nutrition counseling with frustration with abdominal weight gain despite reports of healthy eating habits  Unsure if symptoms directly thyroid related but given recent changes, will  attempt to optimize thyroid management with brand medication and dose adjustments as needed and reassess improvements to above concerns     Patient was given instructions and counseling regarding her condition or for health maintenance advice. Please see specific information pulled into the AVS if appropriate.       RALEIGH Hope

## 2025-06-25 LAB
T3FREE SERPL-MCNC: 2.3 PG/ML (ref 2–4.4)
T4 FREE SERPL-MCNC: 1.63 NG/DL (ref 0.92–1.68)
THYROPEROXIDASE AB SERPL-ACNC: 11 IU/ML (ref 0–34)
TSH SERPL DL<=0.005 MIU/L-ACNC: 5.52 UIU/ML (ref 0.27–4.2)

## 2025-06-26 ENCOUNTER — PATIENT ROUNDING (BHMG ONLY) (OUTPATIENT)
Dept: ENDOCRINOLOGY | Age: 60
End: 2025-06-26
Payer: MEDICARE

## 2025-06-26 ENCOUNTER — RESULTS FOLLOW-UP (OUTPATIENT)
Dept: ENDOCRINOLOGY | Age: 60
End: 2025-06-26
Payer: MEDICARE

## 2025-06-26 RX ORDER — LEVOTHYROXINE SODIUM 137 UG/1
137 TABLET ORAL
Qty: 30 TABLET | Refills: 5 | Status: SHIPPED | OUTPATIENT
Start: 2025-06-26

## 2025-07-01 ENCOUNTER — OFFICE VISIT (OUTPATIENT)
Dept: ORTHOPEDIC SURGERY | Facility: CLINIC | Age: 60
End: 2025-07-01
Payer: MEDICARE

## 2025-07-01 VITALS — WEIGHT: 196.6 LBS | BODY MASS INDEX: 28.15 KG/M2 | HEIGHT: 70 IN | TEMPERATURE: 97.9 F

## 2025-07-01 DIAGNOSIS — M76.892 HIP TENDONITIS, LEFT: ICD-10-CM

## 2025-07-01 DIAGNOSIS — S76.012A STRAIN OF LEFT HIP, INITIAL ENCOUNTER: ICD-10-CM

## 2025-07-01 DIAGNOSIS — M70.62 TROCHANTERIC BURSITIS OF LEFT HIP: Primary | ICD-10-CM

## 2025-07-01 DIAGNOSIS — M16.12 PRIMARY OSTEOARTHRITIS OF LEFT HIP: ICD-10-CM

## 2025-07-01 RX ORDER — LIDOCAINE HYDROCHLORIDE 10 MG/ML
2 INJECTION, SOLUTION EPIDURAL; INFILTRATION; INTRACAUDAL; PERINEURAL
Status: COMPLETED | OUTPATIENT
Start: 2025-07-01 | End: 2025-07-01

## 2025-07-01 RX ORDER — METHYLPREDNISOLONE ACETATE 80 MG/ML
80 INJECTION, SUSPENSION INTRA-ARTICULAR; INTRALESIONAL; INTRAMUSCULAR; SOFT TISSUE
Status: COMPLETED | OUTPATIENT
Start: 2025-07-01 | End: 2025-07-01

## 2025-07-01 RX ADMIN — LIDOCAINE HYDROCHLORIDE 2 ML: 10 INJECTION, SOLUTION EPIDURAL; INFILTRATION; INTRACAUDAL; PERINEURAL at 15:07

## 2025-07-01 RX ADMIN — METHYLPREDNISOLONE ACETATE 80 MG: 80 INJECTION, SUSPENSION INTRA-ARTICULAR; INTRALESIONAL; INTRAMUSCULAR; SOFT TISSUE at 15:07

## 2025-07-01 NOTE — PROGRESS NOTES
General Exam    Patient: Aundrea Mcgarry    YOB: 1965    Medical Record Number: [id number]    Chief Complaints: Left hip pain    History of Present Illness:     59 y.o. female patient who presents for evaluation left hip pain.  Patient states she noticed more hip discomfort 1 June getting out of her truck as she stepped funny getting onto her he had been doing errands that day and noticed increased symptoms.  She reports majority of her pain is laterally based about the hip and thigh region.  Occasionally has some groin discomfort but that has been on and off for some time however that feels a little more aggravated since this recent event.  In 2007 she did have a hip scope done by Dr. Sebastina in [place].  Patient denies radiating pain down the leg but occasionally does feel some pain generalized about the hip and thigh region since this event.  Does have trouble taking oral anti-inflammatory medication.    Denies any numbness or tingling.  Denies any fevers, cough or shortness of breath.    Allergies:   Allergies   Allergen Reactions    Erythromycin Nausea And Vomiting    Lactose Intolerance (Gi) GI Intolerance    Adhesive Tape Rash       Home Medications:      Current Outpatient Medications:     acyclovir (ZOVIRAX) 400 MG tablet, Take 1 tablet by mouth 5 (Five) Times a Day. (Patient taking differently: Take 1 tablet by mouth Daily.), Disp: 150 tablet, Rfl: 2    aluminum hydroxide-mag carbonate (GAVISCON EXTRA RELIEF) 160-105 MG chewable tablet chewable tablet, Chew 1-2 tablets As Needed., Disp: , Rfl:     buPROPion XL (WELLBUTRIN XL) 300 MG 24 hr tablet, Take 1 tablet by mouth Every Morning., Disp: 90 tablet, Rfl: 1    Collagen-Vitamin C-Biotin (Collagen 1500/C) 500-50-0.8 MG capsule, Take 2 Capfuls by mouth Daily. HOLD FOR SURGERY, Disp: , Rfl:     cyclobenzaprine (FLEXERIL) 5 MG tablet, Take 1-2 tablets by mouth 3 (Three) Times a Day As Needed for Muscle Spasms., Disp: 45 tablet, Rfl: 1     dilTIAZem (Cardizem) 30 MG tablet, Take 1 tablet by mouth 3 (Three) Times a Day. (Patient taking differently: Take 1 tablet by mouth As Needed (ESOPHAGEAL SPASMS).), Disp: 90 tablet, Rfl: 11    escitalopram (LEXAPRO) 20 MG tablet, Take 1 tablet by mouth Daily. (Patient taking differently: Take 1 tablet by mouth Every Night.), Disp: 90 tablet, Rfl: 1    famotidine (PEPCID) 40 MG tablet, TAKE 1 TABLET BY MOUTH 2 TIMES A DAY WITH LUNCH AND DINNER (Patient taking differently: Take 1 tablet by mouth As Needed.), Disp: 180 tablet, Rfl: 3    gabapentin (NEURONTIN) 400 MG capsule, Take 1 capsule by mouth 3 (Three) Times a Day. (Patient taking differently: Take 1 capsule by mouth 2 (Two) Times a Day.), Disp: 360 capsule, Rfl: 1    metoprolol tartrate (LOPRESSOR) 25 MG tablet, Take 1 tablet by mouth 2 (Two) Times a Day., Disp: 180 tablet, Rfl: 1    MULTIPLE VITAMIN PO, Take 1 tablet by mouth Daily. HOLD FOR SURGERY, Disp: , Rfl:     oxybutynin XL (DITROPAN-XL) 5 MG 24 hr tablet, Take 1 tablet by mouth Daily. (Patient taking differently: Take 1 tablet by mouth Every Night.), Disp: 90 tablet, Rfl: 1    traZODone (DESYREL) 100 MG tablet, Take 1 tablet by mouth Every Night., Disp: 90 tablet, Rfl: 1    Unithroid 137 MCG tablet, Take 1 tablet by mouth Every Morning. Take on an empty stomach with just water 30 min to an hour before any other medications, food or drink, Disp: 30 tablet, Rfl: 5    vitamin B-12 (CYANOCOBALAMIN) 100 MCG tablet, Take 1 tablet by mouth Daily., Disp: , Rfl:     Vonoprazan Fumarate (Voquezna) 10 MG tablet, Take 1 tablet by mouth Daily., Disp: 10 tablet, Rfl: 0    acetaminophen (TYLENOL) 650 MG 8 hr tablet, Take 1 tablet by mouth Every 8 (Eight) Hours As Needed for Mild Pain. (Patient not taking: Reported on 6/24/2025), Disp: , Rfl:     ondansetron (Zofran) 4 MG tablet, Take 1 tablet by mouth Every 8 (Eight) Hours As Needed for Nausea or Vomiting., Disp: 12 tablet, Rfl: 0    Past Medical History:   Diagnosis  Date    Allergic     Erythromycin    Anxiety and depression     Arthritis     Arthritis of neck     Cervical disc disorder 2024    Cholelithiasis 2017    Colon polyp 2023    CTS (carpal tunnel syndrome)     Deviated septum     Diverticulosis     Elevated cholesterol     Esophageal spasm     Esophageal varices     Spasms    Fibromyalgia     Fibromyalgia, primary     Frozen shoulder     GERD (gastroesophageal reflux disease)     Goiter 1985    Goiter forming /Began levo    Headache 2019    Hearing loss     NO AIDS    Hip arthrosis     Hypertension     Hypothyroidism     IBS (irritable bowel syndrome)     Knee swelling     Lactose intolerance     Nausea     chronic    Overactive bladder     Periarthritis of shoulder     Pneumonia     PONV (postoperative nausea and vomiting)     Pulmonary fibrosis     PVC (premature ventricular contraction)     Rotator cuff syndrome     Seasonal allergies     Shoulder pain     LEFT    Sleep apnea     WEARS CPAP    Stroke     TIA    Thoracic disc disorder     Umbilical hernia        Past Surgical History:   Procedure Laterality Date    BREAST BIOPSY Right early 90's    BUNIONECTOMY Right 09/30/2022    CARPAL TUNNEL RELEASE  11/2021    Dr. Das    CHOLECYSTECTOMY  2017    CHOLECYSTECTOMY WITH INTRAOPERATIVE CHOLANGIOGRAM N/A 06/30/2017    Procedure: CHOLECYSTECTOMY LAPAROSCOPIC INTRAOPERATIVE CHOLANGIOGRAM;  Surgeon: David Kirkland MD;  [place] [facility] [place] [facility]  Service:     COLONOSCOPY      COLONOSCOPY  2013?    DR BRAR    COLONOSCOPY W/ POLYPECTOMY N/A 06/30/2023    Procedure: COLONOSCOPY WITH POLYPECTOMY;  Surgeon: Oleksandr Lorenzo MD;  Location: [facility] OR;  Service: Gastroenterology;  Laterality: N/A;  ascending colon polyp (cold snare)  transverse colon polyp    ENDOSCOPY N/A 12/01/2017    Procedure: ESOPHAGOGASTRODUODENOSCOPY with esophageal dilitation to 20mm,, and tissue biopsies/ polypectomies esophagus and gastric;  Surgeon: Meliza Pope MD;  Location: [facility]  OR;  Service:     ENDOSCOPY N/A 2023    Procedure: ESOPHAGOGASTRODUODENOSCOPY WITH BIOPSY;  Surgeon: Oleksandr Lorenzo MD;  Location:  [facility] OR;  Service: Gastroenterology;  Laterality: N/A;  gastritis-gastric biopsy  esophagitis- distal esophagus biopsy    ESOPHAGOSCOPY  2023    Procedure: ESOPHAGOSCOPY WITH DILATATION;  Surgeon: Oleksandr Lorenzo MD;  Location:  [facility] OR;  Service: Gastroenterology;;  dilate to 18mm    EYE SURGERY Bilateral     LENS REPLACEMENT    FLEXIBLE SIGMOIDOSCOPY      HAND SURGERY      THUMB JOINT    HIP SURGERY Left     BONE SPURS AND CARTILIDGE    HYSTERECTOMY      INGUINAL HERNIA REPAIR Left     as a child    LASER ABLATION N/A     MANDIBLE FRACTURE SURGERY      SHOULDER ARTHROSCOPY W/ ROTATOR CUFF REPAIR Left 2025    Procedure: LEFT SHOULDER ARTHROSCOPY, LABRAL DEBRIDEMENT, DECOMPRESSION,  WITH MINI OPEN  ROTATOR CUFF REPAIR;  Surgeon: Teri Olmos MD;  [facility];  Service: Orthopedics;  Laterality: Left;    SHOULDER SURGERY      SUBTOTAL HYSTERECTOMY      TONSILLECTOMY      TRIGGER POINT INJECTION      TUBAL ABDOMINAL LIGATION      UMBILICAL HERNIA REPAIR N/A 2020    Procedure: INCISIONAL HERNIA REPAIR WITH MESH;  Surgeon: David Kirkland MD;  Location[facility] OR [facility];  Service: General;  Laterality: N/A;    UPPER GASTROINTESTINAL ENDOSCOPY N/A 2011    Normal upper endosopy - Dr. Meliza Pope    WRIST SURGERY      BILATERAL CTR       Social History     Occupational History    Not on file   Tobacco Use    Smoking status: Former     Current packs/day: 0.00     Average packs/day: 0.3 packs/day for 3.9 years (1.0 ttl pk-yrs)     Types: Cigarettes     Start date: 2001     Quit date: 2004     Years since quittin.0     Passive exposure: Past    Smokeless tobacco: Never   Vaping Use    Vaping status: Never Used   Substance and Sexual Activity    Alcohol use: Yes     Alcohol/week: 8.0  "standard drinks of alcohol     Types: 7 Glasses of wine, 1 Cans of beer per week     Comment: weekly    Drug use: Never    Sexual activity: Defer      Social History     Social History Narrative    Not on file       Family History   Problem Relation Age of Onset    Thyroid disease Mother     Hypertension Mother     Arthritis Mother     Hearing loss Mother     Osteoporosis Mother     Stroke Mother     Obesity Mother     Diabetes Father     Hypertension Father     Heart disease Father     Hyperlipidemia Father     Kidney disease Father     Other Father         CHF -     Obesity Father     Sleep apnea Father     Heart disease Brother     Diabetes Brother     Obesity Brother     Diabetes Brother     Obesity Brother     Anxiety disorder Daughter     Depression Daughter     Mental illness Daughter     Mental illness Daughter     Thyroid disease Daughter     Anxiety disorder Daughter     Depression Daughter     Mental illness Daughter     Mental illness Daughter     Thyroid disease Daughter     Thyroid disease Maternal Grandmother     Rheumatologic disease Maternal Grandmother     Osteoporosis Maternal Grandmother     Obesity Maternal Grandmother     Arthritis Maternal Grandmother     Cancer Maternal Grandfather         lung -     Osteoporosis Maternal Grandfather     Obesity Paternal Grandmother     Breast cancer Maternal Aunt     Cancer Maternal Aunt         Breast    Cancer Maternal Aunt         breast - survived    Crohn's disease Maternal Aunt     Breast cancer Paternal Aunt     Cancer Paternal Aunt         breast -     Malig Hyperthermia Neg Hx        Review of Systems:      Constitutional: Denies fever, shaking or chills         All other pertinent positives and negatives as noted above in HPI.    Physical Exam: 59 y.o. female    Vitals:    25 1414   Temp: 97.9 °F (36.6 °C)   TempSrc: Temporal   Weight: 89.2 kg (196 lb 9.6 oz)   Height: 177.8 cm (70\")       General:  Patient is awake " and alert.  Appears in no acute distress or discomfort.      Musculoskeletal/Extremities:    Left hip examined positive tenderness over the greater trochanter gentle range of motion well-tolerated negative straight leg raise and Stinchfield motor and sensory to baseline distally.         Radiology:       Previous left hip films reviewed individually interpreted to evaluate the patient's complaint/s.    Hip imaging shows some mild degenerative changes of left hip joint slight decrease in joint space some early subchondral cyst and bone sclerosis.  Also some previous films done in 2021 were compared to this and show slight increase in degenerative changes as noted above.      Assessment: Left hip greater trochanteric bursitis, hip tendinitis, hip strain, hip arthritis      Plan:      I discussed the findings with the patient.  There is some degenerative changes of the hip joint however I feel the soft tissue about the hip is inflamed and causing most of her symptoms as noted above.  At this time we will plan rest, ice, activity modification, formal physical therapy and steroid injection today.  If symptoms not proved in the next 6 to 8 weeks may return for further evaluation.      We will plan for follow up as above.    All questions were answered.  Patient understands and agrees with the plan.    Large Joint Arthrocentesis  Date/Time: 7/1/2025 3:07 PM  Consent given by: patient  Site marked: site marked  Timeout: Immediately prior to procedure a time out was called to verify the correct patient, procedure, equipment, support staff and site/side marked as required   Supporting Documentation  Indications: pain   Procedure Details  Location: hip -   Preparation: Patient was prepped and draped in the usual sterile fashion  Needle size: 25 G  Approach: anterolateral  Medications administered: 80 mg methylPREDNISolone acetate 80 MG/ML; 2 mL lidocaine PF 1% 1 %  Patient tolerance: patient tolerated the procedure well with  no immediate complications      Tai Acosta MD    07/01/2025    CC to Spenser Hodgson MD     Disclaimer: Please note that areas of this note were completed with computer voice recognition software.  Quite often unanticipated grammatical, syntax, homophones, and other interpretive errors are inadvertently transcribed by the computer software. Please excuse any errors that have escaped final proofreading.

## 2025-07-02 DIAGNOSIS — I49.3 VENTRICULAR PREMATURE BEATS: ICD-10-CM

## 2025-07-02 RX ORDER — METOPROLOL TARTRATE 25 MG/1
25 TABLET, FILM COATED ORAL 2 TIMES DAILY
Qty: 180 TABLET | Refills: 3 | Status: SHIPPED | OUTPATIENT
Start: 2025-07-02

## 2025-07-02 NOTE — TELEPHONE ENCOUNTER
Rx Refill Note  Requested Prescriptions     Pending Prescriptions Disp Refills    metoprolol tartrate (LOPRESSOR) 25 MG tablet [Pharmacy Med Name: Metoprolol Tartrate Oral Tablet 25 MG] 180 tablet 3     Sig: TAKE 1 TABLET TWICE DAILY      Last office visit with prescribing clinician: 1/8/2025   Last telemedicine visit with prescribing clinician: Visit date not found   Next office visit with prescribing clinician: 7/9/2025                         Would you like a call back once the refill request has been completed: [] Yes [] No    If the office needs to give you a call back, can they leave a voicemail: [] Yes [] No    Jomar Magaña MA  07/02/25, 13:58 EDT

## 2025-07-03 ENCOUNTER — HOSPITAL ENCOUNTER (OUTPATIENT)
Dept: ULTRASOUND IMAGING | Facility: HOSPITAL | Age: 60
Discharge: HOME | End: 2025-07-03
Payer: MEDICARE

## 2025-07-03 ENCOUNTER — LAB (OUTPATIENT)
Dept: LAB | Facility: HOSPITAL | Age: 60
End: 2025-07-03
Payer: MEDICARE

## 2025-07-03 DIAGNOSIS — E03.9 HYPOTHYROIDISM, UNSPECIFIED TYPE: ICD-10-CM

## 2025-07-03 PROCEDURE — 80053 COMPREHEN METABOLIC PANEL: CPT | Performed by: FAMILY MEDICINE

## 2025-07-03 PROCEDURE — 84439 ASSAY OF FREE THYROXINE: CPT | Performed by: FAMILY MEDICINE

## 2025-07-03 PROCEDURE — 80061 LIPID PANEL: CPT | Performed by: FAMILY MEDICINE

## 2025-07-03 PROCEDURE — 76536 US EXAM OF HEAD AND NECK: CPT

## 2025-07-03 PROCEDURE — 84443 ASSAY THYROID STIM HORMONE: CPT | Performed by: FAMILY MEDICINE

## 2025-07-03 PROCEDURE — 85027 COMPLETE CBC AUTOMATED: CPT | Performed by: FAMILY MEDICINE

## 2025-07-03 PROCEDURE — 82306 VITAMIN D 25 HYDROXY: CPT | Performed by: FAMILY MEDICINE

## 2025-07-09 ENCOUNTER — OFFICE VISIT (OUTPATIENT)
Dept: INTERNAL MEDICINE | Facility: CLINIC | Age: 60
End: 2025-07-09
Payer: MEDICARE

## 2025-07-09 DIAGNOSIS — F41.8 DEPRESSION WITH ANXIETY: ICD-10-CM

## 2025-07-09 DIAGNOSIS — N32.81 OAB (OVERACTIVE BLADDER): ICD-10-CM

## 2025-07-09 DIAGNOSIS — E03.9 HYPOTHYROIDISM, UNSPECIFIED TYPE: ICD-10-CM

## 2025-07-09 DIAGNOSIS — K21.00 GASTROESOPHAGEAL REFLUX DISEASE WITH ESOPHAGITIS WITHOUT HEMORRHAGE: ICD-10-CM

## 2025-07-09 DIAGNOSIS — E55.9 HYPOVITAMINOSIS D: ICD-10-CM

## 2025-07-09 DIAGNOSIS — Z23 ENCOUNTER FOR ADMINISTRATION OF VACCINE: ICD-10-CM

## 2025-07-09 DIAGNOSIS — B00.1 RECURRENT HERPES LABIALIS: ICD-10-CM

## 2025-07-09 DIAGNOSIS — I49.3 VENTRICULAR PREMATURE BEATS: ICD-10-CM

## 2025-07-09 DIAGNOSIS — N64.59 ABNORMAL BREAST EXAM: ICD-10-CM

## 2025-07-09 DIAGNOSIS — M79.10 MYALGIA: ICD-10-CM

## 2025-07-09 DIAGNOSIS — Z00.00 ROUTINE HEALTH MAINTENANCE: ICD-10-CM

## 2025-07-09 DIAGNOSIS — G47.33 OSA ON CPAP: ICD-10-CM

## 2025-07-09 DIAGNOSIS — Z00.00 MEDICARE ANNUAL WELLNESS VISIT, SUBSEQUENT: Primary | ICD-10-CM

## 2025-07-09 DIAGNOSIS — Z86.0100 HISTORY OF COLON POLYPS: ICD-10-CM

## 2025-07-09 PROCEDURE — 1159F MED LIST DOCD IN RCRD: CPT | Performed by: FAMILY MEDICINE

## 2025-07-09 PROCEDURE — G0439 PPPS, SUBSEQ VISIT: HCPCS | Performed by: FAMILY MEDICINE

## 2025-07-09 PROCEDURE — 96160 PT-FOCUSED HLTH RISK ASSMT: CPT | Performed by: FAMILY MEDICINE

## 2025-07-09 PROCEDURE — 90684 PCV21 VACCINE IM: CPT | Performed by: FAMILY MEDICINE

## 2025-07-09 PROCEDURE — G0009 ADMIN PNEUMOCOCCAL VACCINE: HCPCS | Performed by: FAMILY MEDICINE

## 2025-07-09 PROCEDURE — 99214 OFFICE O/P EST MOD 30 MIN: CPT | Performed by: FAMILY MEDICINE

## 2025-07-09 PROCEDURE — 1170F FXNL STATUS ASSESSED: CPT | Performed by: FAMILY MEDICINE

## 2025-07-09 PROCEDURE — 1125F AMNT PAIN NOTED PAIN PRSNT: CPT | Performed by: FAMILY MEDICINE

## 2025-07-09 PROCEDURE — 1160F RVW MEDS BY RX/DR IN RCRD: CPT | Performed by: FAMILY MEDICINE

## 2025-07-09 RX ORDER — BUPROPION HYDROCHLORIDE 300 MG/1
300 TABLET ORAL EVERY MORNING
Qty: 90 TABLET | Refills: 3 | Status: SHIPPED | OUTPATIENT
Start: 2025-07-09

## 2025-07-09 NOTE — ASSESSMENT & PLAN NOTE
Orders:    Comprehensive Metabolic Panel; Future    Hemoglobin A1c; Future    Vitamin B12; Future    1. Depression with anxiety.  Doing okay with bupropion and escitalopram.     2. OAB.  Controlled with dietary measures and oxybutynin.     3. Recurrent herpes labialis.  No flare.  Continue prn acyclovir.     4. Myalgias.  Continue gabapentin 400 mg TID.      5. Hypothyroidism.  Replaced with levothyroxine 137 mcg daily.  She sees endocrinology.     6. GERD, esophageal dyskinesia.  On BID lansoprazole and BID famotidine, trazodone, Voquenza.  She sees GI and had EGD 6/2023 which showed gastritis; no Kim's.      7. PVCs.  Symptoms improved with metoprolol.  Continue same.     8. History of colon polyps.  6/2023, Dr. Lorenzo.  Recall 7 years.     9. ANNA on CPAP.  She has adjusted to it and feels better with it.  Using and benefiting from CPAP.  Sees Dr. Augustin Wilson.      10. RHM.  Mammogram due and is ordered (diagnostic due to abnormal exam in August).  Pap smear not needed due to hysterectomy.  Tdap UTD.  Pneumococcal 21 today.  Shingrix at pharmacy.

## 2025-07-09 NOTE — PROGRESS NOTES
Subjective   The ABCs of the Annual Wellness Visit  Medicare Wellness Visit      Aundrea Mcgarry is a 59 y.o. patient who presents for a Medicare Wellness Visit.    The following portions of the patient's history were reviewed and   updated as appropriate: allergies, current medications, past family history, past medical history, past social history, past surgical history, and problem list.    Compared to one year ago, the patient's physical   health is the same.  Compared to one year ago, the patient's mental   health is the same.    Recent Hospitalizations:  She was not admitted to the hospital during the last year.     Current Medical Providers:  Patient Care Team:  Spenser Hodgson MD as PCP - General  Spenser Hodgson MD as PCP - Family Medicine  Hollis Chowdhury MD as Consulting Physician (Cardiology)  Oleksandr Lorenzo MD as Consulting Physician (Gastroenterology)    Outpatient Medications Prior to Visit   Medication Sig Dispense Refill    acyclovir (ZOVIRAX) 400 MG tablet Take 1 tablet by mouth 5 (Five) Times a Day. (Patient taking differently: Take 1 tablet by mouth Daily.) 150 tablet 2    aluminum hydroxide-mag carbonate (GAVISCON EXTRA RELIEF) 160-105 MG chewable tablet chewable tablet Chew 1-2 tablets As Needed.      buPROPion XL (WELLBUTRIN XL) 300 MG 24 hr tablet Take 1 tablet by mouth Every Morning. 90 tablet 1    Collagen-Vitamin C-Biotin (Collagen 1500/C) 500-50-0.8 MG capsule Take 2 Capfuls by mouth Daily. HOLD FOR SURGERY      cyclobenzaprine (FLEXERIL) 5 MG tablet Take 1-2 tablets by mouth 3 (Three) Times a Day As Needed for Muscle Spasms. 45 tablet 1    dilTIAZem (Cardizem) 30 MG tablet Take 1 tablet by mouth 3 (Three) Times a Day. (Patient taking differently: Take 1 tablet by mouth As Needed (ESOPHAGEAL SPASMS).) 90 tablet 11    escitalopram (LEXAPRO) 20 MG tablet Take 1 tablet by mouth Daily. (Patient taking differently: Take 1 tablet by mouth Every Night.) 90 tablet 1     famotidine (PEPCID) 40 MG tablet TAKE 1 TABLET BY MOUTH 2 TIMES A DAY WITH LUNCH AND DINNER (Patient taking differently: Take 1 tablet by mouth As Needed.) 180 tablet 3    gabapentin (NEURONTIN) 400 MG capsule Take 1 capsule by mouth 3 (Three) Times a Day. (Patient taking differently: Take 1 capsule by mouth 2 (Two) Times a Day.) 360 capsule 1    metoprolol tartrate (LOPRESSOR) 25 MG tablet TAKE 1 TABLET TWICE DAILY 180 tablet 3    MULTIPLE VITAMIN PO Take 1 tablet by mouth Daily. HOLD FOR SURGERY      oxybutynin XL (DITROPAN-XL) 5 MG 24 hr tablet Take 1 tablet by mouth Daily. (Patient taking differently: Take 1 tablet by mouth Every Night.) 90 tablet 1    traZODone (DESYREL) 100 MG tablet Take 1 tablet by mouth Every Night. 90 tablet 1    Unithroid 137 MCG tablet Take 1 tablet by mouth Every Morning. Take on an empty stomach with just water 30 min to an hour before any other medications, food or drink 30 tablet 5    vitamin B-12 (CYANOCOBALAMIN) 100 MCG tablet Take 1 tablet by mouth Daily.      Vonoprazan Fumarate (Voquezna) 10 MG tablet Take 1 tablet by mouth Daily. 10 tablet 0    acetaminophen (TYLENOL) 650 MG 8 hr tablet Take 1 tablet by mouth Every 8 (Eight) Hours As Needed for Mild Pain. (Patient not taking: Reported on 6/24/2025)      ondansetron (Zofran) 4 MG tablet Take 1 tablet by mouth Every 8 (Eight) Hours As Needed for Nausea or Vomiting. 12 tablet 0     No facility-administered medications prior to visit.     No opioid medication identified on active medication list. I have reviewed chart for other potential  high risk medication/s and harmful drug interactions in the elderly.      Aspirin is not on active medication list.  Aspirin use is not indicated based on review of current medical condition/s. Risk of harm outweighs potential benefits.  .    Patient Active Problem List   Diagnosis    Arthritis    Bunion    Gastroesophageal reflux disease with esophagitis    Esophageal spasm    Ventricular  "premature beats    Recurrent herpes labialis    Hypothyroidism    Chronic left shoulder pain    Bursitis/tendonitis, shoulder    Routine health maintenance    Depression with anxiety    Dysphagia    Osteopenia of both thighs    Hiatal hernia    OAB (overactive bladder)    Pulmonary nodule, right    Umbilical hernia with obstruction, without gangrene    Carpal tunnel syndrome on both sides    Myalgia    History of colon polyps    ANNA on CPAP    Chronic idiopathic constipation    Complete tear of left rotator cuff     Advance Care Planning Advance Directive is not on file.  ACP discussion was held with the patient during this visit. Patient does not have an advance directive, information provided.            Objective   Vitals:    25 0935   BP: 124/68   BP Location: Left arm   Patient Position: Sitting   Cuff Size: Adult   Pulse: 58   Temp: 98 °F (36.7 °C)   TempSrc: Infrared   SpO2: 96%   Weight: 87.5 kg (192 lb 12.8 oz)   Height: 177.8 cm (70\")   PainSc: 6    PainLoc: Hip       Estimated body mass index is 27.66 kg/m² as calculated from the following:    Height as of this encounter: 177.8 cm (70\").    Weight as of this encounter: 87.5 kg (192 lb 12.8 oz).                Does the patient have evidence of cognitive impairment? No  Lab Results   Component Value Date    TRIG 55 2025    HDL 61 (H) 2025     (H) 2025    VLDL 10 2025                                                                                                Health  Risk Assessment    Smoking Status:  Social History     Tobacco Use   Smoking Status Former    Current packs/day: 0.00    Average packs/day: 0.3 packs/day for 3.9 years (1.0 ttl pk-yrs)    Types: Cigarettes    Start date: 2001    Quit date: 2004    Years since quittin.0    Passive exposure: Past   Smokeless Tobacco Never     Alcohol Consumption:  Social History     Substance and Sexual Activity   Alcohol Use Yes    Alcohol/week: 8.0 standard " drinks of alcohol    Types: 7 Glasses of wine, 1 Cans of beer per week    Comment: weekly       Fall Risk Screen  CITLALY Fall Risk Assessment was completed, and patient is at LOW risk for falls.Assessment completed on:2025    Depression Screening   Little interest or pleasure in doing things? Not at all   Feeling down, depressed, or hopeless? Not at all   PHQ-2 Total Score 0      Health Habits and Functional and Cognitive Screenin/8/2025    11:11 PM   Functional & Cognitive Status   Do you have difficulty preparing food and eating? No   Do you have difficulty bathing yourself, getting dressed or grooming yourself? No   Do you have difficulty using the toilet? No   Do you have difficulty moving around from place to place? No   Do you have trouble with steps or getting out of a bed or a chair? Yes   Current Diet Other   Dental Exam Not up to date   Eye Exam Up to date   Exercise (times per week) 2 times per week   Current Exercises Include Light Weights;Other   Do you need help using the phone?  No   Are you deaf or do you have serious difficulty hearing?  Yes   Do you need help to go to places out of walking distance? No   Do you need help shopping? No   Do you need help preparing meals?  No   Do you need help with housework?  Yes   Do you need help with laundry? Yes   Do you need help taking your medications? No   Do you need help managing money? No   Do you ever drive or ride in a car without wearing a seat belt? No   Have you felt unusual fatigue (could be tiredness), stress, anger or loneliness in the last month? Yes   Who do you live with? Spouse   If you need help, do you have trouble finding someone available to you? No   Have you been bothered in the last four weeks by sexual problems? No   Do you have difficulty concentrating, remembering or making decisions? Yes           Age-appropriate Screening Schedule:  Refer to the list below for future screening recommendations based on patient's age,  sex and/or medical conditions. Orders for these recommended tests are listed in the plan section. The patient has been provided with a written plan.    Health Maintenance List  Health Maintenance   Topic Date Due    ZOSTER VACCINE (1 of 2) Never done    Pneumococcal Vaccine 50+ (2 of 2 - PCV) 09/22/2022    COVID-19 Vaccine (4 - 2024-25 season) 09/01/2024    INFLUENZA VACCINE  10/01/2025    ANNUAL WELLNESS VISIT  07/09/2026    MAMMOGRAM  08/02/2026    TDAP/TD VACCINES (3 - Td or Tdap) 09/22/2031    COLORECTAL CANCER SCREENING  06/30/2033    HEPATITIS C SCREENING  Completed                                                                                                                                                CMS Preventative Services Quick Reference  Risk Factors Identified During Encounter  Immunizations Discussed/Encouraged: Capvaxive (Pneumococcal conjugate - PCV21)    The above risks/problems have been discussed with the patient.  Pertinent information has been shared with the patient in the After Visit Summary.  An After Visit Summary and PPPS were made available to the patient.    Follow Up:   Next Medicare Wellness visit to be scheduled in 1 year.         Additional E&M Note during same encounter follows:  Patient has additional, significant, and separately identifiable condition(s)/problem(s) that require work above and beyond the Medicare Wellness Visit     Chief Complaint  Medicare Wellness-subsequent, Hypothyroidism, and Depression    Subjective   Hypothyroidism    Depression    Nighttime awakenings:     PMH Includes: depression      Nicole is also being seen today for additional medical problem/s.    Review of Systems   Constitutional: Negative.    Respiratory: Negative.     Cardiovascular: Negative.    Musculoskeletal:  Positive for myalgias.   Neurological: Negative.    Psychiatric/Behavioral: Negative.                Objective   Vital Signs:  /68 (BP Location: Left arm, Patient Position:  "Sitting, Cuff Size: Adult)   Pulse 58   Temp 98 °F (36.7 °C) (Infrared)   Ht 177.8 cm (70\")   Wt 87.5 kg (192 lb 12.8 oz)   SpO2 96%   BMI 27.66 kg/m²   Physical Exam  Vitals and nursing note reviewed.   Constitutional:       Appearance: Normal appearance. She is well-developed.   HENT:      Head: Normocephalic and atraumatic.      Right Ear: Tympanic membrane and external ear normal.      Left Ear: Tympanic membrane and external ear normal.      Nose: Nose normal.      Mouth/Throat:      Mouth: Mucous membranes are moist.   Eyes:      General: No scleral icterus.        Right eye: No discharge.         Left eye: No discharge.      Extraocular Movements: Extraocular movements intact.      Conjunctiva/sclera: Conjunctivae normal.      Pupils: Pupils are equal, round, and reactive to light.   Neck:      Thyroid: No thyromegaly.   Cardiovascular:      Rate and Rhythm: Normal rate and regular rhythm.      Heart sounds: Normal heart sounds. No murmur heard.     No friction rub. No gallop.   Pulmonary:      Effort: Pulmonary effort is normal. No respiratory distress.      Breath sounds: Normal breath sounds. No wheezing or rales.   Abdominal:      General: Bowel sounds are normal.      Palpations: Abdomen is soft. There is no mass.      Tenderness: There is no abdominal tenderness.      Hernia: No hernia is present.   Musculoskeletal:         General: No deformity.      Cervical back: Normal range of motion and neck supple.      Right lower leg: No edema.      Left lower leg: No edema.   Lymphadenopathy:      Cervical: No cervical adenopathy.   Skin:     General: Skin is warm and dry.      Findings: No rash.   Neurological:      General: No focal deficit present.      Mental Status: She is alert and oriented to person, place, and time.      Cranial Nerves: No cranial nerve deficit.      Motor: No abnormal muscle tone.      Coordination: Coordination normal.      Deep Tendon Reflexes: Reflexes are normal and " symmetric. Reflexes normal.   Psychiatric:         Mood and Affect: Mood normal.         Behavior: Behavior normal.         Thought Content: Thought content normal.         Judgment: Judgment normal.                    Assessment and Plan      Medicare annual wellness visit, subsequent         Depression with anxiety           OAB (overactive bladder)         Recurrent herpes labialis         Myalgia         Hypothyroidism, unspecified type    Orders:    Lipid Panel With / Chol / HDL Ratio; Future    CBC (No Diff); Future    Gastroesophageal reflux disease with esophagitis without hemorrhage         Ventricular premature beats         History of colon polyps         ANNA on CPAP         Routine health maintenance    Orders:    Comprehensive Metabolic Panel; Future    Hemoglobin A1c; Future    Vitamin B12; Future    1. Depression with anxiety.  Doing okay with bupropion and escitalopram.     2. OAB.  Controlled with dietary measures and oxybutynin.     3. Recurrent herpes labialis.  No flare.  Continue prn acyclovir.     4. Myalgias.  Continue gabapentin 400 mg TID.      5. Hypothyroidism.  Replaced with levothyroxine 137 mcg daily.  She sees endocrinology.     6. GERD, esophageal dyskinesia.  On BID lansoprazole and BID famotidine, trazodone, Voquenza.  She sees GI and had EGD 6/2023 which showed gastritis; no Kim's.      7. PVCs.  Symptoms improved with metoprolol.  Continue same.     8. History of colon polyps.  6/2023, Dr. Lorenzo.  Recall 7 years.     9. ANNA on CPAP.  She has adjusted to it and feels better with it.  Using and benefiting from CPAP.  Sees Dr. Augustin Wilson.      10. RHM.  Mammogram due and is ordered (diagnostic due to abnormal exam in August).  Pap smear not needed due to hysterectomy.  Tdap UTD.  Pneumococcal 21 today.  Shingrix at pharmacy.    Encounter for administration of vaccine    Orders:    Pneumococcal Conjugate Vaccine 21 (18+ yrs)    Abnormal breast exam    Orders:    Mammo  Diagnostic Digital Tomosynthesis Bilateral With CAD; Future    US breast bilateral limited; Future    Hypovitaminosis D    Orders:    Vitamin D,25-Hydroxy; Future            Follow Up   Return in about 6 months (around 1/9/2026).  Patient was given instructions and counseling regarding her condition or for health maintenance advice. Please see specific information pulled into the AVS if appropriate.

## 2025-07-12 DIAGNOSIS — N32.81 OAB (OVERACTIVE BLADDER): ICD-10-CM

## 2025-07-12 DIAGNOSIS — F41.8 DEPRESSION WITH ANXIETY: ICD-10-CM

## 2025-07-14 VITALS
OXYGEN SATURATION: 96 % | TEMPERATURE: 98 F | HEART RATE: 58 BPM | HEIGHT: 60 IN | BODY MASS INDEX: 37.85 KG/M2 | DIASTOLIC BLOOD PRESSURE: 68 MMHG | SYSTOLIC BLOOD PRESSURE: 124 MMHG | WEIGHT: 192.8 LBS

## 2025-07-14 RX ORDER — ACYCLOVIR 400 MG/1
TABLET ORAL
Qty: 450 TABLET | Refills: 3 | Status: SHIPPED | OUTPATIENT
Start: 2025-07-14

## 2025-07-14 RX ORDER — OXYBUTYNIN CHLORIDE 5 MG/1
5 TABLET, EXTENDED RELEASE ORAL DAILY
Qty: 90 TABLET | Refills: 3 | Status: SHIPPED | OUTPATIENT
Start: 2025-07-14

## 2025-07-14 RX ORDER — GABAPENTIN 400 MG/1
400 CAPSULE ORAL 3 TIMES DAILY
Qty: 270 CAPSULE | Refills: 1 | Status: SHIPPED | OUTPATIENT
Start: 2025-07-14

## 2025-07-14 NOTE — TELEPHONE ENCOUNTER
Rx Refill Note  Requested Prescriptions     Pending Prescriptions Disp Refills    gabapentin (NEURONTIN) 400 MG capsule [Pharmacy Med Name: Gabapentin Oral Capsule 400 MG] 270 capsule      Sig: TAKE 1 CAPSULE THREE TIMES DAILY     Signed Prescriptions Disp Refills    oxybutynin XL (DITROPAN-XL) 5 MG 24 hr tablet 90 tablet 3     Sig: TAKE 1 TABLET EVERY DAY     Authorizing Provider: JULIETTE BEE     Ordering User: PATRICIA MIKE    acyclovir (ZOVIRAX) 400 MG tablet 450 tablet 3     Sig: TAKE 1 TABLET FIVE TIMES DAILY     Authorizing Provider: JULIETTE BEE     Ordering User: PATRICIA MIKE      Last office visit with prescribing clinician: 7/9/2025   Last telemedicine visit with prescribing clinician: Visit date not found   Next office visit with prescribing clinician: 1/21/2026                         Would you like a call back once the refill request has been completed: [] Yes [] No    If the office needs to give you a call back, can they leave a voicemail: [] Yes [] No    Patricia Mike MA  07/14/25, 08:02 EDT

## 2025-07-15 DIAGNOSIS — K21.00 GASTROESOPHAGEAL REFLUX DISEASE WITH ESOPHAGITIS WITHOUT HEMORRHAGE: ICD-10-CM

## 2025-07-15 RX ORDER — FAMOTIDINE 40 MG/1
40 TABLET, FILM COATED ORAL AS NEEDED
Qty: 90 TABLET | Refills: 1 | Status: SHIPPED | OUTPATIENT
Start: 2025-07-15 | End: 2025-07-16 | Stop reason: SDUPTHER

## 2025-07-15 NOTE — TELEPHONE ENCOUNTER
Patient is requesting Famotidine to be sent to [facility].     LOV: 1/2/2025

## 2025-07-16 DIAGNOSIS — K21.00 GASTROESOPHAGEAL REFLUX DISEASE WITH ESOPHAGITIS WITHOUT HEMORRHAGE: ICD-10-CM

## 2025-07-16 RX ORDER — FAMOTIDINE 40 MG/1
40 TABLET, FILM COATED ORAL DAILY
Qty: 90 TABLET | Refills: 1 | Status: SHIPPED | OUTPATIENT
Start: 2025-07-16

## 2025-08-05 ENCOUNTER — PATIENT MESSAGE (OUTPATIENT)
Dept: ENDOCRINOLOGY | Age: 60
End: 2025-08-05
Payer: MEDICARE

## 2025-08-05 RX ORDER — VONOPRAZAN FUMARATE 13.36 MG/1
10 TABLET ORAL DAILY
Qty: 30 TABLET | Refills: 1 | Status: SHIPPED | OUTPATIENT
Start: 2025-08-05

## 2025-08-06 RX ORDER — LEVOTHYROXINE SODIUM 137 UG/1
137 TABLET ORAL
Qty: 30 TABLET | Refills: 0 | Status: SHIPPED | OUTPATIENT
Start: 2025-08-06

## 2025-08-13 ENCOUNTER — HOSPITAL ENCOUNTER (OUTPATIENT)
Dept: MAMMOGRAPHY | Facility: HOSPITAL | Age: 60
Discharge: HOME | End: 2025-08-13
Payer: MEDICARE

## 2025-08-13 ENCOUNTER — HOSPITAL ENCOUNTER (OUTPATIENT)
Dept: ULTRASOUND IMAGING | Facility: HOSPITAL | Age: 60
Discharge: HOME | End: 2025-08-13
Payer: MEDICARE

## 2025-08-13 DIAGNOSIS — N64.59 ABNORMAL BREAST EXAM: ICD-10-CM

## 2025-08-13 PROCEDURE — G0279 TOMOSYNTHESIS, MAMMO: HCPCS

## 2025-08-13 PROCEDURE — 77066 DX MAMMO INCL CAD BI: CPT

## 2025-08-13 PROCEDURE — 76642 ULTRASOUND BREAST LIMITED: CPT

## 2025-08-14 ENCOUNTER — OFFICE VISIT (OUTPATIENT)
Dept: ORTHOPEDIC SURGERY | Facility: CLINIC | Age: 60
End: 2025-08-14
Payer: MEDICARE

## 2025-08-14 ENCOUNTER — TELEPHONE (OUTPATIENT)
Dept: ORTHOPEDIC SURGERY | Facility: CLINIC | Age: 60
End: 2025-08-14

## 2025-08-14 VITALS — TEMPERATURE: 97.8 F | WEIGHT: 198.4 LBS | BODY MASS INDEX: 38.95 KG/M2 | HEIGHT: 60 IN

## 2025-08-14 DIAGNOSIS — Z98.890 S/P ROTATOR CUFF REPAIR: Primary | ICD-10-CM

## (undated) DEVICE — TRAP FLD MINIVAC MEGADYNE 100ML

## (undated) DEVICE — PK ARTHSCP SHLDR TOWER 40

## (undated) DEVICE — GOWN ISOL W/THUMB UNIV BLU BX/15

## (undated) DEVICE — SUCTION CANISTER, 3000CC,SAFELINER: Brand: DEROYAL

## (undated) DEVICE — ENDOPOUCH RETRIEVER SPECIMEN RETRIEVAL BAGS: Brand: ENDOPOUCH RETRIEVER

## (undated) DEVICE — BW-412T DISP COMBO CLEANING BRUSH: Brand: SINGLE USE COMBINATION CLEANING BRUSH

## (undated) DEVICE — FRCP BX RADJAW4 NDL 2.8 240CM LG OG BX40

## (undated) DEVICE — ENDOPATH XCEL BLADELESS TROCARS WITH STABILITY SLEEVES: Brand: ENDOPATH XCEL

## (undated) DEVICE — PK PROC MINOR TOWER 40

## (undated) DEVICE — MASK,FACE,SHIELD,BLUE,ANTI FOG,TIES: Brand: MEDLINE

## (undated) DEVICE — UNDYED BRAIDED (POLYGLACTIN 910), SYNTHETIC ABSORBABLE SUTURE: Brand: COATED VICRYL

## (undated) DEVICE — TBG ARTHSCP PT W CONN/REDUC 8FT

## (undated) DEVICE — SKIN PREP TRAY W/CHG: Brand: MEDLINE INDUSTRIES, INC.

## (undated) DEVICE — PK LAP CHOLE BG

## (undated) DEVICE — Device: Brand: DEFENDO AIR/WATER/SUCTION AND BIOPSY VALVE

## (undated) DEVICE — SUT VIC 5/0 PS2 18IN J495H

## (undated) DEVICE — GLV SURG SENSICARE MICRO PF LF 6 STRL

## (undated) DEVICE — ESOPHAGEAL BALLOON DILATATION CATHETER: Brand: CRE FIXED WIRE

## (undated) DEVICE — ANTIBACTERIAL UNDYED BRAIDED (POLYGLACTIN 910), SYNTHETIC ABSORBABLE SURGICAL SUTURE: Brand: COATED VICRYL

## (undated) DEVICE — DRSNG WND GZ CURAD OIL EMULSION 3X3IN STRL

## (undated) DEVICE — SUT ETHIB 0/0 MO6 I8IN CX45D

## (undated) DEVICE — PENCL SMOKE/EVAC MEGADYNE TELESCP 10FT

## (undated) DEVICE — STRIP,CLOSURE,WOUND,MEDI-STRIP,1/2X4: Brand: MEDLINE

## (undated) DEVICE — SUT VIC 0 TN 27IN DYED JTN0G

## (undated) DEVICE — SYR LUER SLPTP 50ML

## (undated) DEVICE — ADHS SKIN DERMABOND TOP ADVANCED

## (undated) DEVICE — VIAL FORMALIN CAP 10P 40ML

## (undated) DEVICE — IRRIGATOR BULB ASEPTO 60CC STRL

## (undated) DEVICE — JACKT LAB KNIT COLR LG BLU

## (undated) DEVICE — BLANKT WARM LOWR/BDY 100X120CM

## (undated) DEVICE — DEV INFL ALLIANCE2 SYS

## (undated) DEVICE — INTENDED FOR TISSUE SEPARATION, AND OTHER PROCEDURES THAT REQUIRE A SHARP SURGICAL BLADE TO PUNCTURE OR CUT.: Brand: BARD-PARKER ® CARBON RIB-BACK BLADES

## (undated) DEVICE — SPNG GZ WOVN 4X4IN 12PLY 10/BX STRL

## (undated) DEVICE — GLV SURG PREMIERPRO ORTHO LTX PF SZ7.5 BRN

## (undated) DEVICE — SUT VIC 3/0 TIES 18IN J110T

## (undated) DEVICE — SYR LUERLOK 20CC

## (undated) DEVICE — GOWN,NON-REINFORCED,SIRUS,SET IN SLV,XXL: Brand: MEDLINE

## (undated) DEVICE — INTENDED TO SUPPORT AND MAINTAIN THE POSITION OF AN ANESTHETIZED PATIENT DURING SURGERY: Brand: BERKETTE BEACH CHAIR LIMB POSITIONER

## (undated) DEVICE — Device

## (undated) DEVICE — GLV SURG BIOGEL LTX PF 6 1/2

## (undated) DEVICE — YANKAUER,POOLE TIP,STERILE,50/CS: Brand: MEDLINE

## (undated) DEVICE — ABL ASP APOLLORF I90 90DEG

## (undated) DEVICE — SUT VIC 2/0 CT2 27IN J269H

## (undated) DEVICE — SUT VIC 3/0 SH 27IN J416H

## (undated) DEVICE — PATIENT RETURN ELECTRODE, SINGLE-USE, CONTACT QUALITY MONITORING, ADULT, WITH 9FT CORD, FOR PATIENTS WEIGING OVER 33LBS. (15KG): Brand: MEGADYNE

## (undated) DEVICE — ENDOPATH PNEUMONEEDLE INSUFFLATION NEEDLES WITH LUER LOCK CONNECTORS 120MM: Brand: ENDOPATH

## (undated) DEVICE — ELECTRD BLD EDGE/INSUL1P 2.4X5.1MM STRL

## (undated) DEVICE — ENCORE® LATEX ORTHO SIZE 7.5, STERILE LATEX POWDER-FREE SURGICAL GLOVE: Brand: ENCORE

## (undated) DEVICE — SYR LL 3CC

## (undated) DEVICE — BLD SHAVER BONECUTTER 5MM 13CM

## (undated) DEVICE — THE BITE BLOCK MAXI, LATEX FREE STRAP IS USED TO PROTECT THE ENDOSCOPE INSERTION TUBE FROM BEING BITTEN BY THE PATIENT.

## (undated) DEVICE — INTENT TO BE USED WITH SUTURE MATERIAL FOR TISSUE CLOSURE: Brand: RICHARD-ALLAN® NEEDLE 1/2 CIRCLE TAPER